# Patient Record
Sex: MALE | Race: WHITE | NOT HISPANIC OR LATINO | Employment: FULL TIME | URBAN - METROPOLITAN AREA
[De-identification: names, ages, dates, MRNs, and addresses within clinical notes are randomized per-mention and may not be internally consistent; named-entity substitution may affect disease eponyms.]

---

## 2017-01-04 ENCOUNTER — TRANSFERRED RECORDS (OUTPATIENT)
Dept: HEALTH INFORMATION MANAGEMENT | Facility: CLINIC | Age: 62
End: 2017-01-04

## 2017-03-01 ENCOUNTER — TRANSFERRED RECORDS (OUTPATIENT)
Dept: HEALTH INFORMATION MANAGEMENT | Facility: CLINIC | Age: 62
End: 2017-03-01

## 2017-03-09 ENCOUNTER — TELEPHONE (OUTPATIENT)
Dept: FAMILY MEDICINE | Facility: CLINIC | Age: 62
End: 2017-03-09

## 2017-03-09 NOTE — TELEPHONE ENCOUNTER
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 States Sunday he developed fever, cough, sore throat. Advised given timeframe tamiflu would not be an option. Discussed pushing fluids, rest and otc pain relievers prn. Advised to call back if not improving as expected. Patient/ parent verbalized understanding and agrees with plan.    Bella Pratt RN   [facility] - Triage

## 2017-03-15 ENCOUNTER — OFFICE VISIT (OUTPATIENT)
Dept: FAMILY MEDICINE | Facility: CLINIC | Age: 62
End: 2017-03-15
Payer: COMMERCIAL

## 2017-03-15 VITALS
DIASTOLIC BLOOD PRESSURE: 92 MMHG | WEIGHT: 221 LBS | TEMPERATURE: 97.9 F | BODY MASS INDEX: 29.29 KG/M2 | HEIGHT: 73 IN | SYSTOLIC BLOOD PRESSURE: 146 MMHG | OXYGEN SATURATION: 98 % | HEART RATE: 97 BPM

## 2017-03-15 DIAGNOSIS — R05.9 COUGH: Primary | ICD-10-CM

## 2017-03-15 DIAGNOSIS — J20.9 ACUTE BRONCHITIS WITH SYMPTOMS > 10 DAYS: ICD-10-CM

## 2017-03-15 LAB
FLUAV+FLUBV AG SPEC QL: NEGATIVE
FLUAV+FLUBV AG SPEC QL: NORMAL
SPECIMEN SOURCE: NORMAL

## 2017-03-15 PROCEDURE — 99213 OFFICE O/P EST LOW 20 MIN: CPT | Performed by: FAMILY MEDICINE

## 2017-03-15 PROCEDURE — 87804 INFLUENZA ASSAY W/OPTIC: CPT | Performed by: FAMILY MEDICINE

## 2017-03-15 RX ORDER — AZITHROMYCIN 250 MG/1
TABLET, FILM COATED ORAL
Qty: 6 TABLET | Refills: 0 | Status: SHIPPED | OUTPATIENT
Start: 2017-03-15 | End: 2017-08-07

## 2017-03-15 RX ORDER — FOLIC ACID 0.8 MG
500 TABLET ORAL EVERY MORNING
COMMUNITY
End: 2021-06-22

## 2017-03-15 RX ORDER — ALBUTEROL SULFATE 90 UG/1
2 AEROSOL, METERED RESPIRATORY (INHALATION) EVERY 6 HOURS PRN
Qty: 1 INHALER | Refills: 0 | Status: SHIPPED | OUTPATIENT
Start: 2017-03-15 | End: 2018-01-22

## 2017-03-15 NOTE — MR AVS SNAPSHOT
"              After Visit Summary   3/15/2017    Jeffrey Conteh    MRN: [id number]           Patient Information     Date Of Birth          1955        Visit Information        Provider Department      3/15/2017 12:00 PM Doyle Silva MD [facility] [place]        Today's Diagnoses     Cough    -  1    Acute bronchitis with symptoms > 10 days           Follow-ups after your visit        Who to contact     If you have questions or need follow up information about today's clinic visit or your schedule please contact [facility] [place] directly at [phone].  Normal or non-critical lab and imaging results will be communicated to you by MicroCHIPShart, letter or phone within 4 business days after the clinic has received the results. If you do not hear from us within 7 days, please contact the clinic through MicroCHIPShart or phone. If you have a critical or abnormal lab result, we will notify you by phone as soon as possible.  Submit refill requests through dev9k or call your pharmacy and they will forward the refill request to us. Please allow 3 business days for your refill to be completed.          Additional Information About Your Visit        MyChart Information     [email or web address] lets you send messages to your doctor, view your test results, renew your prescriptions, schedule appointments and more. To sign up, go to [email or web address] . Click on \"Log in\" on the left side of the screen, which will take you to the Welcome page. Then click on \"Sign up Now\" on the right side of the page.     You will be asked to enter the access code listed below, as well as some personal information. Please follow the directions to create your username and password.     Your access code is: [email or web address]  Expires: 2017 12:29 PM     Your access code will  in 90 days. If you need help or a new code, please call your [facility] or [phone].        Care EveryWhere ID     This is your Care EveryWhere ID. " "This could be used by other organizations to access your [place] medical records  [id number]        Your Vitals Were     Pulse Temperature Height Pulse Oximetry BMI (Body Mass Index)       97 97.9  F (36.6  C) (Tympanic) 6' 1\" (1.854 m) 98% 29.16 kg/m2        Blood Pressure from Last 3 Encounters:   03/15/17 (!) 146/92   06/14/16 138/74   09/25/15 120/72    Weight from Last 3 Encounters:   03/15/17 221 lb (100.2 kg)   06/14/16 221 lb (100.2 kg)   09/25/15 223 lb (101.2 kg)              We Performed the Following     Influenza A/B antigen          Today's Medication Changes          These changes are accurate as of: 3/15/17 12:29 PM.  If you have any questions, ask your nurse or doctor.               Start taking these medicines.        Dose/Directions    albuterol 108 (90 BASE) MCG/ACT Inhaler   Commonly known as:  PROAIR HFA/PROVENTIL HFA/VENTOLIN HFA   Used for:  Cough, Acute bronchitis with symptoms > 10 days   Started by:  Doyle Silva MD        Dose:  2 puff   Inhale 2 puffs into the lungs every 6 hours as needed for shortness of breath / dyspnea or wheezing   Quantity:  1 Inhaler   Refills:  0       azithromycin 250 MG tablet   Commonly known as:  ZITHROMAX   Used for:  Cough, Acute bronchitis with symptoms > 10 days   Started by:  Doyle Silva MD        Two tablets first day, then one tablet daily for four days.   Quantity:  6 tablet   Refills:  0         Stop taking these medicines if you haven't already. Please contact your care team if you have questions.     metroNIDAZOLE 1 % gel   Commonly known as:  METROGEL   Stopped by:  Doyle Silva MD                Where to get your medicines      These medications were sent to [id number] - [place], [place] - [address] AT [facility] [place] [facility] [address]  [address], [place] [address]    Hours:  24-hours Phone:  [phone]     albuterol 108 (90 BASE) MCG/ACT Inhaler    azithromycin 250 MG tablet                Primary Care " Provider Office Phone # Fax #    Doyle Silva [phone]       [facility] [place] [address] [place] [address] DR  [place] [address]        Thank you!     [facility] you [facility]  for your care. Our goal is always to provide you with excellent care. Hearing back from our patients is one way we can continue to improve our services. Please take a few minutes to complete the written survey that you may receive in the mail after your visit with us. Thank you!             Your Updated Medication List - Protect others around you: Learn how to safely use, store and throw away your medicines at www.disposemymeds.org.          This list is accurate as of: 3/15/17 12:29 PM.  Always use your most recent med list.                   Brand Name Dispense Instructions for use    albuterol 108 (90 BASE) MCG/ACT Inhaler    PROAIR HFA/PROVENTIL HFA/VENTOLIN HFA    1 Inhaler    Inhale 2 puffs into the lungs every 6 hours as needed for shortness of breath / dyspnea or wheezing       aspirin 81 MG tablet      Take 81 mg by mouth daily       azithromycin 250 MG tablet    ZITHROMAX    6 tablet    Two tablets first day, then one tablet daily for four days.       Magnesium 500 MG Caps          TUMS ULTRA 1000 1000 MG Chew   Generic drug:  calcium carbonate antacid      Take 1 chew tab by mouth 2 times daily       vitamin D 1000 UNITS capsule      Take 1 capsule by mouth daily

## 2017-03-15 NOTE — PROGRESS NOTES
SUBJECTIVE:                                                    Jeffrey Conteh is a 61 year old male who presents to clinic today for the following health issues:      Acute Illness   Acute illness concerns: URI  Onset: 12 days    Fever: no - low grade in the beginning    Chills/Sweats: no    Headache (location?): no    Sinus Pressure:no    Conjunctivitis:  Eyes feeling dry and red    Ear Pain: no    Rhinorrhea: YES    Congestion: YES - mostly in his chest    Sore Throat: YES- from coughing, tickling and drainage     Cough: YES-non-productive    Wheeze: YES    Decreased Appetite: no    Nausea: no    Vomiting: no    Diarrhea:  no    Dysuria/Freq.: no    Fatigue/Achiness: no    Sick/Strep Exposure: YES- Patient works at group home, 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      Therapies Tried and outcome: Allegra D, Mucinex, Aleve          Problem list and histories reviewed & adjusted, as indicated.  Additional history: as documented    Patient Active Problem List   Diagnosis     Sleep apnea     Diverticulitis of colon     Hyperlipidemia LDL goal <130     Neck arthritis (H)     Rosacea     Past Surgical History   Procedure Laterality Date     Appendectomy       2006 along with diverticulities      C total hip arthroplasty       2005     Septoplasty         Social History   Substance Use Topics     Smoking status: Never Smoker     Smokeless tobacco: Never Used     Alcohol use 0.0 oz/week     0 Standard drinks or equivalent per week     Family History   Problem Relation Age of Onset     Hypertension Father      Parkinsonism Father      Dementia Father      CANCER Father      Skin     Ovarian Cancer Mother      CANCER Maternal Grandmother      brain tumor      CANCER Maternal Uncle      brain tumor            Reviewed and updated as needed this visit by clinical staff       Reviewed and updated as needed this visit by Provider         ROS:  C: NEGATIVE for fever, chills, change in weight  E/M: NEGATIVE for ear,  "mouth and throat problems  RESP:POSITIVE for cough-non productive  CV: NEGATIVE for chest pain, palpitations or peripheral edema    OBJECTIVE:                                                    BP (!) 146/92 (BP Location: Right arm, Patient Position: Chair, Cuff Size: Adult Large)  Pulse 97  Temp 97.9  F (36.6  C) (Tympanic)  Ht 6' 1\" (1.854 m)  Wt 221 lb (100.2 kg)  SpO2 98%  BMI 29.16 kg/m2  Body mass index is 29.16 kg/(m^2).   GENERAL: healthy, alert, well nourished, well hydrated, no distress  HENT: ear canals- normal; TMs- normal; Nose- normal; Mouth- no ulcers, no lesions  NECK: no tenderness, no adenopathy, no asymmetry, no masses, no stiffness; thyroid- normal to palpation  RESP: lungs clear to auscultation - no rales, no rhonchi, mild wheezing noted.  CV: regular rates and rhythm, normal S1 S2, no S3 or S4 and no murmur, no click or rub -       ASSESSMENT/PLAN:                                                        ICD-10-CM    1. Cough R05 Influenza A/B antigen     azithromycin (ZITHROMAX) 250 MG tablet     albuterol (PROAIR HFA/PROVENTIL HFA/VENTOLIN HFA) 108 (90 BASE) MCG/ACT Inhaler   2. Acute bronchitis with symptoms > 10 days J20.9 azithromycin (ZITHROMAX) 250 MG tablet     albuterol (PROAIR HFA/PROVENTIL HFA/VENTOLIN HFA) 108 (90 BASE) MCG/ACT Inhaler       Patient requested to be checked for flu, will order and follow up on that.  He has symptoms for almost 2 weeks now mild wheezing with cough, will treat it as bronchitis.  Azithromycin along with the albuterol inhaler.    Doyle Silva MD  [facility]    "

## 2017-03-15 NOTE — NURSING NOTE
"Chief Complaint   Patient presents with     URI       Initial BP (!) 146/92 (BP Location: Right arm, Patient Position: Chair, Cuff Size: Adult Large)  Pulse 97  Temp 97.9  F (36.6  C) (Tympanic)  Ht 6' 1\" (1.854 m)  Wt 221 lb (100.2 kg)  SpO2 98%  BMI 29.16 kg/m2 Estimated body mass index is 29.16 kg/(m^2) as calculated from the following:    Height as of this encounter: 6' 1\" (1.854 m).    Weight as of this encounter: 221 lb (100.2 kg).  Medication Reconciliation: complete Guera Baker MA      "

## 2017-05-05 ENCOUNTER — THERAPY VISIT (OUTPATIENT)
Dept: PHYSICAL THERAPY | Facility: CLINIC | Age: 62
End: 2017-05-05
Payer: COMMERCIAL

## 2017-05-05 DIAGNOSIS — M76.61 RIGHT ACHILLES TENDINITIS: Primary | ICD-10-CM

## 2017-05-05 PROCEDURE — 97110 THERAPEUTIC EXERCISES: CPT | Mod: GP | Performed by: PHYSICAL THERAPIST

## 2017-05-05 PROCEDURE — 97161 PT EVAL LOW COMPLEX 20 MIN: CPT | Mod: GP | Performed by: PHYSICAL THERAPIST

## 2017-05-05 NOTE — LETTER
[facility] ATHLETIC [facility] PHYSICALTHERAPY  [address]  [address]  [facility] [address]  [phone]    May 5, 2017    Re: Jeffrey Conteh   :   1955  MRN:  [id number]   REFERRING PHYSICIAN:   Diogo Watson    [facility]    Date of Initial Evaluation:  17  Visits:  Rxs Used: 1  Reason for Referral:  Right Achilles tendinitis    EVALUATION SUMMARY    Subjective:  Patient is a 61 year old male presenting with rehab right ankle/foot hpi. The history is provided by the patient. No  was used. Jeffrey Conteh is a 61 year old male with a right ankle condition. Condition occurred with:  Insidious onset.  Condition occurred: for unknown reasons. This is a new condition  2016.    Patient reports pain:  Posterior and lower leg (Right Achillies).  Radiates to:  Lower leg.  Pain is described as aching and is intermittent (depends on activitiy level) and reported as 2/10 (7-9/10 with activity). Pain is the same all the time.  Symptoms are exacerbated by walking and running and relieved by rest, ice and other (..25 inch lift).  Since onset symptoms are unchanged.  Special tests:  X-ray. General health as reported by patient is good.  Pertinent medical history includes: Osteoarthritis, history of fractures and sleep disorder/apnea. Medical allergies: no. Other surgeries include:  Orthopedic surgery.  Current medications:  None as reported by patient.  Current occupation is . Primary job tasks include:  Prolonged standing.    Barriers include:  None as reported by patient.  Red flags:  None as reported by patient.    Objective:  System  Ankle/Foot Evaluation  ROM:    AROM:    Dorsiflexion: Left:    Right:   Wnl, stretch   Plantarflexion:  Left:  Wnl    Right:  Wnl  PROM:    Dorsiflexion: Left:        Right:   Wnl, stretch   Plantarflexion: Left:        Right:   Wnl  Strength:    Dorsiflexion:  Left: 4+/5   Strong/pain free    Pain:   Right: 4+/5   Strong/pain free  Pain:  Plantarflexion: Left: 5/5   Pain:   Right: 4+/5  Strong/painful  Pain:  Re: Jeffrey Conteh   :   1955    LIGAMENT TESTING: not assessed  SPECIAL TESTS: not assessed  PALPATION:   Right ankle tenderness present at:   achilles tendon  EDEMA: normal  General   ROS    Assessment/Plan:    Patient is a 61 year old male with right side ankle complaints.    Patient has the following significant findings with corresponding treatment plan.                Diagnosis 1:  Right Achilles tendinosis non-insertional  Pain -  hot/cold therapy, US and manual therapy  Decreased ROM/flexibility - manual therapy and therapeutic exercise  Decreased strength - therapeutic exercise and therapeutic activities    Therapy Evaluation Codes:   1) History comprised of:   Personal factors that impact the plan of care:      Age and None.    Comorbidity factors that impact the plan of care are:      None.     Medications impacting care: None.  2) Examination of Body Systems comprised of:   Body structures and functions that impact the plan of care:      Ankle.   Activity limitations that impact the plan of care are:      Walking.  3) Clinical presentation characteristics are:   Stable/Uncomplicated.  4) Decision-Making    Low complexity using standardized patient assessment instrument and/or measureable assessment of functional outcome.  Cumulative Therapy Evaluation is: Low complexity.    Communication ability:  Patient appears to be able to clearly communicate and understand verbal and written communication and follow directions correctly.  Treatment Explanation - The following has been discussed with the patient:   RX ordered/plan of care  Anticipated outcomes  Possible risks and side effects              Re: Jeffrey CHAKRABORTY Conteh   :   1955    This patient would benefit from PT intervention to resume normal activities.    Rehab potential is excellent.  Frequency:  1 X week, once daily  Duration:  for 12 weeks  Discharge Plan:  Achieve all LTG.  Independent in home treatment program.  Reach maximal therapeutic benefit.    Thank you for your referral.    INQUIRIES  Therapist: Joyce Wu PT   [facility] [place] [facility]  [address] [place] [address]  [address]  [place] [address]  Phone: [phone]  Fax: [phone]

## 2017-05-05 NOTE — PROGRESS NOTES
Subjective:    Patient is a 61 year old male presenting with rehab right ankle/foot hpi. The history is provided by the patient. No  was used.   Jeffrey Conteh is a 61 year old male with a right ankle condition.  Condition occurred with:  Insidious onset.  Condition occurred: for unknown reasons.  This is a new condition  November 2016.    Patient reports pain:  Posterior and lower leg (Right Achillies).  Radiates to:  Lower leg.  Pain is described as aching and is intermittent (depends on activitiy level) and reported as 2/10 (7-9/10 with activity).   Pain is the same all the time.  Symptoms are exacerbated by walking and running and relieved by rest, ice and other (..25 inch lift).  Since onset symptoms are unchanged.  Special tests:  X-ray.      General health as reported by patient is good.  Pertinent medical history includes:  Osteoarthritis, history of fractures and sleep disorder/apnea.  Medical allergies: no.  Other surgeries include:  Orthopedic surgery.  Current medications:  None as reported by patient.  Current occupation is .    Primary job tasks include:  Prolonged standing.    Barriers include:  None as reported by patient.    Red flags:  None as reported by patient.                        Objective:    System    Ankle/Foot Evaluation  ROM:    AROM:    Dorsiflexion: Left:    Right:   Wnl, stretch   Plantarflexion:  Left:  Wnl    Right:  Wnl          PROM:    Dorsiflexion: Left:        Right:   Wnl, stretch   Plantarflexion: Left:        Right:  Wnl              Strength:    Dorsiflexion:  Left: 4+/5   Strong/pain free    Pain:   Right: 4+/5   Strong/pain free  Pain:  Plantarflexion: Left: 5/5   Pain:   Right: 4+/5  Strong/painful  Pain:                      LIGAMENT TESTING: not assessed              SPECIAL TESTS: not assessed    PALPATION:     Right ankle tenderness present at:   achilles tendon  EDEMA: normal                                                               General     ROS    Assessment/Plan:      Patient is a 61 year old male with right side ankle complaints.    Patient has the following significant findings with corresponding treatment plan.                Diagnosis 1:  Right Achilles tendinosis non-insertional  Pain -  hot/cold therapy, US and manual therapy  Decreased ROM/flexibility - manual therapy and therapeutic exercise  Decreased strength - therapeutic exercise and therapeutic activities    Therapy Evaluation Codes:   1) History comprised of:   Personal factors that impact the plan of care:      Age and None.    Comorbidity factors that impact the plan of care are:      None.     Medications impacting care: None.  2) Examination of Body Systems comprised of:   Body structures and functions that impact the plan of care:      Ankle.   Activity limitations that impact the plan of care are:      Walking.  3) Clinical presentation characteristics are:   Stable/Uncomplicated.  4) Decision-Making    Low complexity using standardized patient assessment instrument and/or measureable assessment of functional outcome.  Cumulative Therapy Evaluation is: Low complexity.    Previous and current functional limitations:  (See Goal Flow Sheet for this information)    Short term and Long term goals: (See Goal Flow Sheet for this information)     Communication ability:  Patient appears to be able to clearly communicate and understand verbal and written communication and follow directions correctly.  Treatment Explanation - The following has been discussed with the patient:   RX ordered/plan of care  Anticipated outcomes  Possible risks and side effects  This patient would benefit from PT intervention to resume normal activities.   Rehab potential is excellent.    Frequency:  1 X week, once daily  Duration:  for 12 weeks  Discharge Plan:  Achieve all LTG.  Independent in home treatment program.  Reach maximal therapeutic benefit.    Please refer to the daily  flowsheet for treatment today, total treatment time and time spent performing 1:1 timed codes.

## 2017-05-05 NOTE — MR AVS SNAPSHOT
After Visit Summary   5/5/2017    Jeffrey Conteh    MRN: [id number]           Patient Information     Date Of Birth          1955        Visit Information        Provider Department      5/5/2017 10:10 AM Joyce Wu, PT [facility] [place] [facility]        Today's Diagnoses     Right Achilles tendinitis    -  1       Follow-ups after your visit        Your next 10 appointments already scheduled     May 12, 2017  2:00 PM CDT   ALEJANDRO Extremity with Ruben Elam, PT   [facility] [place] [facility] ([facility] [place])    [address] [place] [address]  [address]  [place] [address]   [phone]            May 19, 2017  2:00 PM CDT   ALEJANDRO Extremity with [facility]en Elam [facility] [place] [facility] ([facility] [place])    [address] [place] [address]  [address]  [place] [address]   [phone]            May 30, 2017  9:40 AM CDT   ALEJANDRO Extremity with Ruben Elam [facility] [place] [facility] ([facility] [place])    [address] [place] [address]  [address]  [place] [address]   [phone]              Who to contact     If you have questions or need follow up information about today's clinic visit or your schedule please contact [facility] directly at [phone].  Normal or non-critical lab and imaging results will be communicated to you by Stealth10hart, letter or phone within 4 business days after the clinic has received the results. If you do not hear from us within 7 days, please contact the clinic through Stealth10hart or phone. If you have a critical or abnormal lab result, we will notify you by phone as soon as possible.  Submit refill requests through yetu or call your pharmacy and they will forward the refill request to us. Please allow 3 business days for your refill to be completed.          Additional Information About  "Your Visit        MyChart Information     Cloak lets you send messages to your doctor, view your test results, renew your prescriptions, schedule appointments and more. To sign up, go to [email or web address] . Click on \"Log in\" on the left side of the screen, which will take you to the Welcome page. Then click on \"Sign up Now\" on the right side of the page.     You will be asked to enter the access code listed below, as well as some personal information. Please follow the directions to create your username and password.     Your access code is: [email or web address]  Expires: 2017 12:29 PM     Your access code will  in 90 days. If you need help or a new code, please call your [place] clinic or [phone].        Care EveryWhere ID     This is your Care EveryWhere ID. This could be used by other organizations to access your [place] medical records  [id number]         Blood Pressure from Last 3 Encounters:   03/15/17 (!) 146/92   16 138/74   09/25/15 120/72    Weight from Last 3 Encounters:   03/15/17 100.2 kg (221 lb)   16 100.2 kg (221 lb)   09/25/15 101.2 kg (223 lb)              We Performed the Following     PT Eval, Low Complexity (99865)     Therapeutic Exercises        Primary Care Provider Office Phone # Fax #    Doyle Silva [phone]       [facility] [place] [address] [place] [address] DR  [place] [address]        Thank you!     Thank you for choosing [facility]  [facility] your [facility]. Our goal is always to provide you with excellent care. Hearing back from our patients is one way we can continue to improve our services. Please take a few minutes to complete the written survey that you may receive in the mail after your visit with us. Thank you!             Your Updated Medication List - Protect others around you: Learn how to safely use, store and throw away your medicines at www.disposemymeds.org.          This " list is accurate as of: 5/5/17  3:38 PM.  Always use your most recent med list.                   Brand Name Dispense Instructions for use    albuterol 108 (90 BASE) MCG/ACT Inhaler    PROAIR HFA/PROVENTIL HFA/VENTOLIN HFA    1 Inhaler    Inhale 2 puffs into the lungs every 6 hours as needed for shortness of breath / dyspnea or wheezing       aspirin 81 MG tablet      Take 81 mg by mouth daily       azithromycin 250 MG tablet    ZITHROMAX    6 tablet    Two tablets first day, then one tablet daily for four days.       Magnesium 500 MG Caps          TUMS ULTRA 1000 1000 MG Chew   Generic drug:  calcium carbonate antacid      Take 1 chew tab by mouth 2 times daily       vitamin D 1000 UNITS capsule      Take 1 capsule by mouth daily

## 2017-05-12 ENCOUNTER — THERAPY VISIT (OUTPATIENT)
Dept: PHYSICAL THERAPY | Facility: CLINIC | Age: 62
End: 2017-05-12
Payer: COMMERCIAL

## 2017-05-12 DIAGNOSIS — M76.61 RIGHT ACHILLES TENDINITIS: ICD-10-CM

## 2017-05-12 PROCEDURE — 97140 MANUAL THERAPY 1/> REGIONS: CPT | Mod: GP | Performed by: PHYSICAL THERAPIST

## 2017-05-12 PROCEDURE — 97110 THERAPEUTIC EXERCISES: CPT | Mod: GP | Performed by: PHYSICAL THERAPIST

## 2017-05-12 PROCEDURE — 97035 APP MDLTY 1+ULTRASOUND EA 15: CPT | Mod: GP | Performed by: PHYSICAL THERAPIST

## 2017-05-16 ENCOUNTER — THERAPY VISIT (OUTPATIENT)
Dept: PHYSICAL THERAPY | Facility: CLINIC | Age: 62
End: 2017-05-16
Payer: COMMERCIAL

## 2017-05-16 DIAGNOSIS — M76.61 RIGHT ACHILLES TENDINITIS: ICD-10-CM

## 2017-05-16 PROCEDURE — 97110 THERAPEUTIC EXERCISES: CPT | Mod: GP | Performed by: PHYSICAL THERAPIST

## 2017-05-16 PROCEDURE — 97140 MANUAL THERAPY 1/> REGIONS: CPT | Mod: GP | Performed by: PHYSICAL THERAPIST

## 2017-05-16 PROCEDURE — 97035 APP MDLTY 1+ULTRASOUND EA 15: CPT | Mod: GP | Performed by: PHYSICAL THERAPIST

## 2017-05-19 ENCOUNTER — THERAPY VISIT (OUTPATIENT)
Dept: PHYSICAL THERAPY | Facility: CLINIC | Age: 62
End: 2017-05-19
Payer: COMMERCIAL

## 2017-05-19 DIAGNOSIS — M76.61 RIGHT ACHILLES TENDINITIS: ICD-10-CM

## 2017-05-19 PROCEDURE — 97110 THERAPEUTIC EXERCISES: CPT | Mod: GP | Performed by: PHYSICAL THERAPIST

## 2017-05-19 PROCEDURE — 97140 MANUAL THERAPY 1/> REGIONS: CPT | Mod: GP | Performed by: PHYSICAL THERAPIST

## 2017-05-19 PROCEDURE — 97010 HOT OR COLD PACKS THERAPY: CPT | Mod: GP | Performed by: PHYSICAL THERAPIST

## 2017-05-19 PROCEDURE — 0101T ESW MUSCSKEL SYS NOS: CPT | Mod: GP | Performed by: PHYSICAL THERAPIST

## 2017-05-19 PROCEDURE — 97014 ELECTRIC STIMULATION THERAPY: CPT | Mod: GP | Performed by: PHYSICAL THERAPIST

## 2017-05-23 ENCOUNTER — THERAPY VISIT (OUTPATIENT)
Dept: PHYSICAL THERAPY | Facility: CLINIC | Age: 62
End: 2017-05-23
Payer: COMMERCIAL

## 2017-05-23 DIAGNOSIS — M76.61 RIGHT ACHILLES TENDINITIS: ICD-10-CM

## 2017-05-23 PROCEDURE — 97140 MANUAL THERAPY 1/> REGIONS: CPT | Mod: GP | Performed by: PHYSICAL THERAPIST

## 2017-05-23 PROCEDURE — 97010 HOT OR COLD PACKS THERAPY: CPT | Mod: GP | Performed by: PHYSICAL THERAPIST

## 2017-05-23 PROCEDURE — 0101T ESW MUSCSKEL SYS NOS: CPT | Mod: GP | Performed by: PHYSICAL THERAPIST

## 2017-05-23 PROCEDURE — 97014 ELECTRIC STIMULATION THERAPY: CPT | Mod: GP | Performed by: PHYSICAL THERAPIST

## 2017-05-23 PROCEDURE — 97110 THERAPEUTIC EXERCISES: CPT | Mod: GP | Performed by: PHYSICAL THERAPIST

## 2017-05-30 ENCOUNTER — THERAPY VISIT (OUTPATIENT)
Dept: PHYSICAL THERAPY | Facility: CLINIC | Age: 62
End: 2017-05-30
Payer: COMMERCIAL

## 2017-05-30 DIAGNOSIS — M76.61 RIGHT ACHILLES TENDINITIS: ICD-10-CM

## 2017-05-30 PROCEDURE — 97010 HOT OR COLD PACKS THERAPY: CPT | Mod: GP | Performed by: PHYSICAL THERAPIST

## 2017-05-30 PROCEDURE — 97110 THERAPEUTIC EXERCISES: CPT | Mod: GP | Performed by: PHYSICAL THERAPIST

## 2017-05-30 PROCEDURE — 0101T ESW MUSCSKEL SYS NOS: CPT | Mod: GP | Performed by: PHYSICAL THERAPIST

## 2017-05-30 PROCEDURE — 97112 NEUROMUSCULAR REEDUCATION: CPT | Mod: GP | Performed by: PHYSICAL THERAPIST

## 2017-05-30 PROCEDURE — 97014 ELECTRIC STIMULATION THERAPY: CPT | Mod: GP | Performed by: PHYSICAL THERAPIST

## 2017-06-02 ENCOUNTER — THERAPY VISIT (OUTPATIENT)
Dept: PHYSICAL THERAPY | Facility: CLINIC | Age: 62
End: 2017-06-02
Payer: COMMERCIAL

## 2017-06-02 DIAGNOSIS — M76.61 RIGHT ACHILLES TENDINITIS: ICD-10-CM

## 2017-06-02 PROCEDURE — 97110 THERAPEUTIC EXERCISES: CPT | Mod: GP | Performed by: PHYSICAL THERAPIST

## 2017-06-02 PROCEDURE — 97140 MANUAL THERAPY 1/> REGIONS: CPT | Mod: GP | Performed by: PHYSICAL THERAPIST

## 2017-06-02 PROCEDURE — 97112 NEUROMUSCULAR REEDUCATION: CPT | Mod: GP | Performed by: PHYSICAL THERAPIST

## 2017-06-02 PROCEDURE — 0101T ESW MUSCSKEL SYS NOS: CPT | Mod: GP | Performed by: PHYSICAL THERAPIST

## 2017-06-07 ENCOUNTER — THERAPY VISIT (OUTPATIENT)
Dept: PHYSICAL THERAPY | Facility: CLINIC | Age: 62
End: 2017-06-07
Payer: COMMERCIAL

## 2017-06-07 DIAGNOSIS — M76.61 RIGHT ACHILLES TENDINITIS: ICD-10-CM

## 2017-06-07 PROCEDURE — 97110 THERAPEUTIC EXERCISES: CPT | Mod: GP | Performed by: PHYSICAL THERAPIST

## 2017-06-07 PROCEDURE — 97035 APP MDLTY 1+ULTRASOUND EA 15: CPT | Mod: GP | Performed by: PHYSICAL THERAPIST

## 2017-06-07 PROCEDURE — 97112 NEUROMUSCULAR REEDUCATION: CPT | Mod: GP | Performed by: PHYSICAL THERAPIST

## 2017-06-21 ENCOUNTER — THERAPY VISIT (OUTPATIENT)
Dept: PHYSICAL THERAPY | Facility: CLINIC | Age: 62
End: 2017-06-21
Payer: COMMERCIAL

## 2017-06-21 DIAGNOSIS — M76.61 RIGHT ACHILLES TENDINITIS: ICD-10-CM

## 2017-06-21 PROCEDURE — 97112 NEUROMUSCULAR REEDUCATION: CPT | Mod: GP | Performed by: PHYSICAL THERAPIST

## 2017-06-21 PROCEDURE — 97140 MANUAL THERAPY 1/> REGIONS: CPT | Mod: GP | Performed by: PHYSICAL THERAPIST

## 2017-06-21 PROCEDURE — 97110 THERAPEUTIC EXERCISES: CPT | Mod: GP | Performed by: PHYSICAL THERAPIST

## 2017-06-21 PROCEDURE — 97035 APP MDLTY 1+ULTRASOUND EA 15: CPT | Mod: GP | Performed by: PHYSICAL THERAPIST

## 2017-08-07 ENCOUNTER — OFFICE VISIT (OUTPATIENT)
Dept: FAMILY MEDICINE | Facility: CLINIC | Age: 62
End: 2017-08-07
Payer: COMMERCIAL

## 2017-08-07 VITALS
OXYGEN SATURATION: 98 % | SYSTOLIC BLOOD PRESSURE: 126 MMHG | WEIGHT: 221.8 LBS | TEMPERATURE: 97.2 F | HEART RATE: 73 BPM | HEIGHT: 73 IN | DIASTOLIC BLOOD PRESSURE: 84 MMHG | BODY MASS INDEX: 29.4 KG/M2

## 2017-08-07 DIAGNOSIS — M47.812 NECK ARTHRITIS: ICD-10-CM

## 2017-08-07 DIAGNOSIS — R21 RASH: Primary | ICD-10-CM

## 2017-08-07 PROCEDURE — 99213 OFFICE O/P EST LOW 20 MIN: CPT | Performed by: INTERNAL MEDICINE

## 2017-08-07 RX ORDER — TRIAMCINOLONE ACETONIDE 1 MG/G
OINTMENT TOPICAL
Qty: 15 G | Refills: 1 | Status: SHIPPED | OUTPATIENT
Start: 2017-08-07 | End: 2019-09-07

## 2017-08-07 NOTE — PROGRESS NOTES
"  SUBJECTIVE:                                                    Jeffrey Conteh is a 62 year old male who presents to clinic today for the following health issues:      Rash  Onset: Friday    Description:   Location: right side of top of hair line  Character: raised, red  Itching (Pruritis): no     Progression of Symptoms:  same    Accompanying Signs & Symptoms:  Fever: no   Body aches or joint pain: no   Sore throat symptoms: no   Recent cold symptoms: no     History:   Previous similar rash: no     Precipitating factors:   Exposure to similar rash: no   New exposures: None   Recent travel: no     Alleviating factors:  N/a     Therapies Tried and outcome: none    Jeffrey is here with a rash on his forehead. Started 3 days ago. He did have a haircut that morning. There is a patch of red skin on his right forehead - it is not bothersome really.  He has not used any creams on it.         Reviewed and updated as needed this visit by clinical staffTobacco  Allergies  Meds         ROS:  Const, msk, skin reviewed, otherwise negative unless noted above.     OBJECTIVE:     /84  Pulse 73  Temp 97.2  F (36.2  C) (Tympanic)  Ht 6' 1\" (1.854 m)  Wt 221 lb 12.8 oz (100.6 kg)  SpO2 98%  BMI 29.26 kg/m2  Body mass index is 29.26 kg/(m^2).    Gen: pleasant, well appearing man, no distress  Skin: ~2x3 cm oval patch at right hair line - scaling and erythematous. Non tender, no vesicles.       ASSESSMENT/PLAN:       1. Rash  Possibly related to exposure at the [place]. unlikely shingles. Recommended steroid cream. If not improving may need to see a dermatologist.   - triamcinolone (KENALOG) 0.1 % ointment; Apply sparingly to affected area three times daily for 14 days.  Dispense: 15 g; Refill: 1    2. Neck arthritis (H)  Mainly bothered by decreased ROM than by pain.        F/U as needed for persistent or worsening symptoms.       Mera Blair MD  [facility] [place]  "

## 2017-08-07 NOTE — NURSING NOTE
"Chief Complaint   Patient presents with     Derm Problem     rash at hair line & pea sized bump by right ear       Initial /84  Pulse 73  Temp 97.2  F (36.2  C) (Tympanic)  Ht 6' 1\" (1.854 m)  Wt 221 lb 12.8 oz (100.6 kg)  SpO2 98%  BMI 29.26 kg/m2 Estimated body mass index is 29.26 kg/(m^2) as calculated from the following:    Height as of this encounter: 6' 1\" (1.854 m).    Weight as of this encounter: 221 lb 12.8 oz (100.6 kg).  Medication Reconciliation: complete  "

## 2017-08-07 NOTE — MR AVS SNAPSHOT
"              After Visit Summary   2017    Jeffrey Conteh    MRN: [id number]           Patient Information     Date Of Birth          1955        Visit Information        Provider Department      2017 10:00 AM Mera Blair MD [facility] [place]        Today's Diagnoses     Rash    -  1       Follow-ups after your visit        Who to contact     If you have questions or need follow up information about today's clinic visit or your schedule please contact [facility] [place] directly at [phone].  Normal or non-critical lab and imaging results will be communicated to you by Light Blue Opticshart, letter or phone within 4 business days after the clinic has received the results. If you do not hear from us within 7 days, please contact the clinic through Light Blue Opticshart or phone. If you have a critical or abnormal lab result, we will notify you by phone as soon as possible.  Submit refill requests through Trada or call your pharmacy and they will forward the refill request to us. Please allow 3 business days for your refill to be completed.          Additional Information About Your Visit        MyChart Information     Trada lets you send messages to your doctor, view your test results, renew your prescriptions, schedule appointments and more. To sign up, go to [email or web address] . Click on \"Log in\" on the left side of the screen, which will take you to the Welcome page. Then click on \"Sign up Now\" on the right side of the page.     You will be asked to enter the access code listed below, as well as some personal information. Please follow the directions to create your username and password.     Your access code is: [email or web address]  Expires: 2017  1:11 PM     Your access code will  in 90 days. If you need help or a new code, please call your [facility] or [phone].        Care EveryWhere ID     This is your Care EveryWhere ID. This could be used by other organizations " "to access your [place] medical records  [id number]        Your Vitals Were     Pulse Temperature Height Pulse Oximetry BMI (Body Mass Index)       73 97.2  F (36.2  C) (Tympanic) 6' 1\" (1.854 m) 98% 29.26 kg/m2        Blood Pressure from Last 3 Encounters:   08/07/17 126/84   03/15/17 (!) 146/92   06/14/16 138/74    Weight from Last 3 Encounters:   08/07/17 221 lb 12.8 oz (100.6 kg)   03/15/17 221 lb (100.2 kg)   06/14/16 221 lb (100.2 kg)              Today, you had the following     No orders found for display         Today's Medication Changes          These changes are accurate as of: 8/7/17 10:25 AM.  If you have any questions, ask your nurse or doctor.               Start taking these medicines.        Dose/Directions    triamcinolone 0.1 % ointment   Commonly known as:  KENALOG   Used for:  Rash   Started by:  Mera Blair MD        Apply sparingly to affected area three times daily for 14 days.   Quantity:  15 g   Refills:  1            Where to get your medicines      These medications were sent to [id number] - [place], [place] - [address] AT [facility] [place] [facility] [address]  [address], [place] [address]    Hours:  24-hours Phone:  [phone]     triamcinolone 0.1 % ointment                Primary Care Provider Office Phone # Fax #    Doyle Silva [phone]       [facility] [place] [address] [place] [address] [address]  [place] [address]        Equal Access to Services     REINIER WALLER AH: Hadii yong mckay Sohayley, waaxda luqadaha, qaybta kaalmada adeegyada, walt delacruz. So [facility] [phone].    ATENCIÓN: Si habla español, tiene a mendez disposición servicios gratuitos de asistencia lingüística. Llame al [phone].    We comply with applicable federal civil rights laws and [place] laws. We do not discriminate on the basis of race, color, national origin, age, disability sex, sexual orientation or gender " identity.            Thank you!     Thank you for choosing [facility] [place]  for your care. Our goal is always to provide you with excellent care. Hearing back from our patients is one way we can continue to improve our services. Please take a few minutes to complete the written survey that you may receive in the mail after your visit with us. Thank you!             Your Updated Medication List - Protect others around you: Learn how to safely use, store and throw away your medicines at www.disposemymeds.org.          This list is accurate as of: 8/7/17 10:25 AM.  Always use your most recent med list.                   Brand Name Dispense Instructions for use Diagnosis    albuterol 108 (90 BASE) MCG/ACT Inhaler    PROAIR HFA/PROVENTIL HFA/VENTOLIN HFA    1 Inhaler    Inhale 2 puffs into the lungs every 6 hours as needed for shortness of breath / dyspnea or wheezing    Cough, Acute bronchitis with symptoms > 10 days       ALEVE PO           aspirin 81 MG tablet      Take 81 mg by mouth daily        Magnesium 500 MG Caps           triamcinolone 0.1 % ointment    KENALOG    15 g    Apply sparingly to affected area three times daily for 14 days.    Rash       TUMS ULTRA 1000 1000 MG Chew   Generic drug:  calcium carbonate antacid      Take 1 chew tab by mouth 2 times daily        vitamin D 1000 UNITS capsule      Take 1 capsule by mouth daily

## 2017-09-12 ENCOUNTER — TRANSFERRED RECORDS (OUTPATIENT)
Dept: HEALTH INFORMATION MANAGEMENT | Facility: CLINIC | Age: 62
End: 2017-09-12

## 2017-09-20 ENCOUNTER — TRANSFERRED RECORDS (OUTPATIENT)
Dept: HEALTH INFORMATION MANAGEMENT | Facility: CLINIC | Age: 62
End: 2017-09-20

## 2017-09-26 ENCOUNTER — THERAPY VISIT (OUTPATIENT)
Dept: PHYSICAL THERAPY | Facility: CLINIC | Age: 62
End: 2017-09-26
Payer: COMMERCIAL

## 2017-09-26 DIAGNOSIS — M67.88 RIGHT PERONEAL TENDINOSIS: Primary | ICD-10-CM

## 2017-09-26 PROCEDURE — 97110 THERAPEUTIC EXERCISES: CPT | Mod: GP | Performed by: PHYSICAL THERAPIST

## 2017-09-26 PROCEDURE — 97035 APP MDLTY 1+ULTRASOUND EA 15: CPT | Mod: GP | Performed by: PHYSICAL THERAPIST

## 2017-09-26 PROCEDURE — 97161 PT EVAL LOW COMPLEX 20 MIN: CPT | Mod: GP | Performed by: PHYSICAL THERAPIST

## 2017-09-26 NOTE — MR AVS SNAPSHOT
"              After Visit Summary   9/26/2017    Jeffrey Conteh    MRN: [id number]           Patient Information     Date Of Birth          1955        Visit Information        Provider Department      9/26/2017 7:40 AM Ruben Elam PT [facility] [place] [place] [facility]        Today's Diagnoses     Right peroneal tendinosis    -  1       Follow-ups after your visit        Your next 10 appointments already scheduled     Oct 04, 2017 10:40 AM CDT   ALEJANDRO Extremity with Ruben Elam PT   [facility] ([place]DR[place])    [address] [place] [address]  [address]  [place] [id number] [phone]              Who to contact     If you have questions or need follow up information about today's clinic visit or your schedule please contact [facility] directly at [phone].  Normal or non-critical lab and imaging results will be communicated to you by InExchangehart, letter or phone within 4 business days after the clinic has received the results. If you do not hear from us within 7 days, please contact the clinic through InExchangehart or phone. If you have a critical or abnormal lab result, we will notify you by phone as soon as possible.  Submit refill requests through RainKing or call your pharmacy and they will forward the refill request to us. Please allow 3 business days for your refill to be completed.          Additional Information About Your Visit        InExchangehart Information     RainKing lets you send messages to your doctor, view your test results, renew your prescriptions, schedule appointments and more. To sign up, go to [email or web address] . Click on \"Log in\" on the left side of the screen, which will take you to the Welcome page. Then click on \"Sign up Now\" on the right side of the page.     You will be asked to enter the access code listed below, as well as some personal information. " Please follow the directions to create your username and password.     Your access code is: [email or web address]  Expires: 2017  8:39 AM     Your access code will  in 90 days. If you need help or a new code, please call your [place] clinic or [phone].        Care EveryWhere ID     This is your Care EveryWhere ID. This could be used by other organizations to access your [place] medical records  [id number]         Blood Pressure from Last 3 Encounters:   17 126/84   03/15/17 (!) 146/92   16 138/74    Weight from Last 3 Encounters:   17 100.6 kg (221 lb 12.8 oz)   03/15/17 100.2 kg (221 lb)   16 100.2 kg (221 lb)              We Performed the Following     HC PT EVAL, LOW COMPLEXITY     ALEJANDRO INITIAL EVAL REPORT     THERAPEUTIC EXERCISES     ULTRASOUND THERAPY        Primary Care Provider Office Phone # Fax #    Doyle Silva [phone]        [facility] [address]  [place] [address]        Equal Access to Services     [facility]: Hadii aad ku hadasho Soomaali, waaxda luqadaha, qaybta kaalmada adegracyyada, walt stein . So [facility] [phone].    ATENCIÓN: Si habla español, tiene a mendez disposición servicios gratuitos de asistencia lingüística. Ll[facility] [phone].    We comply with applicable federal civil rights laws and [place] laws. We do not discriminate on the basis of race, color, national origin, age, disability sex, sexual orientation or gender identity.            Thank you!     Thank you for choosing [facility]  [place] [facility]  for your care. Our goal is always to provide you with excellent care. Hearing back from our patients is one way we can continue to improve our services. Please take a few minutes to complete the written survey that you may receive in the mail after your visit with us. Thank you!             Your Updated Medication List - Protect others around you: Learn how to safely  use, store and throw away your medicines at www.disposemymeds.org.          This list is accurate as of: 9/26/17  8:39 AM.  Always use your most recent med list.                   Brand Name Dispense Instructions for use Diagnosis    albuterol 108 (90 BASE) MCG/ACT Inhaler    PROAIR HFA/PROVENTIL HFA/VENTOLIN HFA    1 Inhaler    Inhale 2 puffs into the lungs every 6 hours as needed for shortness of breath / dyspnea or wheezing    Cough, Acute bronchitis with symptoms > 10 days       ALEVE PO           aspirin 81 MG tablet      Take 81 mg by mouth daily        Magnesium 500 MG Caps           triamcinolone 0.1 % ointment    KENALOG    15 g    Apply sparingly to affected area three times daily for 14 days.    Rash       TUMS ULTRA 1000 1000 MG Chew   Generic drug:  calcium carbonate antacid      Take 1 chew tab by mouth 2 times daily        vitamin D 1000 UNITS capsule      Take 1 capsule by mouth daily

## 2017-09-26 NOTE — PROGRESS NOTES
Subjective:    Patient is a 62 year old male presenting with rehab right ankle/foot hpi.   Jeffrey Conteh is a 62 year old male with a right ankle and right foot condition.  Condition occurred with:  Repetition/overuse.  Condition occurred: in the community.  This is a new condition  Patient is referred with a 3 week hx of R lateral foot and ankle pain. He thinks it may be due to hiking on uneven terrain. 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    Patient reports pain:  Lateral.  Radiates to:  Ankle.  Pain is described as sharp and aching and is intermittent and reported as 3/10.  Associated symptoms:  Loss of strength. Pain is worse during the day.  Symptoms are exacerbated by running and walking and relieved by rest.  Since onset symptoms are gradually improving.        General health as reported by patient is excellent.  Pertinent medical history includes:  Osteoarthritis.    Other surgeries include:  Orthopedic surgery (L STELLA, L Achilles repair).  Current medications:  Anti-inflammatory.    Patient is working in normal job without restrictions.  Primary job tasks include:  Prolonged standing.    Barriers include:  None as reported by patient.    Red flags:  None as reported by patient.                        Objective:    System    Ankle/Foot Evaluation  ROM:  AROM is normal.PROM is normal.      Strength:    Dorsiflexion:  Left: 5/5     Pain:   Right: 5/5   Pain:  Plantarflexion: Left: 5/5   Pain:   Right: 4+/5   Pain:++  Inversion:Left: 5/5  Pain:     Right: 5/5  Pain:  Eversion:Left: 5/5  Pain:  Right: 5/5   Pain:++                  LIGAMENT TESTING: not assessed              SPECIAL TESTS: not assessed    PALPATION: Palpation of ankle: right 5th metatarsal.    EDEMA:     Right ankle edema present at:  lateral        MOBILITY TESTING: normal              FUNCTIONAL TESTS:         Quad:      Bilateral Leg Squat:  Control is moderate loss of control and  excessive anterior knee excursion                                                        General     ROS    Assessment/Plan:      Patient is a 62 year old male with right side ankle complaints.    Patient has the following significant findings with corresponding treatment plan.                Diagnosis 1:  R peroneal tendinopathy  Pain -  US, manual therapy, splint/taping/bracing/orthotics, self management, education and home program  Decreased strength - therapeutic exercise, therapeutic activities and home program  Impaired gait - home program  Impaired muscle performance - neuro re-education and home program  Decreased function - therapeutic activities and home program    Therapy Evaluation Codes:   1) History comprised of:   Personal factors that impact the plan of care:      None.    Comorbidity factors that impact the plan of care are:      None.     Medications impacting care: None.  2) Examination of Body Systems comprised of:   Body structures and functions that impact the plan of care:      Ankle.   Activity limitations that impact the plan of care are:      Jumping, Running and Walking.  3) Clinical presentation characteristics are:   Stable/Uncomplicated.  4) Decision-Making    Low complexity using standardized patient assessment instrument and/or measureable assessment of functional outcome.  Cumulative Therapy Evaluation is: Low complexity.    Previous and current functional limitations:  (See Goal Flow Sheet for this information)    Short term and Long term goals: (See Goal Flow Sheet for this information)     Communication ability:  Patient appears to be able to clearly communicate and understand verbal and written communication and follow directions correctly.  Treatment Explanation - The following has been discussed with the patient:   RX ordered/plan of care  Anticipated outcomes  Possible risks and side effects  This patient would benefit from PT intervention to resume normal activities.   Rehab  potential is excellent.    Frequency:  1 X week, once daily  Duration:  for 4 weeks  Discharge Plan:  Achieve all LTG.  Independent in home treatment program.  Reach maximal therapeutic benefit.    Please refer to the daily flowsheet for treatment today, total treatment time and time spent performing 1:1 timed codes.

## 2017-09-26 NOTE — LETTER
[facility] [place] [facility]  [address]  [id number]  [facility] [id number] [phone]    2017    Re: Jeffrey Conteh   :   1955  MRN:  [id number]   REFERRING PHYSICIAN:   Diogo Watson    [facility] [place] [facility] [place] [facility]    Date of Initial Evaluation: 17  Visits:  Rxs Used: 1  Reason for Referral:  Right peroneal tendinosis    EVALUATION SUMMARY    Subjective:  Jeffrey Conteh is a 62 year old male with a right ankle and right foot condition.  Condition occurred with:  Repetition/overuse.  Condition occurred: in the community.  This is a new condition  Patient is referred with a 3 week hx of R lateral foot and ankle pain. He thinks it may be due to hiking on uneven terrain. 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  Patient reports pain:  Lateral.  Radiates to:  Ankle.  Pain is described as sharp and aching and is intermittent and reported as 3/10.  Associated symptoms:  Loss of strength. Pain is worse during the day.  Symptoms are exacerbated by running and walking and relieved by rest.  Since onset symptoms are gradually improving. General health as reported by patient is excellent.  Pertinent medical history includes:  Osteoarthritis. Other surgeries include:  Orthopedic surgery (L STELLA, L Achilles repair).  Current medications:  Anti-inflammatory.  Patient is working in normal job without restrictions.  Primary job tasks include:  Prolonged standing.  Pertinent medical history includes:  History of fractures, implanted device and sleep disorder/apnea. Other surgeries include:  Orthopedic surgery (Hip replacement, ruptured left achiilles tendon).  Current medications:  Anti-inflammatory.  Current occupation is .        Barriers include:  None as reported by patient.  Red flags:  None as reported by patient.             Objective:  System  Ankle/Foot Evaluation  ROM:  AROM is normal.PROM is normal.  Strength:    Dorsiflexion:  Left: 5/5     Pain:   Right: 5/5   Pain:  Plantarflexion: Left: 5/5   Pain:   Right: 4+/5   Pain:++  Inversion:Left: 5/5  Pain:     Right: 5/5  Pain:  Eversion:Left: 5/5  Pain:  Right: 5/5   Pain:++          Re: Jeffrey Conteh   :   1955    LIGAMENT TESTING: not assessed  SPECIAL TESTS: not assessed  PALPATION: Palpation of ankle: right 5th metatarsal.  EDEMA:   Right ankle edema present at:  lateral    MOBILITY TESTING: normal  FUNCTIONAL TESTS:   Quad:  Bilateral Leg Squat:  Control is moderate loss of control and excessive anterior knee excursion  General   ROS    Assessment/Plan:    Patient is a 62 year old male with right side ankle complaints.    Patient has the following significant findings with corresponding treatment plan.                Diagnosis 1:  R peroneal tendinopathy  Pain -  US, manual therapy, splint/taping/bracing/orthotics, self management, education and home program  Decreased strength - therapeutic exercise, therapeutic activities and home program  Impaired gait - home program  Impaired muscle performance - neuro re-education and home program  Decreased function - therapeutic activities and home program    Therapy Evaluation Codes:   1) History comprised of:   Personal factors that impact the plan of care:      None.    Comorbidity factors that impact the plan of care are:      None.     Medications impacting care: None.  2) Examination of Body Systems comprised of:   Body structures and functions that impact the plan of care:      Ankle.   Activity limitations that impact the plan of care are:      Jumping, Running and Walking.  3) Clinical presentation characteristics are:   Stable/Uncomplicated.  4) Decision-Making    Low complexity using standardized patient assessment instrument and/or measureable assessment of functional outcome.  Cumulative Therapy Evaluation is: Low  complexity.    Communication ability:  Patient appears to be able to clearly communicate and understand verbal and written communication and follow directions correctly.  Treatment Explanation - The following has been discussed with the patient:   RX ordered/plan of care  Anticipated outcomes  Possible risks and side effects      Re: Jeffrey Conteh   :   1955    This patient would benefit from PT intervention to resume normal activities.   Rehab potential is excellent.  Frequency:  1 X week, once daily  Duration:  for 4 weeks  Discharge Plan:  Achieve all LTG.  Independent in home treatment program.  Reach maximal therapeutic benefit.        Thank you for your referral.    INQUIRIES  Therapist: Ruben Elam, PT   [facility] [place] [facility]  [address] [place] [address]  [address]  [place] [address]  Phone: [phone]  Fax: [phone]

## 2017-09-26 NOTE — PROGRESS NOTES
Subjective:    Patient is a 62 year old male presenting with rehab left ankle/foot hpi.                                      Pertinent medical history includes:  History of fractures, implanted device and sleep disorder/apnea.    Other surgeries include:  Orthopedic surgery (Hip replacement, ruptured left achiilles tendon).  Current medications:  Anti-inflammatory.  Current occupation is .    Primary job tasks include:  Prolonged standing.                                Objective:    System    Physical Exam    General     ROS    Assessment/Plan:

## 2017-10-04 ENCOUNTER — THERAPY VISIT (OUTPATIENT)
Dept: PHYSICAL THERAPY | Facility: CLINIC | Age: 62
End: 2017-10-04
Payer: COMMERCIAL

## 2017-10-04 DIAGNOSIS — M67.88 RIGHT PERONEAL TENDINOSIS: ICD-10-CM

## 2017-10-04 PROCEDURE — 97035 APP MDLTY 1+ULTRASOUND EA 15: CPT | Mod: GP | Performed by: PHYSICAL THERAPIST

## 2017-10-04 PROCEDURE — 97140 MANUAL THERAPY 1/> REGIONS: CPT | Mod: GP | Performed by: PHYSICAL THERAPIST

## 2017-10-04 PROCEDURE — 97033 APP MDLTY 1+IONTPHRSIS EA 15: CPT | Mod: GP | Performed by: PHYSICAL THERAPIST

## 2017-10-12 ENCOUNTER — THERAPY VISIT (OUTPATIENT)
Dept: PHYSICAL THERAPY | Facility: CLINIC | Age: 62
End: 2017-10-12
Payer: COMMERCIAL

## 2017-10-12 DIAGNOSIS — M67.88 RIGHT PERONEAL TENDINOSIS: ICD-10-CM

## 2017-10-12 PROCEDURE — 97033 APP MDLTY 1+IONTPHRSIS EA 15: CPT | Mod: GP | Performed by: PHYSICAL THERAPIST

## 2017-10-12 PROCEDURE — 97035 APP MDLTY 1+ULTRASOUND EA 15: CPT | Mod: GP | Performed by: PHYSICAL THERAPIST

## 2017-10-12 PROCEDURE — 97140 MANUAL THERAPY 1/> REGIONS: CPT | Mod: GP | Performed by: PHYSICAL THERAPIST

## 2017-10-18 ENCOUNTER — THERAPY VISIT (OUTPATIENT)
Dept: PHYSICAL THERAPY | Facility: CLINIC | Age: 62
End: 2017-10-18
Payer: COMMERCIAL

## 2017-10-18 DIAGNOSIS — M67.88 RIGHT PERONEAL TENDINOSIS: ICD-10-CM

## 2017-10-18 PROCEDURE — 97033 APP MDLTY 1+IONTPHRSIS EA 15: CPT | Mod: GP | Performed by: PHYSICAL THERAPIST

## 2017-10-18 PROCEDURE — 97140 MANUAL THERAPY 1/> REGIONS: CPT | Mod: GP | Performed by: PHYSICAL THERAPIST

## 2017-10-18 PROCEDURE — 97035 APP MDLTY 1+ULTRASOUND EA 15: CPT | Mod: GP | Performed by: PHYSICAL THERAPIST

## 2017-10-18 PROCEDURE — 97530 THERAPEUTIC ACTIVITIES: CPT | Mod: GP | Performed by: PHYSICAL THERAPIST

## 2018-01-22 ENCOUNTER — OFFICE VISIT (OUTPATIENT)
Dept: FAMILY MEDICINE | Facility: CLINIC | Age: 63
End: 2018-01-22
Payer: OTHER MISCELLANEOUS

## 2018-01-22 VITALS
SYSTOLIC BLOOD PRESSURE: 142 MMHG | BODY MASS INDEX: 30.09 KG/M2 | WEIGHT: 227 LBS | HEIGHT: 73 IN | TEMPERATURE: 97 F | OXYGEN SATURATION: 97 % | DIASTOLIC BLOOD PRESSURE: 86 MMHG | HEART RATE: 68 BPM

## 2018-01-22 DIAGNOSIS — S39.012A BACK STRAIN, INITIAL ENCOUNTER: Primary | ICD-10-CM

## 2018-01-22 PROCEDURE — 99214 OFFICE O/P EST MOD 30 MIN: CPT | Performed by: FAMILY MEDICINE

## 2018-01-22 RX ORDER — CYCLOBENZAPRINE HCL 10 MG
10 TABLET ORAL
Qty: 14 TABLET | Refills: 0 | Status: SHIPPED | OUTPATIENT
Start: 2018-01-22 | End: 2018-05-15

## 2018-01-22 NOTE — LETTER
To whom it may concern.      Jeffrey Conteh      1955            Patient is seen in the clinic 1/22/2018. He is advised no heavy lifting >10 pounds for next 1 week till 1/29/2018.                    Sincerely,         Doyle Silva MD   1/22/2018

## 2018-01-22 NOTE — MR AVS SNAPSHOT
After Visit Summary   1/22/2018    Jeffrey Conteh    MRN: [id number]           Patient Information     Date Of Birth          1955        Visit Information        Provider Department      1/22/2018 9:00 AM Doyle Silva MD [facility] [place]        Today's Diagnoses     Back strain, initial encounter    -  1       Follow-ups after your visit        Follow-up notes from your care team     Return in about 4 weeks (around 2/19/2018) for Physical Exam.      Your next 10 appointments already scheduled     Jan 29, 2018 10:00 AM CST   Office Visit with Doyle Silva MD   [facility] [place] ([facility])    [address] [place] [id number] [phone]           Bring a current list of meds and any records pertaining to this visit. For Physicals, please bring immunization records and any forms needing to be filled out. Please arrive 10 minutes early to complete paperwork.            Feb 07, 2018  8:20 AM CST   PHYSICAL with Doyle Silva MD   [facility] [place] ([facility])    [address] [place] [id number]   [phone]              Who to contact     If you have questions or need follow up information about today's clinic visit or your schedule please contact [facility] [place] directly at [phone].  Normal or non-critical lab and imaging results will be communicated to you by MyChart, letter or phone within 4 business days after the clinic has received the results. If you do not hear from us within 7 days, please contact the clinic through MyChart or phone. If you have a critical or abnormal lab result, we will notify you by phone as soon as possible.  Submit refill requests through [employer] or call your pharmacy and they will forward the refill request to us. Please allow 3 business days for your refill to be completed.          Additional Information About Your Visit       "  Appniquehart Information     [employer] lets you send messages to your doctor, view your test results, renew your prescriptions, schedule appointments and more. To sign up, go to [email or web address] . Click on \"Log in\" on the left side of the screen, which will take you to the Welcome page. Then click on \"Sign up Now\" on the right side of the page.     You will be asked to enter the access code listed below, as well as some personal information. Please follow the directions to create your username and password.     Your access code is: [email or web address]  Expires: 2018  9:37 AM     Your access code will  in 90 days. If you need help or a new code, please call your [place] clinic or [phone].        Care EveryWhere ID     This is your Care EveryWhere ID. This could be used by other organizations to access your [place] medical records  [id number]        Your Vitals Were     Pulse Temperature Height Pulse Oximetry BMI (Body Mass Index)       68 97  F (36.1  C) (Tympanic) 6' 1\" (1.854 m) 97% 29.95 kg/m2        Blood Pressure from Last 3 Encounters:   18 142/86   17 126/84   03/15/17 (!) 146/92    Weight from Last 3 Encounters:   18 227 lb (103 kg)   17 221 lb 12.8 oz (100.6 kg)   03/15/17 221 lb (100.2 kg)              Today, you had the following     No orders found for display         Today's Medication Changes          These changes are accurate as of: 18  9:37 AM.  If you have any questions, ask your nurse or doctor.               Start taking these medicines.        Dose/Directions    cyclobenzaprine 10 MG tablet   Commonly known as:  FLEXERIL   Used for:  Back strain, initial encounter   Started by:  Doyle Silva MD        Dose:  10 mg   Take 1 tablet (10 mg) by mouth nightly as needed for muscle spasms   Quantity:  14 tablet   Refills:  0            Where to get your medicines      These medications were sent to [id number] - [place], [place] - [address] AT [facility] [place] [address]  [address], [place] [address]     Phone:  [phone]     cyclobenzaprine 10 MG tablet                Primary Care Provider Office Phone # Fax #    Doyle Silva [phone]       [address]  [place] [address]        Equal Access to Services     [facility]: Hadii aad ku hadasho Soomaali, waaxda luqadaha, qaybta kaalmada adeegyada, waxay idiin hayaan adeeg kharash la'aan ah. So [facility] [phone].    ATENCIÓN: Si kimberly long, tiene a mendez disposición servicios gratuitos de asistencia lingüística. Llame al [phone].    We comply with applicable federal civil rights laws and [place] laws. We do not discriminate on the basis of race, color, national origin, age, disability, sex, sexual orientation, or gender identity.            Thank you!     [facility]. Our goal is always to provide you with excellent care. Hearing back from our patients is one way we can continue to improve our services. Please take a few minutes to complete the written survey that you may receive in the mail after your visit with us. Thank you!             Your Updated Medication List - Protect others around you: Learn how to safely use, store and throw away your medicines at www.disposemymeds.org.          This list is accurate as of: 1/22/18  9:38 AM.  Always use your most recent med list.                   Brand Name Dispense Instructions for use Diagnosis    ALEVE PO           aspirin 81 MG tablet      Take 81 mg by mouth daily        cyclobenzaprine 10 MG tablet    FLEXERIL    14 tablet    Take 1 tablet (10 mg) by mouth nightly as needed for muscle spasms    Back strain, initial encounter       Magnesium 500 MG Caps           triamcinolone 0.1 % ointment    KENALOG    15 g    Apply sparingly to affected area three times daily for 14 days.    Rash       TUMS ULTRA 1000 1000 MG Chew   Generic drug:   calcium carbonate antacid      Take 1 chew tab by mouth 2 times daily        vitamin D 1000 UNITS capsule      Take 1 capsule by mouth daily

## 2018-01-22 NOTE — LETTER
To whom it may concern.      Jeffrey Conteh      1955              Patient is seen in the clinic 1/22/2018. He is advised no heavy lifting >10 pounds for next 1 week till 1/29/2018. He has back strain. Follow up on 1/29/2018 for recheck.                  Sincerely,         Doyle Silva MD   1/22/2018

## 2018-01-22 NOTE — NURSING NOTE
"Chief Complaint   Patient presents with     Back Pain       Initial /90  Pulse 70  Temp 97  F (36.1  C) (Tympanic)  Ht 6' 1\" (1.854 m)  Wt 227 lb (103 kg)  SpO2 97%  BMI 29.95 kg/m2 Estimated body mass index is 29.95 kg/(m^2) as calculated from the following:    Height as of this encounter: 6' 1\" (1.854 m).    Weight as of this encounter: 227 lb (103 kg).  Medication Reconciliation: complete  "

## 2018-01-22 NOTE — PROGRESS NOTES
SUBJECTIVE:   Jeffrey Conteh is a 62 year old male who presents to clinic today for the following health issues:      Back Pain   Patient works in a nursing home.  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  No numbness tingling.  His pain has slightly stable but certain movements make it worse and especially bending and lifting makes it worse.    Duration: x 1/17         Specific cause: work-related    Description:   Location of pain: low back middle to  right and middle of back right  Character of pain: dull ache  Pain radiation:none  New numbness or weakness in legs, not attributed to pain:  no     Intensity: Currently 0/10, At its worst 7/10 with movement only     History:   Pain interferes with job: YES  History of back problems: recurrent self limited episodes of low back pain in the past  Any previous MRI or X-rays: No   Sees a specialist for back pain:  No  Therapies tried without relief: no     Alleviating factors:   Improved by: NSAIDs  Aleve     Precipitating factors:  Worsened by: Lifting, Bending and Lying Flat    Functional and Psychosocial Screen (Jake STarT Back):      Not performed today        Patient Active Problem List   Diagnosis     Sleep apnea     Diverticulitis of colon     Hyperlipidemia LDL goal <130     Neck arthritis (H)     Rosacea     Right Achilles tendinitis     Right peroneal tendinosis     Past Surgical History:   Procedure Laterality Date     APPENDECTOMY      2006 along with diverticulities      C TOTAL HIP ARTHROPLASTY      2005     SEPTOPLASTY         Social History   Substance Use Topics     Smoking status: Never Smoker     Smokeless tobacco: Never Used     Alcohol use 0.0 oz/week     0 Standard drinks or equivalent per week     Family History   Problem Relation Age of Onset     Hypertension Father      Parkinsonism Father      Dementia Father      CANCER Father      Skin     Ovarian Cancer Mother      CANCER Maternal Grandmother  "     brain tumor      CANCER Maternal Uncle      brain tumor          Current Outpatient Prescriptions   Medication Sig Dispense Refill     cyclobenzaprine (FLEXERIL) 10 MG tablet Take 1 tablet (10 mg) by mouth nightly as needed for muscle spasms 14 tablet 0     Naproxen Sodium (ALEVE PO)        Magnesium 500 MG CAPS        Cholecalciferol (VITAMIN D) 1000 UNITS capsule Take 1 capsule by mouth daily       calcium carbonate antacid (TUMS ULTRA 1000) 1000 MG CHEW Take 1 chew tab by mouth 2 times daily       aspirin 81 MG tablet Take 81 mg by mouth daily       triamcinolone (KENALOG) 0.1 % ointment Apply sparingly to affected area three times daily for 14 days. (Patient not taking: Reported on 1/22/2018) 15 g 1       Reviewed and updated as needed this visit by clinical staff       Reviewed and updated as needed this visit by Provider         ROS:  C: NEGATIVE for fever, chills, change in weight  R: NEGATIVE for significant cough or SOB  CV: NEGATIVE for chest pain, palpitations or peripheral edema  MUSCULOSKELETAL: low back pain    OBJECTIVE:                                                    /86  Pulse 68  Temp 97  F (36.1  C) (Tympanic)  Ht 6' 1\" (1.854 m)  Wt 227 lb (103 kg)  SpO2 97%  BMI 29.95 kg/m2  Body mass index is 29.95 kg/(m^2).   GENERAL: healthy, alert, well nourished, well hydrated, no distress  NECK: no tenderness, no adenopathy, no asymmetry, no masses, no stiffness; thyroid- normal to palpation  CV: regular rates and rhythm, normal S1 S2, no S3 or S4 and no murmur, no click or rub -  ABDOMEN: soft, no tenderness, no  hepatosplenomegaly, no masses, normal bowel sounds  No midline tenderness,mild para spinous tenderness. Normal flexion and extension.     ASSESSMENT/PLAN:                                                        ICD-10-CM    1. Back strain, initial encounter S39.012A cyclobenzaprine (FLEXERIL) 10 MG tablet       Patient most likely have low back strain with possible muscle " spasm.  He is advised rest and no lifting heavier than 10 pounds for the next 1 week.  Icing heating and Flexeril as needed.  He can use ibuprofen or similar medication as needed.  Follow-up in 1 week for recheck.    Doyle Silva MD  [facility]

## 2018-01-29 ENCOUNTER — OFFICE VISIT (OUTPATIENT)
Dept: FAMILY MEDICINE | Facility: CLINIC | Age: 63
End: 2018-01-29
Payer: OTHER MISCELLANEOUS

## 2018-01-29 VITALS
TEMPERATURE: 96 F | SYSTOLIC BLOOD PRESSURE: 148 MMHG | WEIGHT: 228 LBS | HEART RATE: 72 BPM | BODY MASS INDEX: 30.08 KG/M2 | DIASTOLIC BLOOD PRESSURE: 90 MMHG

## 2018-01-29 DIAGNOSIS — M62.830 BACK MUSCLE SPASM: Primary | ICD-10-CM

## 2018-01-29 PROCEDURE — 99213 OFFICE O/P EST LOW 20 MIN: CPT | Performed by: FAMILY MEDICINE

## 2018-01-29 NOTE — NURSING NOTE
"Chief Complaint   Patient presents with     Back Pain       Initial /90  Pulse 72  Temp 96  F (35.6  C) (Tympanic)  Wt 228 lb (103.4 kg)  BMI 30.08 kg/m2 Estimated body mass index is 30.08 kg/(m^2) as calculated from the following:    Height as of 1/22/18: 6' 1\" (1.854 m).    Weight as of this encounter: 228 lb (103.4 kg).  Medication Reconciliation: complete    Current Outpatient Prescriptions   Medication Sig Dispense Refill     cyclobenzaprine (FLEXERIL) 10 MG tablet Take 1 tablet (10 mg) by mouth nightly as needed for muscle spasms 14 tablet 0     Naproxen Sodium (ALEVE PO)        triamcinolone (KENALOG) 0.1 % ointment Apply sparingly to affected area three times daily for 14 days. (Patient not taking: Reported on 1/22/2018) 15 g 1     Magnesium 500 MG CAPS        Cholecalciferol (VITAMIN D) 1000 UNITS capsule Take 1 capsule by mouth daily       calcium carbonate antacid (TUMS ULTRA 1000) 1000 MG CHEW Take 1 chew tab by mouth 2 times daily       aspirin 81 MG tablet Take 81 mg by mouth daily         Lam RODRIGUEZ CMA  "

## 2018-01-29 NOTE — MR AVS SNAPSHOT
After Visit Summary   1/29/2018    Jeffrey Conteh    MRN: [id number]           Patient Information     Date Of Birth          1955        Visit Information        Provider Department      1/29/2018 10:00 AM Doyle Silva MD [facility] [place]        Today's Diagnoses     Back muscle spasm    -  1       Follow-ups after your visit        Follow-up notes from your care team     Return in about 8 days (around 2/6/2018).      Your next 10 appointments already scheduled     Feb 06, 2018  9:20 AM CST   Office Visit with Doyle Silva MD   [facility] [place] ([facility]    [address] [place] [id number]   [phone]           Bring a current list of meds and any records pertaining to this visit. For Physicals, please bring immunization records and any forms needing to be filled out. Please arrive 10 minutes early to complete paperwork.            Feb 07, 2018  8:20 AM CST   PHYSICAL with Doyle Silva MD   [facility] [place] ([facility]    [address] [place] [address]   [phone]              Who to contact     If you have questions or need follow up information about today's clinic visit or your schedule please contact [facility] [place] directly at [phone].  Normal or non-critical lab and imaging results will be communicated to you by MyChart, letter or phone within 4 business days after the clinic has received the results. If you do not hear from us within 7 days, please contact the clinic through My [employer] or phone. If you have a critical or abnormal lab result, we will notify you by phone as soon as possible.  Submit refill requests through [employer] or call your pharmacy and they will forward the refill request to us. Please allow 3 business days for your refill to be completed.          Additional Information About Your Visit        MyChart Information     [facility] lets  "you send messages to your doctor, view your test results, renew your prescriptions, schedule appointments and more. To sign up, go to [email or web address] . Click on \"Log in\" on the left side of the screen, which will take you to the Welcome page. Then click on \"Sign up Now\" on the right side of the page.     You will be asked to enter the access code listed below, as well as some personal information. Please follow the directions to create your username and password.     Your access code is: [email or web address]  Expires: 2018  9:37 AM     Your access code will  in 90 days. If you need help or a new code, please call your [place] clinic or [phone].        Care EveryWhere ID     This is your Care EveryWhere ID. This could be used by other organizations to access your [place] medical records  [id number]        Your Vitals Were     Pulse Temperature BMI (Body Mass Index)             72 96  F (35.6  C) (Tympanic) 30.08 kg/m2          Blood Pressure from Last 3 Encounters:   18 148/90   18 142/86   17 126/84    Weight from Last 3 Encounters:   18 228 lb (103.4 kg)   18 227 lb (103 kg)   17 221 lb 12.8 oz (100.6 kg)              Today, you had the following     No orders found for display       Primary Care Provider Office Phone # Fax #    Doyle Silva [phone]       [address]  [place] [address]        Equal Access to Services     [facility]: Hadii yong fitzgerald hadasho Sohayley, waaxda luqadaha, qaybta kaalmada darnell, walt delacruz. So [facility] [phone].    ATENCIÓN: Si habla español, tiene a mendez disposición servicios gratuitos de asistencia lingüística. Llame al [phone].    We comply with applicable federal civil rights laws and [place] laws. We do not discriminate on the basis of race, color, national origin, age, disability, sex, sexual orientation, or gender identity.            Thank you!     " Thank you for choosing [facility] [place]  for your care. Our goal is always to provide you with excellent care. Hearing back from our patients is one way we can continue to improve our services. Please take a few minutes to complete the written survey that you may receive in the mail after your visit with us. Thank you!             Your Updated Medication List - Protect others around you: Learn how to safely use, store and throw away your medicines at www.disposemymeds.org.          This list is accurate as of 1/29/18 10:27 AM.  Always use your most recent med list.                   Brand Name Dispense Instructions for use Diagnosis    ALEVE PO           aspirin 81 MG tablet      Take 81 mg by mouth daily        cyclobenzaprine 10 MG tablet    FLEXERIL    14 tablet    Take 1 tablet (10 mg) by mouth nightly as needed for muscle spasms    Back strain, initial encounter       Magnesium 500 MG Caps           triamcinolone 0.1 % ointment    KENALOG    15 g    Apply sparingly to affected area three times daily for 14 days.    Rash       TUMS ULTRA 1000 1000 MG Chew   Generic drug:  calcium carbonate antacid      Take 1 chew tab by mouth 2 times daily        vitamin D 1000 UNITS capsule      Take 1 capsule by mouth daily

## 2018-01-29 NOTE — PROGRESS NOTES
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  For the last 1 week he was on 10 pound weight restrictions.  He noticed it has helped him.  His symptoms are improved but still have some discomfort with bending.  No other neurological symptoms.    Duration: work comp injury 1/17        Specific cause: lifting    Description:   Location of pain: middle of back right  Character of pain: dull ache    Intensity:  moderate    History:   Pain interferes with job: YES  History of back problems: no prior back problems  Any previous MRI or X-rays: None  Sees a specialist for back pain:  No  Therapies tried without relief: none    Alleviating factors:   Improved by: NSAIDs  - Aleve    Precipitating factors:  Worsened by: Lifting, Bending and Lying Flat    Functional and Psychosocial Screen (Jake STarT Back):      Not performed today                Problem list and histories reviewed & adjusted, as indicated.  Additional history: as documented    Patient Active Problem List   Diagnosis     Sleep apnea     Diverticulitis of colon     Hyperlipidemia LDL goal <130     Neck arthritis (H)     Rosacea     Right Achilles tendinitis     Right peroneal tendinosis     Past Surgical History:   Procedure Laterality Date     APPENDECTOMY      2006 along with diverticulities      C TOTAL HIP ARTHROPLASTY      2005     SEPTOPLASTY         Social History   Substance Use Topics     Smoking status: Never Smoker     Smokeless tobacco: Never Used     Alcohol use 0.0 oz/week     0 Standard drinks or equivalent per week     Family History   Problem Relation Age of Onset     Hypertension Father      Parkinsonism Father      Dementia Father      CANCER Father      Skin     Ovarian Cancer Mother      CANCER Maternal Grandmother      brain tumor      CANCER Maternal Uncle      brain tumor          Current  Outpatient Prescriptions   Medication Sig Dispense Refill     cyclobenzaprine (FLEXERIL) 10 MG tablet Take 1 tablet (10 mg) by mouth nightly as needed for muscle spasms 14 tablet 0     Naproxen Sodium (ALEVE PO)        Magnesium 500 MG CAPS        Cholecalciferol (VITAMIN D) 1000 UNITS capsule Take 1 capsule by mouth daily       calcium carbonate antacid (TUMS ULTRA 1000) 1000 MG CHEW Take 1 chew tab by mouth 2 times daily       aspirin 81 MG tablet Take 81 mg by mouth daily       triamcinolone (KENALOG) 0.1 % ointment Apply sparingly to affected area three times daily for 14 days. (Patient not taking: Reported on 1/22/2018) 15 g 1       Reviewed and updated as needed this visit by clinical staff  Tobacco  Allergies  Med Hx  Surg Hx  Fam Hx  Soc Hx      Reviewed and updated as needed this visit by Provider         ROS:  C: NEGATIVE for fever, chills, change in weight  E/M: NEGATIVE for ear, mouth and throat problems  R: NEGATIVE for significant cough or SOB  CV: NEGATIVE for chest pain, palpitations or peripheral edema  MUSCULOSKELETAL: back pain    OBJECTIVE:                                                    /90  Pulse 72  Temp 96  F (35.6  C) (Tympanic)  Wt 228 lb (103.4 kg)  BMI 30.08 kg/m2  Body mass index is 30.08 kg/(m^2).   GENERAL: healthy, alert, well nourished, well hydrated, no distress  RESP: lungs clear to auscultation - no rales, no rhonchi, no wheezes  CV: regular rates and rhythm, normal S1 S2, no S3 or S4 and no murmur, no click or rub -  Lumbar discomfort.  Flexion and extension is normal.  Slight muscle spasm on the left side.  Otherwise improved and normal getting better.         ASSESSMENT/PLAN:                                                        ICD-10-CM    1. Back muscle spasm M62.830        Patient has back muscle spasm which is improving.  He is advised to continue using Flexeril.  Limit the weigh lifingt now to 30 pounds as compared to 10 pounds in the past.  Follow-up  in 1 week.  Icing heating.    Doyle Silva MD  [facility]

## 2018-01-29 NOTE — LETTER
To whom it may concern.      Jeffrey Conteh      1955            Patient is seen in the clinic 1/29/2018. His back strain is improved. Will increase the weight   limit to 30 pounds lifting, no excessive bending for next 1 week till 2/6 /2018.                    Sincerely,         Doyle Silva MD   1/29/2018

## 2018-02-06 ENCOUNTER — OFFICE VISIT (OUTPATIENT)
Dept: FAMILY MEDICINE | Facility: CLINIC | Age: 63
End: 2018-02-06
Payer: OTHER MISCELLANEOUS

## 2018-02-06 VITALS
SYSTOLIC BLOOD PRESSURE: 142 MMHG | WEIGHT: 223 LBS | BODY MASS INDEX: 29.42 KG/M2 | HEART RATE: 72 BPM | DIASTOLIC BLOOD PRESSURE: 90 MMHG | TEMPERATURE: 96.8 F

## 2018-02-06 DIAGNOSIS — S39.012D BACK STRAIN, SUBSEQUENT ENCOUNTER: Primary | ICD-10-CM

## 2018-02-06 PROCEDURE — 99213 OFFICE O/P EST LOW 20 MIN: CPT | Performed by: FAMILY MEDICINE

## 2018-02-06 NOTE — PROGRESS NOTES
SUBJECTIVE:   Jeffrey Conteh is a 62 year old male who presents to clinic today for the following health issues:      Back Pain       Duration: 1/17 injured at work         Specific cause: lifting    Description:   Location of pain: back   Character of pain: dull ache-improving   Pain radiation:none  New numbness or weakness in legs, not attributed to pain:  no     Intensity:  mild    History:   Pain interferes with job: no.  Limiting activity and decreasing weight lifting his back pain is now back to normal.  No further symptoms.  Denies any numbness or tingling.        Problem list and histories reviewed & adjusted, as indicated.      Patient Active Problem List   Diagnosis     Sleep apnea     Diverticulitis of colon     Hyperlipidemia LDL goal <130     Neck arthritis (H)     Rosacea     Right Achilles tendinitis     Right peroneal tendinosis     Past Surgical History:   Procedure Laterality Date     APPENDECTOMY      2006 along with diverticulities      C TOTAL HIP ARTHROPLASTY      2005     SEPTOPLASTY         Social History   Substance Use Topics     Smoking status: Never Smoker     Smokeless tobacco: Never Used     Alcohol use 0.0 oz/week     0 Standard drinks or equivalent per week     Family History   Problem Relation Age of Onset     Hypertension Father      Parkinsonism Father      Dementia Father      CANCER Father      Skin     Ovarian Cancer Mother      CANCER Maternal Grandmother      brain tumor      CANCER Maternal Uncle      brain tumor          Current Outpatient Prescriptions   Medication Sig Dispense Refill     cyclobenzaprine (FLEXERIL) 10 MG tablet Take 1 tablet (10 mg) by mouth nightly as needed for muscle spasms 14 tablet 0     Naproxen Sodium (ALEVE PO)        triamcinolone (KENALOG) 0.1 % ointment Apply sparingly to affected area three times daily for 14 days. (Patient not taking: Reported on 1/22/2018) 15 g 1     Magnesium 500 MG CAPS        Cholecalciferol (VITAMIN D) 1000 UNITS  capsule Take 1 capsule by mouth daily       calcium carbonate antacid (TUMS ULTRA 1000) 1000 MG CHEW Take 1 chew tab by mouth 2 times daily       aspirin 81 MG tablet Take 81 mg by mouth daily         Reviewed and updated as needed this visit by clinical staff  Tobacco  Allergies  Med Hx  Surg Hx  Fam Hx  Soc Hx      Reviewed and updated as needed this visit by Provider         ROS:  C: NEGATIVE for fever, chills, change in weight  E/M: NEGATIVE for ear, mouth and throat problems  R: NEGATIVE for significant cough or SOB  CV: NEGATIVE for chest pain, palpitations or peripheral edema    OBJECTIVE:                                                    /90  Pulse 72  Temp 96.8  F (36  C) (Tympanic)  Wt 223 lb (101.2 kg)  BMI 29.42 kg/m2  Body mass index is 29.42 kg/(m^2).   GENERAL: healthy, alert, well nourished, well hydrated, no distress  HENT: ear canals- normal; TMs- normal; Nose- normal; Mouth- no ulcers, no lesions  NECK: no tenderness, no adenopathy, no asymmetry, no masses, no stiffness; thyroid- normal to palpation  RESP: lungs clear to auscultation - no rales, no rhonchi, no wheezes  Normal strength in back. normal flexion and extension.         ASSESSMENT/PLAN:                                                        ICD-10-CM    1. Back strain, subsequent encounter S39.012D        Patient is significantly improved.  His back strength is back.  He is advised to continue stretching exercises.  He can go back to work without any limitations. No scheduled follow-up.  Follow-up only as needed.    Doyle Silva MD  [facility]

## 2018-02-06 NOTE — LETTER
To whom it may concern.      Jeffrey Conteh      1955            Patient is seen in the clinic 2/6/2018. His back strain and spasm is better, he can go back to work without any limitations                    Sincerely,         Doyle Silva MD   2/6/2018

## 2018-02-06 NOTE — MR AVS SNAPSHOT
"              After Visit Summary   2/6/2018    Jeffrey Conteh    MRN: [id number]           Patient Information     Date Of Birth          1955        Visit Information        Provider Department      2/6/2018 9:20 AM Doyle Silva MD [facility]        Today's Diagnoses     Back strain, subsequent encounter    -  1       Follow-ups after your visit        Follow-up notes from your care team     Return if symptoms worsen or fail to improve.      Your next 10 appointments already scheduled     Mar 08, 2018  9:00 AM CST   PHYSICAL with Doyle Silva MD   [facility] ([facility])    [address] [place] [id number] [phone]              Who to contact     If you have questions or need follow up information about today's clinic visit or your schedule please contact [facility] directly at [phone].  Normal or non-critical lab and imaging results will be communicated to you by MyChart, letter or phone within 4 business days after the clinic has received the results. If you do not hear from us within 7 days, please contact the clinic through [employer] or phone. If you have a critical or abnormal lab result, we will notify you by phone as soon as possible.  Submit refill requests through [employer] or call your pharmacy and they will forward the refill request to us. Please allow 3 business days for your refill to be completed.          Additional Information About Your Visit        [employer] Information     [employer] lets you send messages to your doctor, view your test results, renew your prescriptions, schedule appointments and more. To sign up, go to [email or web address] . Click on \"Log in\" on the left side of the screen, which will take you to the Welcome page. Then click on \"Sign up Now\" on the right side of the page.     You will be asked to enter the access code listed below, as well as some personal information. " Please follow the directions to create your username and password.     Your access code is: [email or web address]  Expires: 2018  9:37 AM     Your access code will  in 90 days. If you need help or a new code, please call your [place] clinic or [phone].        Care EveryWhere ID     This is your Care EveryWhere ID. This could be used by other organizations to access your [place] medical records  [id number]        Your Vitals Were     Pulse Temperature BMI (Body Mass Index)             72 96.8  F (36  C) (Tympanic) 29.42 kg/m2          Blood Pressure from Last 3 Encounters:   18 142/90   18 148/90   18 142/86    Weight from Last 3 Encounters:   18 223 lb (101.2 kg)   18 228 lb (103.4 kg)   18 227 lb (103 kg)              Today, you had the following     No orders found for display       Primary Care Provider Office Phone # Fax #    Doyle Silva [phone] [address]  [place] [address]        Equal Access to Services     [facility] AH: Hadii yong orozcoo Sohayley, waaxda luqadaha, qaybta kaalmada adeegyada, walt delacruz. So [facility] [phone].    ATENCIÓN: Si habla español, tiene a mendez disposición servicios gratuitos de asistencia lingüística. [facility] [phone].    We comply with applicable federal civil rights laws and [place] laws. We do not discriminate on the basis of race, color, national origin, age, disability, sex, sexual orientation, or gender identity.            Thank you!     Thank you for choosing [facility] [place]  for your care. Our goal is always to provide you with excellent care. Hearing back from our patients is one way we can continue to improve our services. Please take a few minutes to complete the written survey that you may receive in the mail after your visit with us. Thank you!             Your Updated Medication List - Protect others around you: Learn how to  safely use, store and throw away your medicines at www.disposemymeds.org.          This list is accurate as of 2/6/18  9:57 AM.  Always use your most recent med list.                   Brand Name Dispense Instructions for use Diagnosis    ALEVE PO           aspirin 81 MG tablet      Take 81 mg by mouth daily        cyclobenzaprine 10 MG tablet    FLEXERIL    14 tablet    Take 1 tablet (10 mg) by mouth nightly as needed for muscle spasms    Back strain, initial encounter       Magnesium 500 MG Caps           triamcinolone 0.1 % ointment    KENALOG    15 g    Apply sparingly to affected area three times daily for 14 days.    Rash       TUMS ULTRA 1000 1000 MG Chew   Generic drug:  calcium carbonate antacid      Take 1 chew tab by mouth 2 times daily        vitamin D 1000 UNITS capsule      Take 1 capsule by mouth daily

## 2018-02-06 NOTE — NURSING NOTE
"Chief Complaint   Patient presents with     Back Pain     work comp follow up        Initial /90  Pulse 72  Temp 96.8  F (36  C) (Tympanic)  Wt 223 lb (101.2 kg)  BMI 29.42 kg/m2 Estimated body mass index is 29.42 kg/(m^2) as calculated from the following:    Height as of 1/22/18: 6' 1\" (1.854 m).    Weight as of this encounter: 223 lb (101.2 kg).  Medication Reconciliation: complete    Current Outpatient Prescriptions   Medication Sig Dispense Refill     cyclobenzaprine (FLEXERIL) 10 MG tablet Take 1 tablet (10 mg) by mouth nightly as needed for muscle spasms 14 tablet 0     Naproxen Sodium (ALEVE PO)        triamcinolone (KENALOG) 0.1 % ointment Apply sparingly to affected area three times daily for 14 days. (Patient not taking: Reported on 1/22/2018) 15 g 1     Magnesium 500 MG CAPS        Cholecalciferol (VITAMIN D) 1000 UNITS capsule Take 1 capsule by mouth daily       calcium carbonate antacid (TUMS ULTRA 1000) 1000 MG CHEW Take 1 chew tab by mouth 2 times daily       aspirin 81 MG tablet Take 81 mg by mouth daily         Lam RODRIGUEZ CMA  "

## 2018-05-15 ENCOUNTER — OFFICE VISIT (OUTPATIENT)
Dept: FAMILY MEDICINE | Facility: CLINIC | Age: 63
End: 2018-05-15
Payer: COMMERCIAL

## 2018-05-15 VITALS
SYSTOLIC BLOOD PRESSURE: 138 MMHG | BODY MASS INDEX: 26.51 KG/M2 | DIASTOLIC BLOOD PRESSURE: 80 MMHG | TEMPERATURE: 96.7 F | WEIGHT: 200 LBS | HEART RATE: 60 BPM | HEIGHT: 73 IN

## 2018-05-15 DIAGNOSIS — Z00.00 ENCOUNTER FOR ANNUAL PHYSICAL EXAM: Primary | ICD-10-CM

## 2018-05-15 DIAGNOSIS — E78.5 HYPERLIPIDEMIA LDL GOAL <130: ICD-10-CM

## 2018-05-15 DIAGNOSIS — Z11.59 NEED FOR HEPATITIS C SCREENING TEST: ICD-10-CM

## 2018-05-15 PROBLEM — M67.88 RIGHT PERONEAL TENDINOSIS: Status: RESOLVED | Noted: 2017-09-26 | Resolved: 2018-05-15

## 2018-05-15 PROCEDURE — 80061 LIPID PANEL: CPT | Performed by: FAMILY MEDICINE

## 2018-05-15 PROCEDURE — 99396 PREV VISIT EST AGE 40-64: CPT | Performed by: FAMILY MEDICINE

## 2018-05-15 PROCEDURE — G0103 PSA SCREENING: HCPCS | Performed by: FAMILY MEDICINE

## 2018-05-15 PROCEDURE — 86803 HEPATITIS C AB TEST: CPT | Performed by: FAMILY MEDICINE

## 2018-05-15 PROCEDURE — 36415 COLL VENOUS BLD VENIPUNCTURE: CPT | Performed by: FAMILY MEDICINE

## 2018-05-15 PROCEDURE — 80053 COMPREHEN METABOLIC PANEL: CPT | Performed by: FAMILY MEDICINE

## 2018-05-15 RX ORDER — MULTIPLE VITAMINS W/ MINERALS TAB 9MG-400MCG
1 TAB ORAL EVERY MORNING
COMMUNITY

## 2018-05-15 NOTE — LETTER
May 17, 2018      Jeffrey Conteh  [address]   [place] [address]        Dear ,      I have reviewed your recent labs. Here are the results:     -Liver and gallbladder tests are normal. (ALT,AST, Alk phos, bilirubin), kidney function is normal (Cr, GFR), Sodium is normal, Potassium is normal, Calcium is normal, Glucose is normal (diabetes screening test).   -Cholesterol levels (LDL,HDL, Triglycerides) are normal.  ADVISE: rechecking in 1 year. LDL is at goal.   -PSA (prostate specific antigen) test is normal.  This indicates a low likelihood of prostate cancer.  ADVISE: yearly recheck.   -Hepatitis C antibody screen test shows no signs of a previous hepatitis C infection.     Resulted Orders   Hepatitis C Screen Reflex to HCV RNA Quant and Genotype   Result Value Ref Range    Hepatitis C Antibody Nonreactive NR^Nonreactive      Comment:      Assay performance characteristics have not been established for newborns,   infants, and children     Lipid Profile (Chol, Trig, HDL, LDL calc)   Result Value Ref Range    Cholesterol 184 <200 mg/dL    Triglycerides 70 <150 mg/dL      Comment:      Fasting specimen    HDL Cholesterol 54 >39 mg/dL    LDL Cholesterol Calculated 116 (H) <100 mg/dL      Comment:      Above desirable:  100-129 mg/dl  Borderline High:  130-159 mg/dL  High:             160-189 mg/dL  Very high:       >189 mg/dl      Non HDL Cholesterol 130 (H) <130 mg/dL      Comment:      Above Desirable:  130-159 mg/dl  Borderline high:  160-189 mg/dl  High:             190-219 mg/dl  Very high:       >219 mg/dl     Comprehensive metabolic panel   Result Value Ref Range    Sodium 140 133 - 144 mmol/L    Potassium 3.9 3.4 - 5.3 mmol/L    Chloride 106 94 - 109 mmol/L    Carbon Dioxide 23 20 - 32 mmol/L    Anion Gap 11 3 - 14 mmol/L    Glucose 81 70 - 99 mg/dL      Comment:      Fasting specimen    Urea Nitrogen 15 7 - 30 mg/dL    Creatinine 0.93 0.66 - 1.25 mg/dL    GFR Estimate 83 >60  mL/min/1.7m2      Comment:      Non  GFR Calc    GFR Estimate If Black >90 >60 mL/min/1.7m2      Comment:       GFR Calc    Calcium 9.3 8.5 - 10.1 mg/dL    Bilirubin Total 0.9 0.2 - 1.3 mg/dL    Albumin 4.1 3.4 - 5.0 g/dL    Protein Total 7.3 6.8 - 8.8 g/dL    Alkaline Phosphatase 89 40 - 150 U/L    ALT 29 0 - 70 U/L    AST 19 0 - 45 U/L   PSA, screen   Result Value Ref Range    PSA 0.57 0 - 4 ug/L      Comment:      Assay Method:  Chemiluminescence using Siemens Vista analyzer       If you have any questions or concerns, please call the clinic at the number listed above.       Sincerely,    Doyle Silva MD

## 2018-05-15 NOTE — MR AVS SNAPSHOT
After Visit Summary   5/15/2018    Jeffrey Conteh    MRN: [id number]           Patient Information     Date Of Birth          1955        Visit Information        Provider Department      5/15/2018 10:00 AM Doyle Silva MD [facility] [place] [place]        Today's Diagnoses     Encounter for annual physical exam    -  1    Need for hepatitis C screening test        Hyperlipidemia LDL goal <130          Care Instructions      Preventive Health Recommendations  Male Ages 50 - 64    Yearly exam:             See your health care provider every year in order to  o   Review health changes.   o   Discuss preventive care.    o   Review your medicines if your doctor has prescribed any.     Have a cholesterol test every 5 years, or more frequently if you are at risk for high cholesterol/heart disease.     Have a diabetes test (fasting glucose) every three years. If you are at risk for diabetes, you should have this test more often.     Have a colonoscopy at age 50, or have a yearly FIT test (stool test). These exams will check for colon cancer.      Talk with your health care provider about whether or not a prostate cancer screening test (PSA) is right for you.    You should be tested each year for STDs (sexually transmitted diseases), if you re at risk.     Shots: Get a flu shot each year. Get a tetanus shot every 10 years.     Nutrition:    Eat at least 5 servings of fruits and vegetables daily.     Eat whole-grain bread, whole-wheat pasta and brown rice instead of white grains and rice.     Talk to your provider about Calcium and Vitamin D.     Lifestyle    Exercise for at least 150 minutes a week (30 minutes a day, 5 days a week). This will help you control your weight and prevent disease.     Limit alcohol to one drink per day.     No smoking.     Wear sunscreen to prevent skin cancer.     See your dentist every six months for an exam and cleaning.     See your eye doctor every 1 to 2 years.          "   Follow-ups after your visit        Follow-up notes from your care team     Return in about 1 year (around 5/15/2019) for Physical Exam.      Who to contact     If you have questions or need follow up information about today's clinic visit or your schedule please contact [facility] [place] directly at [phone].  Normal or non-critical lab and imaging results will be communicated to you by MyChart, letter or phone within 4 business days after the clinic has received the results. If you do not hear from us within 7 days, please contact the clinic through [employer] or phone. If you have a critical or abnormal lab result, we will notify you by phone as soon as possible.  Submit refill requests through [employer] or call your pharmacy and they will forward the refill request to us. Please allow 3 business days for your refill to be completed.          Additional Information About Your Visit        [employer] Information     [employer] lets you send messages to your doctor, view your test results, renew your prescriptions, schedule appointments and more. To sign up, go to [email or web address] . Click on \"Log in\" on the left side of the screen, which will take you to the Welcome page. Then click on \"Sign up Now\" on the right side of the page.     You will be asked to enter the access code listed below, as well as some personal information. Please follow the directions to create your username and password.     Your access code is: [email or web address]  Expires: 2018 10:36 AM     Your access code will  in 90 days. If you need help or a new code, please call your [place] clinic or [phone].        Care EveryWhere ID     This is your Care EveryWhere ID. This could be used by other organizations to access your [place] medical records  [id number]        Your Vitals Were     Pulse Temperature Height BMI (Body Mass Index)          60 96.7  F (35.9  C) (Tympanic) 6' 0.64\" (1.845 m) 26.65 kg/m2         Blood " Pressure from Last 3 Encounters:   05/15/18 138/80   02/06/18 142/90   01/29/18 148/90    Weight from Last 3 Encounters:   05/15/18 200 lb (90.7 kg)   02/06/18 223 lb (101.2 kg)   01/29/18 228 lb (103.4 kg)              We Performed the Following     Comprehensive metabolic panel     Hepatitis C Screen Reflex to HCV RNA Quant and Genotype     Lipid Profile (Chol, Trig, HDL, LDL calc)     PSA, screen          Today's Medication Changes          These changes are accurate as of 5/15/18 10:36 AM.  If you have any questions, ask your nurse or doctor.               Stop taking these medicines if you haven't already. Please contact your care team if you have questions.     cyclobenzaprine 10 MG tablet   Commonly known as:  FLEXERIL   Stopped by:  Doyle Silva MD                    Primary Care Provider Office Phone # Fax #    Doyle Silva [phone] [address]  [place] [address]        Equal Access to Services     [facility] AH: Hadii aad ku hadasho Soomaali, waaxda luqadaha, qaybta kaalmada adeegyada, waxay idiin haymedina stein . So [facility] [phone].    ATENCIÓN: Si habla español, tiene a mendez disposición servicios gratuitos de asistencia lingüística. Llame al [phone].    We comply with applicable federal civil rights laws and [place] laws. We do not discriminate on the basis of race, color, national origin, age, disability, sex, sexual orientation, or gender identity.            Thank you!     Thank you for choosing [facility] [place]  for your care. Our goal is always to provide you with excellent care. Hearing back from our patients is one way we can continue to improve our services. Please take a few minutes to complete the written survey that you may receive in the mail after your visit with us. Thank you!             Your Updated Medication List - Protect others around you: Learn how to safely use, store and throw away your medicines at  www.disposemymeds.org.          This list is accurate as of 5/15/18 10:36 AM.  Always use your most recent med list.                   Brand Name Dispense Instructions for use Diagnosis    ALEVE PO           aspirin 81 MG tablet      Take 81 mg by mouth daily        Magnesium 500 MG Caps           Multi-vitamin Tabs tablet      Take 1 tablet by mouth daily        triamcinolone 0.1 % ointment    KENALOG    15 g    Apply sparingly to affected area three times daily for 14 days.    Rash       TUMS ULTRA 1000 1000 MG Chew   Generic drug:  calcium carbonate antacid      Take 1 chew tab by mouth 2 times daily        vitamin D 1000 units capsule      Take 1 capsule by mouth daily

## 2018-05-15 NOTE — PROGRESS NOTES
SUBJECTIVE:   CC: Jeffrey Conteh is an 62 year old male who presents for preventative health visit.     Healthy Habits:    Do you get at least three servings of calcium containing foods daily (dairy, green leafy vegetables, etc.)? yes    Amount of exercise or daily activities, outside of work: 3 day(s) per week    Problems taking medications regularly not applicable    Medication side effects: No    Have you had an eye exam in the past two years? yes    Do you see a dentist twice per year? yes    Do you have sleep apnea, excessive snoring or daytime drowsiness?YES-wears cpap machine        No concerns , lost some weight by eating more healthy. Had elevated cholesterol in the past, but he is hoping, it will be improved.    Today's PHQ-2 Score:   PHQ-2 ( 1999 Pfizer) 5/15/2018 8/7/2017   Q1: Little interest or pleasure in doing things 0 0   Q2: Feeling down, depressed or hopeless 0 0   PHQ-2 Score 0 0       Abuse: Current or Past(Physical, Sexual or Emotional)- NO  Do you feel safe in your environment - Yes    Social History   Substance Use Topics     Smoking status: Never Smoker     Smokeless tobacco: Never Used     Alcohol use 0.0 oz/week     0 Standard drinks or equivalent per week      Comment: less than weekly       If you drink alcohol do you typically have >3 drinks per day or >7 drinks per week? No                      Last PSA:   PSA   Date Value Ref Range Status   06/04/2015 0.46 0 - 4 ug/L Final       Reviewed orders with patient. Reviewed health maintenance and updated orders accordingly - Yes      Reviewed and updated as needed this visit by clinical staff  Tobacco  Allergies  Meds  Fam Hx  Soc Hx        Reviewed and updated as needed this visit by Provider            ROS:  C: NEGATIVE for fever, chills, change in weight  I: NEGATIVE for worrisome rashes, moles or lesions  E: NEGATIVE for vision changes or irritation  ENT: NEGATIVE for ear, mouth and throat problems  R: NEGATIVE for significant  "cough or SOB  CV: NEGATIVE for chest pain, palpitations or peripheral edema  GI: NEGATIVE for nausea, abdominal pain, heartburn, or change in bowel habits   male: negative for dysuria, hematuria, decreased urinary stream, erectile dysfunction, urethral discharge  M: NEGATIVE for significant arthralgias or myalgia  N: NEGATIVE for weakness, dizziness or paresthesias  P: NEGATIVE for changes in mood or affect    OBJECTIVE:   /80  Pulse 60  Temp 96.7  F (35.9  C) (Tympanic)  Ht 6' 0.64\" (1.845 m)  Wt 200 lb (90.7 kg)  BMI 26.65 kg/m2  EXAM:  GENERAL: healthy, alert and no distress  EYES: Eyes grossly normal to inspection, PERRL and conjunctivae and sclerae normal  HENT: ear canals and TM's normal, nose and mouth without ulcers or lesions  NECK: no adenopathy, no asymmetry, masses, or scars and thyroid normal to palpation  RESP: lungs clear to auscultation - no rales, rhonchi or wheezes  BREAST: normal without masses, tenderness or nipple discharge and no palpable axillary masses or adenopathy  CV: regular rate and rhythm, normal S1 S2, no S3 or S4, no murmur, click or rub, no peripheral edema and peripheral pulses strong  ABDOMEN: soft, nontender, no hepatosplenomegaly, no masses and bowel sounds normal  MS: no gross musculoskeletal defects noted, no edema  SKIN: no suspicious lesions or rashes  NEURO: Normal strength and tone, mentation intact and speech normal  PSYCH: mentation appears normal, affect normal/bright    ASSESSMENT/PLAN:   1. Encounter for annual physical exam  Inquired about shingrex vaccination.He will contact the pharmacy and see if it is covered or not.  - Hepatitis C Screen Reflex to HCV RNA Quant and Genotype  - Lipid Profile (Chol, Trig, HDL, LDL calc)  - Comprehensive metabolic panel  - PSA, screen    2. Need for hepatitis C screening test    - Hepatitis C Screen Reflex to HCV RNA Quant and Genotype    3. Hyperlipidemia LDL goal <130    - Lipid Profile (Chol, Trig, HDL, LDL calc)  - " "Comprehensive metabolic panel    COUNSELING:  Reviewed preventive health counseling, as reflected in patient instructions       Regular exercise       Healthy diet/nutrition       reports that he has never smoked. He has never used smokeless tobacco.    Estimated body mass index is 26.65 kg/(m^2) as calculated from the following:    Height as of this encounter: 6' 0.64\" (1.845 m).    Weight as of this encounter: 200 lb (90.7 kg).       Counseling Resources:  ATP IV Guidelines  Pooled Cohorts Equation Calculator  FRAX Risk Assessment  ICSI Preventive Guidelines  Dietary Guidelines for Americans, 2010  USDA's MyPlate  ASA Prophylaxis  Lung CA Screening    Doyle Silva MD  [facility] [place]  "

## 2018-05-16 LAB
ALBUMIN SERPL-MCNC: 4.1 G/DL (ref 3.4–5)
ALP SERPL-CCNC: 89 U/L (ref 40–150)
ALT SERPL W P-5'-P-CCNC: 29 U/L (ref 0–70)
ANION GAP SERPL CALCULATED.3IONS-SCNC: 11 MMOL/L (ref 3–14)
AST SERPL W P-5'-P-CCNC: 19 U/L (ref 0–45)
BILIRUB SERPL-MCNC: 0.9 MG/DL (ref 0.2–1.3)
BUN SERPL-MCNC: 15 MG/DL (ref 7–30)
CALCIUM SERPL-MCNC: 9.3 MG/DL (ref 8.5–10.1)
CHLORIDE SERPL-SCNC: 106 MMOL/L (ref 94–109)
CHOLEST SERPL-MCNC: 184 MG/DL
CO2 SERPL-SCNC: 23 MMOL/L (ref 20–32)
CREAT SERPL-MCNC: 0.93 MG/DL (ref 0.66–1.25)
GFR SERPL CREATININE-BSD FRML MDRD: 83 ML/MIN/1.7M2
GLUCOSE SERPL-MCNC: 81 MG/DL (ref 70–99)
HCV AB SERPL QL IA: NONREACTIVE
HDLC SERPL-MCNC: 54 MG/DL
LDLC SERPL CALC-MCNC: 116 MG/DL
NONHDLC SERPL-MCNC: 130 MG/DL
POTASSIUM SERPL-SCNC: 3.9 MMOL/L (ref 3.4–5.3)
PROT SERPL-MCNC: 7.3 G/DL (ref 6.8–8.8)
SODIUM SERPL-SCNC: 140 MMOL/L (ref 133–144)
TRIGL SERPL-MCNC: 70 MG/DL

## 2018-05-17 LAB — PSA SERPL-ACNC: 0.57 UG/L (ref 0–4)

## 2019-01-31 ENCOUNTER — TRANSFERRED RECORDS (OUTPATIENT)
Dept: HEALTH INFORMATION MANAGEMENT | Facility: CLINIC | Age: 64
End: 2019-01-31

## 2019-06-27 ENCOUNTER — OFFICE VISIT (OUTPATIENT)
Dept: FAMILY MEDICINE | Facility: CLINIC | Age: 64
End: 2019-06-27
Payer: COMMERCIAL

## 2019-06-27 ENCOUNTER — NURSE TRIAGE (OUTPATIENT)
Dept: FAMILY MEDICINE | Facility: CLINIC | Age: 64
End: 2019-06-27

## 2019-06-27 VITALS
HEIGHT: 73 IN | DIASTOLIC BLOOD PRESSURE: 90 MMHG | WEIGHT: 210 LBS | TEMPERATURE: 97 F | SYSTOLIC BLOOD PRESSURE: 146 MMHG | BODY MASS INDEX: 27.83 KG/M2 | HEART RATE: 69 BPM | OXYGEN SATURATION: 97 %

## 2019-06-27 DIAGNOSIS — S00.83XD CONTUSION OF OTHER PART OF HEAD, SUBSEQUENT ENCOUNTER: Primary | ICD-10-CM

## 2019-06-27 PROCEDURE — 99213 OFFICE O/P EST LOW 20 MIN: CPT | Performed by: FAMILY MEDICINE

## 2019-06-27 ASSESSMENT — MIFFLIN-ST. JEOR: SCORE: 1800.08

## 2019-06-27 NOTE — PROGRESS NOTES
"Subjective     Jeffrey Conteh is a 64 year old male who presents to clinic today for the following health issues:    HPI   Concern - head injury  Onset: 4 days ago    Description:   Got out of bed and and hit lower temple/upper right cheek     Progression of Symptoms:  improving    Therapies Tried and outcome: ice        Reviewed and updated as needed this visit by Provider         Review of Systems   ROS COMP: CONSTITUTIONAL:NEGATIVE for fever, chills, change in weight  INTEGUMENTARY/SKIN: NEGATIVE for worrisome rashes, moles or lesions  EYES: NEGATIVE for vision changes or irritation  MUSCULOSKELETAL: NEGATIVE for significant arthralgias or myalgia  NEURO: NEGATIVE for weakness, dizziness or paresthesias      Objective    /90   Pulse 69   Temp 97  F (36.1  C) (Tympanic)   Ht 1.844 m (6' 0.6\")   Wt 95.3 kg (210 lb)   SpO2 97%   BMI 28.01 kg/m    Body mass index is 28.01 kg/m .  Physical Exam   GENERAL: healthy, alert and no distress  NECK: no adenopathy, no asymmetry, masses, or scars and thyroid normal to palpation  MS: no gross musculoskeletal defects noted, no edema  NEURO: Normal strength and tone, mentation intact and speech normal  No obvious abnormality in skull area, no brusing        Assessment & Plan       ICD-10-CM    1. Contusion of other part of head, subsequent encounter S00.83XD      Patient is reassured he does not have any obvious abnormality.  I advised him to use icing and ibuprofen as needed.  Follow-up if any new or comes.  No neurological symptoms  BMI:   Estimated body mass index is 28.01 kg/m  as calculated from the following:    Height as of this encounter: 1.844 m (6' 0.6\").    Weight as of this encounter: 95.3 kg (210 lb).     Doyle Silva MD  [facility] [place]      "

## 2019-06-27 NOTE — TELEPHONE ENCOUNTER
"    Additional Information    Negative: ACUTE NEUROLOGIC SYMPTOM and symptom present now    Negative: Knocked out (unconscious) > 1 minute    Negative: Seizure (convulsion) occurred (Exception: prior history of seizures and now alert and without Acute Neurologic Symptoms)    Negative: Neck pain after dangerous injury (e.g., MVA, diving, trampoline, contact sports, fall > 10 feet or 3 meters) (Exception: neck pain began > 1 hour after injury)    Negative: Major bleeding (actively dripping or spurting) that can't be stopped    Negative: Penetrating head injury (e.g., knife, gunshot wound, metal object)    Negative: Sounds like a life-threatening emergency to the triager    Negative: Recently examined and diagnosed with a concussion by a healthcare provider and has questions about concussion symptoms    Negative: Can't remember what happened (amnesia)    Negative: Vomiting once or more    Negative: Watery or blood-tinged fluid dripping from the nose or ears    Negative: ACUTE NEUROLOGIC SYMPTOM and now fine    Negative: Knocked out (unconscious) < 1 minute and now fine    Negative: Severe headache    Negative: Dangerous injury (e.g., MVA, diving, trampoline, contact sports, fall > 10 feet or 3 meters) or severe blow from hard object (e.g., golf club or baseball bat)    Negative: Large swelling or bruise > 2 inches (5 cm)    Negative: Skin is split open or gaping (length > 1/2 inch or 12 mm)    Negative: Bleeding won't stop after 10 minutes of direct pressure (using correct technique)    Negative: One or two 'black eyes' (bruising, purple color of eyelids)    Negative: Taking Coumadin (warfarin) or other strong blood thinner, or known bleeding disorder (e.g., thrombocytopenia)    Negative: Age over 65 years with and area of head swelling or bruise    Negative: Sounds like a serious injury to the triager    Patient wants to be seen    Answer Assessment - Initial Assessment Questions  1. MECHANISM: \"How did the injury " "happen?\" For falls, ask: \"What height did you fall from?\" and \"What surface did you fall against?\"       College reunion. Staying in dorms. Not drunk. Get off bed into sleeping clothes. Bed was higher than used to. Chair next to bed. Hit lower temple/upper cheek on right side head pretty hard. More like the high end of the cheek bone. No skin laceration.  2. ONSET: \"When did the injury happen?\" (Minutes or hours ago)       Sunday 2 am  3. NEUROLOGIC SYMPTOMS: \"Was there any loss of consciousness?\" \"Are there any other neurological symptoms?\"       no  4. MENTAL STATUS: \"Does the person know who he is, who you are, and where he is?\"       yes  5. LOCATION: \"What part of the head was hit?\"       Right upper cheek, lower edge of temple  6. SCALP APPEARANCE: \"What does the scalp look like? Is it bleeding now?\" If so, ask: \"Is it difficult to stop?\"       No laceration. Bruise  7. SIZE: For cuts, bruises, or swelling, ask: \"How large is it?\" (e.g., inches or centimeters)       Not assessed  8. PAIN: \"Is there any pain?\" If so, ask: \"How bad is it?\"  (e.g., Scale 1-10; or mild, moderate, severe)      Improving, just thought he should get it checked out  9. TETANUS: For any breaks in the skin, ask: \"When was the last tetanus booster?\"      No skin breaks  10. OTHER SYMPTOMS: \"Do you have any other symptoms?\" (e.g., neck pain, vomiting)        No other symptoms. Denies headache, dizziness, nausea or vomiting  11. PREGNANCY: \"Is there any chance you are pregnant?\" \"When was your last menstrual period?\"        NA    Protocols used: HEAD INJURY-A-[place]  Daniela Moody RN    "

## 2019-09-07 ENCOUNTER — OFFICE VISIT (OUTPATIENT)
Dept: URGENT CARE | Facility: URGENT CARE | Age: 64
End: 2019-09-07
Payer: COMMERCIAL

## 2019-09-07 VITALS
HEART RATE: 72 BPM | OXYGEN SATURATION: 97 % | WEIGHT: 210 LBS | BODY MASS INDEX: 28.01 KG/M2 | TEMPERATURE: 98.6 F | SYSTOLIC BLOOD PRESSURE: 141 MMHG | DIASTOLIC BLOOD PRESSURE: 85 MMHG

## 2019-09-07 DIAGNOSIS — R10.32 LLQ ABDOMINAL PAIN: Primary | ICD-10-CM

## 2019-09-07 LAB
ALBUMIN SERPL-MCNC: 3.4 G/DL (ref 3.4–5)
ALBUMIN UR-MCNC: NEGATIVE MG/DL
ALP SERPL-CCNC: 103 U/L (ref 40–150)
ALT SERPL W P-5'-P-CCNC: 19 U/L (ref 0–70)
ANION GAP SERPL CALCULATED.3IONS-SCNC: 4 MMOL/L (ref 3–14)
APPEARANCE UR: CLEAR
AST SERPL W P-5'-P-CCNC: 11 U/L (ref 0–45)
BASOPHILS # BLD AUTO: 0 10E9/L (ref 0–0.2)
BASOPHILS NFR BLD AUTO: 0.5 %
BILIRUB SERPL-MCNC: 0.5 MG/DL (ref 0.2–1.3)
BILIRUB UR QL STRIP: NEGATIVE
BUN SERPL-MCNC: 12 MG/DL (ref 7–30)
CALCIUM SERPL-MCNC: 8.8 MG/DL (ref 8.5–10.1)
CHLORIDE SERPL-SCNC: 108 MMOL/L (ref 94–109)
CO2 SERPL-SCNC: 28 MMOL/L (ref 20–32)
COLOR UR AUTO: YELLOW
CREAT SERPL-MCNC: 0.92 MG/DL (ref 0.66–1.25)
DIFFERENTIAL METHOD BLD: NORMAL
EOSINOPHIL # BLD AUTO: 0.3 10E9/L (ref 0–0.7)
EOSINOPHIL NFR BLD AUTO: 3 %
ERYTHROCYTE [DISTWIDTH] IN BLOOD BY AUTOMATED COUNT: 13.1 % (ref 10–15)
GFR SERPL CREATININE-BSD FRML MDRD: 89 ML/MIN/{1.73_M2}
GLUCOSE SERPL-MCNC: 99 MG/DL (ref 70–99)
GLUCOSE UR STRIP-MCNC: NEGATIVE MG/DL
HCT VFR BLD AUTO: 45.8 % (ref 40–53)
HGB BLD-MCNC: 16.1 G/DL (ref 13.3–17.7)
HGB UR QL STRIP: ABNORMAL
KETONES UR STRIP-MCNC: NEGATIVE MG/DL
LEUKOCYTE ESTERASE UR QL STRIP: NEGATIVE
LIPASE SERPL-CCNC: 88 U/L (ref 73–393)
LYMPHOCYTES # BLD AUTO: 1.4 10E9/L (ref 0.8–5.3)
LYMPHOCYTES NFR BLD AUTO: 17.1 %
MCH RBC QN AUTO: 30.1 PG (ref 26.5–33)
MCHC RBC AUTO-ENTMCNC: 35.2 G/DL (ref 31.5–36.5)
MCV RBC AUTO: 86 FL (ref 78–100)
MONOCYTES # BLD AUTO: 1 10E9/L (ref 0–1.3)
MONOCYTES NFR BLD AUTO: 12.5 %
NEUTROPHILS # BLD AUTO: 5.5 10E9/L (ref 1.6–8.3)
NEUTROPHILS NFR BLD AUTO: 66.9 %
NITRATE UR QL: NEGATIVE
PH UR STRIP: 5.5 PH (ref 5–7)
PLATELET # BLD AUTO: 284 10E9/L (ref 150–450)
POTASSIUM SERPL-SCNC: 3.8 MMOL/L (ref 3.4–5.3)
PROT SERPL-MCNC: 7.3 G/DL (ref 6.8–8.8)
RBC # BLD AUTO: 5.35 10E12/L (ref 4.4–5.9)
RBC #/AREA URNS AUTO: NORMAL /HPF
SODIUM SERPL-SCNC: 140 MMOL/L (ref 133–144)
SOURCE: ABNORMAL
SP GR UR STRIP: 1.02 (ref 1–1.03)
UROBILINOGEN UR STRIP-ACNC: 0.2 EU/DL (ref 0.2–1)
WBC # BLD AUTO: 8.2 10E9/L (ref 4–11)
WBC #/AREA URNS AUTO: NORMAL /HPF

## 2019-09-07 PROCEDURE — 85025 COMPLETE CBC W/AUTO DIFF WBC: CPT | Performed by: PHYSICIAN ASSISTANT

## 2019-09-07 PROCEDURE — 99214 OFFICE O/P EST MOD 30 MIN: CPT | Performed by: PHYSICIAN ASSISTANT

## 2019-09-07 PROCEDURE — 36415 COLL VENOUS BLD VENIPUNCTURE: CPT | Performed by: PHYSICIAN ASSISTANT

## 2019-09-07 PROCEDURE — 83690 ASSAY OF LIPASE: CPT | Performed by: PHYSICIAN ASSISTANT

## 2019-09-07 PROCEDURE — 80053 COMPREHEN METABOLIC PANEL: CPT | Performed by: PHYSICIAN ASSISTANT

## 2019-09-07 PROCEDURE — 81001 URINALYSIS AUTO W/SCOPE: CPT | Performed by: PHYSICIAN ASSISTANT

## 2019-09-07 RX ORDER — CIPROFLOXACIN 500 MG/1
500 TABLET, FILM COATED ORAL 2 TIMES DAILY
Qty: 20 TABLET | Refills: 0 | Status: SHIPPED | OUTPATIENT
Start: 2019-09-07 | End: 2019-09-17

## 2019-09-07 RX ORDER — METRONIDAZOLE 500 MG/1
500 TABLET ORAL 3 TIMES DAILY
Qty: 30 TABLET | Refills: 0 | Status: SHIPPED | OUTPATIENT
Start: 2019-09-07 | End: 2019-12-12

## 2019-09-07 ASSESSMENT — ENCOUNTER SYMPTOMS
DIARRHEA: 0
FOCAL WEAKNESS: 0
NAUSEA: 0
CHILLS: 0
VOMITING: 0
DYSURIA: 0
HEMATURIA: 0
ABDOMINAL PAIN: 1
FEVER: 0
SHORTNESS OF BREATH: 0
BLOOD IN STOOL: 0
FREQUENCY: 0
HEADACHES: 0
FLANK PAIN: 0
CONSTIPATION: 0

## 2019-09-07 NOTE — PROGRESS NOTES
HPI  September 7, 2019    HPI: Jeffrey Conteh is a 64 year old male who complains of moderate intermittent LLQ abdominal pain onset last night. Pain is described as stabbing, does not radiate, no known aggravating or alleviating factors. Pt has also been experiencing increased flatulence and bloating over the last couple of weeks. No treatments tried. Denies fever/chills, CP, SOB, constipation, hematochezia, melena, urinary sx, flank pain, N/V/D, rash, or any other symptoms.   Pt has hx diverticulitis in 2006 along with what was thought to be a perforation and had a laparotomy, where it was discovered that there was not a perforation. He had a high fever at that time. He wonders if this may be another flare of diverticulitis.    History reviewed. No pertinent past medical history.  Past Surgical History:   Procedure Laterality Date     APPENDECTOMY      2006 along with diverticulities      C TOTAL HIP ARTHROPLASTY      2005     SEPTOPLASTY       Social History     Tobacco Use     Smoking status: Never Smoker     Smokeless tobacco: Never Used   Substance Use Topics     Alcohol use: Yes     Alcohol/week: 0.0 oz     Comment: less than weekly      Drug use: No     Patient Active Problem List   Diagnosis     Sleep apnea     Diverticulitis of colon     Hyperlipidemia LDL goal <130     Neck arthritis     Rosacea     Right Achilles tendinitis     Family History   Problem Relation Age of Onset     Hypertension Father      Parkinsonism Father      Dementia Father      Cancer Father         Skin     Ovarian Cancer Mother      Cancer Maternal Grandmother         brain tumor      Cancer Maternal Uncle         brain tumor         Problem list, Medication list, Allergies, and Medical/Social/Surgical histories reviewed in [facility] and updated as appropriate.      Review of Systems   Constitutional: Negative for chills and fever.   Respiratory: Negative for shortness of breath.    Cardiovascular: Negative for chest pain.    Gastrointestinal: Positive for abdominal pain. Negative for blood in stool, constipation, diarrhea, nausea and vomiting.        Bloating, flatulence   Genitourinary: Negative for dysuria, flank pain, frequency, hematuria and urgency.   Skin: Negative for rash.   Neurological: Negative for focal weakness and headaches.   All other systems reviewed and are negative.        Physical Exam   Constitutional: He is oriented to person, place, and time.   HENT:   Head: Normocephalic and atraumatic.   Cardiovascular: Normal rate, regular rhythm and normal heart sounds.   Pulmonary/Chest: Effort normal and breath sounds normal.   Abdominal: Normal appearance and bowel sounds are normal. There is tenderness in the left lower quadrant. There is no rigidity, no rebound, no guarding and no CVA tenderness.   Musculoskeletal: Normal range of motion.   Neurological: He is alert and oriented to person, place, and time.   Skin: Skin is warm and dry.   Nursing note and vitals reviewed.    Vital Signs  BP (!) 141/85   Pulse 72   Temp 98.6  F (37  C) (Oral)   Wt 95.3 kg (210 lb)   SpO2 97%   BMI 28.01 kg/m       Diagnostic Test Results:  Results for orders placed or performed in visit on 09/07/19 (from the past 24 hour(s))   CBC with platelets differential   Result Value Ref Range    WBC 8.2 4.0 - 11.0 10e9/L    RBC Count 5.35 4.4 - 5.9 10e12/L    Hemoglobin 16.1 13.3 - 17.7 g/dL    Hematocrit 45.8 40.0 - 53.0 %    MCV 86 78 - 100 fl    MCH 30.1 26.5 - 33.0 pg    MCHC 35.2 31.5 - 36.5 g/dL    RDW 13.1 10.0 - 15.0 %    Platelet Count 284 150 - 450 10e9/L    % Neutrophils 66.9 %    % Lymphocytes 17.1 %    % Monocytes 12.5 %    % Eosinophils 3.0 %    % Basophils 0.5 %    Absolute Neutrophil 5.5 1.6 - 8.3 10e9/L    Absolute Lymphocytes 1.4 0.8 - 5.3 10e9/L    Absolute Monocytes 1.0 0.0 - 1.3 10e9/L    Absolute Eosinophils 0.3 0.0 - 0.7 10e9/L    Absolute Basophils 0.0 0.0 - 0.2 10e9/L    Diff Method Automated Method    Comprehensive  metabolic panel   Result Value Ref Range    Sodium 140 133 - 144 mmol/L    Potassium 3.8 3.4 - 5.3 mmol/L    Chloride 108 94 - 109 mmol/L    Carbon Dioxide 28 20 - 32 mmol/L    Anion Gap 4 3 - 14 mmol/L    Glucose 99 70 - 99 mg/dL    Urea Nitrogen 12 7 - 30 mg/dL    Creatinine 0.92 0.66 - 1.25 mg/dL    GFR Estimate 89 >60 mL/min/[1.73_m2]    GFR Estimate If Black >90 >60 mL/min/[1.73_m2]    Calcium 8.8 8.5 - 10.1 mg/dL    Bilirubin Total 0.5 0.2 - 1.3 mg/dL    Albumin 3.4 3.4 - 5.0 g/dL    Protein Total 7.3 6.8 - 8.8 g/dL    Alkaline Phosphatase 103 40 - 150 U/L    ALT 19 0 - 70 U/L    AST 11 0 - 45 U/L   Lipase   Result Value Ref Range    Lipase 88 73 - 393 U/L   *UA reflex to Microscopic and Culture ([place] [facility] (except [place] and [place])   Result Value Ref Range    Color Urine Yellow     Appearance Urine Clear     Glucose Urine Negative NEG^Negative mg/dL    Bilirubin Urine Negative NEG^Negative    Ketones Urine Negative NEG^Negative mg/dL    Specific Gravity Urine 1.025 1.003 - 1.035    Blood Urine Trace (A) NEG^Negative    pH Urine 5.5 5.0 - 7.0 pH    Protein Albumin Urine Negative NEG^Negative mg/dL    Urobilinogen Urine 0.2 0.2 - 1.0 EU/dL    Nitrite Urine Negative NEG^Negative    Leukocyte Esterase Urine Negative NEG^Negative    Source Midstream Urine    Urine Microscopic   Result Value Ref Range    WBC Urine 0 - 5 OTO5^0 - 5 /HPF    RBC Urine O - 2 OTO2^O - 2 /HPF       ASSESSMENT/PLAN      ICD-10-CM    1. LLQ abdominal pain R10.32 CBC with platelets differential     Comprehensive metabolic panel     Lipase     *UA reflex to Microscopic and Culture ([place] [facility] (except [place] and [place])     ciprofloxacin (CIPRO) 500 MG tablet     metroNIDAZOLE (FLAGYL) 500 MG tablet     Urine Microscopic      LLQ tenderness but no guarding or rigidity, afebrile, pt in NAD. Labs unremarkable. Suspect diverticulitis and will treat with Cipro & Flagyl. If develop fever or worsening  pain, go to ER. Recheck with PCP in 3-4 days.      I have discussed any lab or imaging results, the patient's diagnosis, and my plan of treatment with the patient and/or family. Patient is aware to come back in if with worsening symptoms or if no relief despite treatment plan.  Patient voiced understanding and had no further questions.       Follow Up: Return in about 3 days (around 9/10/2019) for Recheck with primary care provider.    ALEXANDRA Serrato, PA-C  [facility] URGENT [facility]ARE

## 2019-09-10 ENCOUNTER — OFFICE VISIT (OUTPATIENT)
Dept: FAMILY MEDICINE | Facility: CLINIC | Age: 64
End: 2019-09-10
Payer: COMMERCIAL

## 2019-09-10 VITALS
SYSTOLIC BLOOD PRESSURE: 144 MMHG | BODY MASS INDEX: 28.55 KG/M2 | DIASTOLIC BLOOD PRESSURE: 80 MMHG | OXYGEN SATURATION: 98 % | WEIGHT: 214 LBS | HEART RATE: 68 BPM | TEMPERATURE: 98.6 F

## 2019-09-10 DIAGNOSIS — R14.2 FLATULENCE, ERUCTATION AND GAS PAIN: ICD-10-CM

## 2019-09-10 DIAGNOSIS — R14.3 FLATULENCE, ERUCTATION AND GAS PAIN: ICD-10-CM

## 2019-09-10 DIAGNOSIS — R14.1 FLATULENCE, ERUCTATION AND GAS PAIN: ICD-10-CM

## 2019-09-10 DIAGNOSIS — R10.32 LLQ ABDOMINAL PAIN: Primary | ICD-10-CM

## 2019-09-10 PROCEDURE — 99214 OFFICE O/P EST MOD 30 MIN: CPT | Performed by: FAMILY MEDICINE

## 2019-09-14 ENCOUNTER — OFFICE VISIT (OUTPATIENT)
Dept: URGENT CARE | Facility: URGENT CARE | Age: 64
End: 2019-09-14
Payer: COMMERCIAL

## 2019-09-14 VITALS
SYSTOLIC BLOOD PRESSURE: 156 MMHG | OXYGEN SATURATION: 98 % | HEART RATE: 66 BPM | DIASTOLIC BLOOD PRESSURE: 90 MMHG | TEMPERATURE: 98 F | BODY MASS INDEX: 28.68 KG/M2 | WEIGHT: 215 LBS

## 2019-09-14 DIAGNOSIS — B37.0 THRUSH: Primary | ICD-10-CM

## 2019-09-14 PROCEDURE — 99213 OFFICE O/P EST LOW 20 MIN: CPT

## 2019-09-14 RX ORDER — NYSTATIN 100000/ML
500000 SUSPENSION, ORAL (FINAL DOSE FORM) ORAL 4 TIMES DAILY
Qty: 473 ML | Refills: 0 | Status: SHIPPED | OUTPATIENT
Start: 2019-09-14 | End: 2020-10-14

## 2019-09-14 NOTE — PROGRESS NOTES
Subjective     Jeffrey Conteh is a 64 year old male who presents to clinic today for the following health issues:    HPI   Presents with concern for thrush noted this AM on tongue.  Just finished week long Abx for diverticulitis.  Brushed white patch off this AM with toothbrush.  Also recently started new cleaning solution in sleep apnea device 2 days ago      Patient Active Problem List   Diagnosis     Sleep apnea     Diverticulitis of colon     Hyperlipidemia LDL goal <130     Neck arthritis     Rosacea     Right Achilles tendinitis     Past Surgical History:   Procedure Laterality Date     APPENDECTOMY      2006 along with diverticulities      C TOTAL HIP ARTHROPLASTY      2005     SEPTOPLASTY         Social History     Tobacco Use     Smoking status: Never Smoker     Smokeless tobacco: Never Used   Substance Use Topics     Alcohol use: Yes     Alcohol/week: 0.0 oz     Comment: less than weekly      Family History   Problem Relation Age of Onset     Hypertension Father      Parkinsonism Father      Dementia Father      Cancer Father         Skin     Ovarian Cancer Mother      Cancer Maternal Grandmother         brain tumor      Cancer Maternal Uncle         brain tumor          Current Outpatient Medications   Medication Sig Dispense Refill     calcium carbonate antacid (TUMS ULTRA 1000) 1000 MG CHEW Take 1 chew tab by mouth 2 times daily       Cholecalciferol (VITAMIN D) 1000 UNITS capsule Take 1 capsule by mouth daily       ciprofloxacin (CIPRO) 500 MG tablet Take 1 tablet (500 mg) by mouth 2 times daily for 10 days With Flagyl 20 tablet 0     Magnesium 500 MG CAPS        metroNIDAZOLE (FLAGYL) 500 MG tablet Take 1 tablet (500 mg) by mouth 3 times daily for 10 days With cipro or bactrim for diverticulitis 30 tablet 0     multivitamin, therapeutic with minerals (MULTI-VITAMIN) TABS tablet Take 1 tablet by mouth daily       Naproxen Sodium (ALEVE PO)        nystatin (MYCOSTATIN) 020710 UNIT/ML suspension Take 5  mLs (500,000 Units) by mouth 4 times daily 473 mL 0     No Known Allergies  Recent Labs   Lab Test 09/07/19  0845 05/15/18  1025 06/04/15  0930  12/17/13   LDL  --  116* 163*  --  131*   HDL  --  54 49  --  46   TRIG  --  70 89  --  69   ALT 19 29 34  --  24   CR 0.92 0.93 1.07  --  0.98   GFRESTIMATED 89 83 71  --  >60   GFRESTBLACK >90 >90 86  --  >60   POTASSIUM 3.8 3.9 3.9   < >  --     < > = values in this interval not displayed.      BP Readings from Last 3 Encounters:   09/14/19 (!) 156/90   09/10/19 (!) 144/80   09/07/19 (!) 141/85    Wt Readings from Last 3 Encounters:   09/14/19 97.5 kg (215 lb)   09/10/19 97.1 kg (214 lb)   09/07/19 95.3 kg (210 lb)                    Reviewed and updated as needed this visit by Provider         Review of Systems   ROS COMP: Constitutional, HEENT, cardiovascular, pulmonary, gi and gu systems are negative, except as otherwise noted.      Objective    BP (!) 156/90   Pulse 66   Temp 98  F (36.7  C) (Oral)   Wt 97.5 kg (215 lb)   SpO2 98%   BMI 28.68 kg/m    Body mass index is 28.68 kg/m .  Physical Exam   GENERAL: healthy, alert and no distress  EYES: Eyes grossly normal to inspection, PERRL and conjunctivae and sclerae normal  HENT: ear canals and TM's normal, nose and mouth without ulcers or lesions, no thrush noted  NECK: no adenopathy, no asymmetry, masses, or scars and thyroid normal to palpation  MS: no gross musculoskeletal defects noted, no edema  SKIN: no suspicious lesions or rashes  NEURO: Normal strength and tone, mentation intact and speech normal  PSYCH: mentation appears normal, affect normal/bright          Assessment & Plan     1. Thrush    - nystatin (MYCOSTATIN) 289171 UNIT/ML suspension; Take 5 mLs (500,000 Units) by mouth 4 times daily  Dispense: 473 mL; Refill: 0    Based on history and risk factors, we opted to treat with Nystatin swish and swallow if new patches recur.       Yu Jaimes MD  [facility] Urgent Care Provider  [facility]  URGENT CARE

## 2019-09-19 ENCOUNTER — OFFICE VISIT (OUTPATIENT)
Dept: FAMILY MEDICINE | Facility: CLINIC | Age: 64
End: 2019-09-19
Payer: COMMERCIAL

## 2019-09-19 VITALS
DIASTOLIC BLOOD PRESSURE: 86 MMHG | HEART RATE: 68 BPM | RESPIRATION RATE: 12 BRPM | WEIGHT: 211 LBS | TEMPERATURE: 97.5 F | BODY MASS INDEX: 27.96 KG/M2 | SYSTOLIC BLOOD PRESSURE: 138 MMHG | HEIGHT: 73 IN

## 2019-09-19 DIAGNOSIS — Z00.00 ROUTINE GENERAL MEDICAL EXAMINATION AT A HEALTH CARE FACILITY: Primary | ICD-10-CM

## 2019-09-19 DIAGNOSIS — Z23 NEED FOR VACCINATION: ICD-10-CM

## 2019-09-19 DIAGNOSIS — Z12.5 SCREENING FOR PROSTATE CANCER: ICD-10-CM

## 2019-09-19 DIAGNOSIS — E78.5 HYPERLIPIDEMIA LDL GOAL <130: ICD-10-CM

## 2019-09-19 DIAGNOSIS — Z23 NEED FOR PROPHYLACTIC VACCINATION AND INOCULATION AGAINST INFLUENZA: ICD-10-CM

## 2019-09-19 PROCEDURE — G0103 PSA SCREENING: HCPCS | Performed by: FAMILY MEDICINE

## 2019-09-19 PROCEDURE — 90472 IMMUNIZATION ADMIN EACH ADD: CPT | Performed by: FAMILY MEDICINE

## 2019-09-19 PROCEDURE — 99396 PREV VISIT EST AGE 40-64: CPT | Mod: 25 | Performed by: FAMILY MEDICINE

## 2019-09-19 PROCEDURE — 90471 IMMUNIZATION ADMIN: CPT | Performed by: FAMILY MEDICINE

## 2019-09-19 PROCEDURE — 36415 COLL VENOUS BLD VENIPUNCTURE: CPT | Performed by: FAMILY MEDICINE

## 2019-09-19 PROCEDURE — 80061 LIPID PANEL: CPT | Performed by: FAMILY MEDICINE

## 2019-09-19 PROCEDURE — 90714 TD VACC NO PRESV 7 YRS+ IM: CPT | Performed by: FAMILY MEDICINE

## 2019-09-19 PROCEDURE — 90682 RIV4 VACC RECOMBINANT DNA IM: CPT | Performed by: FAMILY MEDICINE

## 2019-09-19 ASSESSMENT — MIFFLIN-ST. JEOR: SCORE: 1810.97

## 2019-09-19 NOTE — PROGRESS NOTES
SUBJECTIVE:   CC: Jeffrey Conteh is an 64 year old male who presents for preventive health visit.     Healthy Habits:    Do you get at least three servings of calcium containing foods daily (dairy, green leafy vegetables, etc.)? yes    Amount of exercise or daily activities, outside of work: 3 day(s) per week    Problems taking medications regularly No    Medication side effects: No    Have you had an eye exam in the past two years? yes    Do you see a dentist twice per year? yes    Do you have sleep apnea, excessive snoring or daytime drowsiness?YES-bipap      Patient had history of diverticulitis on antibiotics recently finished noticed some thrush currently taking antifungal medication.  Denies any worsening abdominal pain overall is improved.  Denies any chest pain no shortness of breath.  He would like to have active lifestyle.  Trying to eat healthy.    Today's PHQ-2 Score:   PHQ-2 ( 1999 Pf[employer]) 9/19/2019 5/15/2018   Q1: Little interest or pleasure in doing things 0 0   Q2: Feeling down, depressed or hopeless 0 0   PHQ-2 Score 0 0       Abuse: Current or Past(Physical, Sexual or Emotional)- No  Do you feel safe in your environment? Yes    Social History     Tobacco Use     Smoking status: Never Smoker     Smokeless tobacco: Never Used   Substance Use Topics     Alcohol use: Yes     Alcohol/week: 0.0 oz     Comment: less than weekly      If you drink alcohol do you typically have >3 drinks per day or >7 drinks per week? No                      Last PSA:   PSA   Date Value Ref Range Status   05/15/2018 0.57 0 - 4 ug/L Final     Comment:     Assay Method:  Chemiluminescence using Siemens Vista analyzer       Reviewed orders with patient. Reviewed health maintenance and updated orders accordingly - Yes  Lab work is in process    Reviewed and updated as needed this visit by clinical staff  Tobacco  Allergies  Meds  Fam Hx  Soc Hx        Reviewed and updated as needed this visit by Provider       "      ROS:  CONSTITUTIONAL: NEGATIVE for fever, chills, change in weight  INTEGUMENTARY/SKIN: NEGATIVE for worrisome rashes, moles or lesions  EYES: NEGATIVE for vision changes or irritation  ENT: NEGATIVE for ear, mouth and throat problems  RESP: NEGATIVE for significant cough or SOB  CV: NEGATIVE for chest pain, palpitations or peripheral edema  GI: NEGATIVE for nausea, abdominal pain, heartburn, or change in bowel habits   male: negative for dysuria, hematuria, decreased urinary stream, erectile dysfunction, urethral discharge  MUSCULOSKELETAL: NEGATIVE for significant arthralgias or myalgia  NEURO: NEGATIVE for weakness, dizziness or paresthesias  PSYCHIATRIC: NEGATIVE for changes in mood or affect    OBJECTIVE:   /86   Pulse 68   Temp 97.5  F (36.4  C) (Tympanic)   Resp 12   Ht 1.854 m (6' 1\")   Wt 95.7 kg (211 lb)   BMI 27.84 kg/m    EXAM:  GENERAL: healthy, alert and no distress  EYES: Eyes grossly normal to inspection, PERRL and conjunctivae and sclerae normal  HENT: ear canals and TM's normal, nose and mouth without ulcers or lesions  NECK: no adenopathy, no asymmetry, masses, or scars and thyroid normal to palpation  RESP: lungs clear to auscultation - no rales, rhonchi or wheezes  CV: regular rate and rhythm, normal S1 S2, no S3 or S4, no murmur, click or rub, no peripheral edema and peripheral pulses strong  ABDOMEN: soft, nontender, no hepatosplenomegaly, no masses and bowel sounds normal  MS: no gross musculoskeletal defects noted, no edema  SKIN: no suspicious lesions or rashes  NEURO: Normal strength and tone, mentation intact and speech normal  PSYCH: mentation appears normal, affect normal/bright    Diagnostic Test Results:  Labs reviewed in Epic    ASSESSMENT/PLAN:   1. Routine general medical examination at a health care facility  Patient is overall healthy advised to regular exercise 30 to 40 minutes daily.  Eat healthy.  Watch for any symptoms of abdominal pain.  If any diarrhea " "which is persistent or worse he will let us know, at this time no new medications.  - INFLUENZA QUAD, RECOMBINANT, P-FREE (RIV4) (FLUBLOCK) [82504]  - Lipid panel reflex to direct LDL Fasting  - Prostate spec antigen screen    2. Need for vaccination      3. Need for prophylactic vaccination and inoculation against influenza    - INFLUENZA QUAD, RECOMBINANT, P-FREE (RIV4) (FLUBLOCK) [92010]    4. Hyperlipidemia LDL goal <130    - Lipid panel reflex to direct LDL Fasting    5. Screening for prostate cancer    - Prostate spec antigen screen    COUNSELING:  Reviewed preventive health counseling, as reflected in patient instructions       Regular exercise       Healthy diet/nutrition    Estimated body mass index is 27.84 kg/m  as calculated from the following:    Height as of this encounter: 1.854 m (6' 1\").    Weight as of this encounter: 95.7 kg (211 lb).         reports that he has never smoked. He has never used smokeless tobacco.      Counseling Resources:  ATP IV Guidelines  Pooled Cohorts Equation Calculator  FRAX Risk Assessment  ICSI Preventive Guidelines  Dietary Guidelines for Americans, 2010  Love[employer]'s MyPlate  ASA Prophylaxis  Lung CA Screening    Doyle Silva MD  [facility] [place]  "

## 2019-09-19 NOTE — LETTER
September 20, 2019      Jeffrey Conteh  [address]   [place] [address]        Dear ,    We are writing to inform you of your test results.      Lipid panel - LDL (bad cholesterol) is 113, at goal of < 130.  HDL (good cholesterol) is good at 44.  Make sure to eat a heart healthy diet rich in fruits, vegetables, whole grains, lean proteins, and healthy fats such as those found in fish, olive oil, and nuts, and get regular exercise (goal 30 minutes 5 days per week).     PSA (prostate cancer screening test) is normal.     Resulted Orders   Lipid panel reflex to direct LDL Fasting   Result Value Ref Range    Cholesterol 179 <200 mg/dL    Triglycerides 110 <150 mg/dL      Comment:      Fasting specimen    HDL Cholesterol 44 >39 mg/dL    LDL Cholesterol Calculated 113 (H) <100 mg/dL      Comment:      Above desirable:  100-129 mg/dl  Borderline High:  130-159 mg/dL  High:             160-189 mg/dL  Very high:       >189 mg/dl      Non HDL Cholesterol 135 (H) <130 mg/dL      Comment:      Above Desirable:  130-159 mg/dl  Borderline high:  160-189 mg/dl  High:             190-219 mg/dl  Very high:       >219 mg/dl     Prostate spec antigen screen   Result Value Ref Range    PSA 1.21 0 - 4 ug/L      Comment:      Assay Method:  Chemiluminescence using Siemens Vista analyzer       If you have any questions or concerns, please call the clinic at the number listed above.       Sincerely,        Mera Blair MD

## 2019-09-20 LAB
CHOLEST SERPL-MCNC: 179 MG/DL
HDLC SERPL-MCNC: 44 MG/DL
LDLC SERPL CALC-MCNC: 113 MG/DL
NONHDLC SERPL-MCNC: 135 MG/DL
PSA SERPL-ACNC: 1.21 UG/L (ref 0–4)
TRIGL SERPL-MCNC: 110 MG/DL

## 2019-12-12 ENCOUNTER — OFFICE VISIT (OUTPATIENT)
Dept: FAMILY MEDICINE | Facility: CLINIC | Age: 64
End: 2019-12-12
Payer: COMMERCIAL

## 2019-12-12 VITALS
DIASTOLIC BLOOD PRESSURE: 90 MMHG | SYSTOLIC BLOOD PRESSURE: 146 MMHG | OXYGEN SATURATION: 97 % | HEART RATE: 68 BPM | BODY MASS INDEX: 28.63 KG/M2 | WEIGHT: 217 LBS | TEMPERATURE: 96.4 F

## 2019-12-12 DIAGNOSIS — R09.82 PND (POST-NASAL DRIP): ICD-10-CM

## 2019-12-12 DIAGNOSIS — J01.90 ACUTE SINUSITIS WITH SYMPTOMS > 10 DAYS: Primary | ICD-10-CM

## 2019-12-12 DIAGNOSIS — R14.0 ABDOMINAL BLOATING: ICD-10-CM

## 2019-12-12 DIAGNOSIS — J01.90 ACUTE SINUSITIS WITH SYMPTOMS > 10 DAYS: ICD-10-CM

## 2019-12-12 PROCEDURE — 99214 OFFICE O/P EST MOD 30 MIN: CPT | Performed by: FAMILY MEDICINE

## 2019-12-12 RX ORDER — FLUTICASONE PROPIONATE 50 MCG
1 SPRAY, SUSPENSION (ML) NASAL DAILY
Qty: 11.1 ML | Refills: 0 | Status: SHIPPED | OUTPATIENT
Start: 2019-12-12 | End: 2019-12-12

## 2019-12-12 RX ORDER — FLUTICASONE PROPIONATE 50 MCG
SPRAY, SUSPENSION (ML) NASAL
Qty: 48 ML | Refills: 0 | Status: SHIPPED | OUTPATIENT
Start: 2019-12-12 | End: 2020-02-10

## 2019-12-12 NOTE — TELEPHONE ENCOUNTER
Patient wants 90 day supply. Prescription approved per [facility] [place].  Bella Pratt RN   [facility] - Triage

## 2019-12-12 NOTE — TELEPHONE ENCOUNTER
"Requested Prescriptions   Pending Prescriptions Disp Refills     fluticasone (FLONASE) 50 MCG/ACT nasal spray [Pharmacy Med Name:  Last Written Prescription Date:  12-  Last Fill Quantity: 11.1 ml,  # refills: 0   Last office visit: 12/12/2019 with prescribing provider:     Future Office Visit:     FLUTICASONE 50MCG NASAL SP (120) RX] 48 mL 0     Sig: SHAKE LIQUID AND USE 1 SPRAY IN EACH NOSTRIL DAILY       Inhaled Steroids Protocol Passed - 12/12/2019 11:43 AM        Passed - Patient is age 12 or older        Passed - Recent (12 mo) or future (30 days) visit within the authorizing provider's specialty     Patient has had an office visit with the authorizing provider or a provider within the authorizing providers department within the previous 12 mos or has a future within next 30 days. See \"Patient Info\" tab in inbasket, or \"Choose Columns\" in Meds & Orders section of the refill encounter.              Passed - Medication is active on med list          "

## 2019-12-12 NOTE — PROGRESS NOTES
Subjective     Jeffrey Conteh is a 64 year old male who presents to clinic today for the following health issues:    HPI   Gastrointestinal symptoms      Duration: 3-4 weeks, hx of diverticulitis     Description:           bloating and gas     Intensity:  mild    Accompanying signs and symptoms:  none    History  Previous  YES  Patient also complains of postnasal drainage and cough congestion symptoms.  This has been ongoing for few months but worse in the last 2 weeks.  He feels like he always has to clear his throat.  And that caused some drainage in the back of the throat.            Reviewed and updated as needed this visit by Provider         Review of Systems   ROS COMP: Constitutional, HEENT, cardiovascular, pulmonary, gi and gu systems are negative, except as otherwise noted.      Objective    BP (!) 154/94   Pulse 68   Temp 96.4  F (35.8  C) (Tympanic)   Wt 98.4 kg (217 lb)   SpO2 97%   BMI 28.63 kg/m    Body mass index is 28.63 kg/m .  Physical Exam   GENERAL: healthy, alert and no distress  NECK: no adenopathy, no asymmetry, masses, or scars and thyroid normal to palpation  RESP: lungs clear to auscultation - no rales, rhonchi or wheezes  Sinus tenderness and congestion noted.  CV: regular rate and rhythm, normal S1 S2, no S3 or S4, no murmur, click or rub, no peripheral edema and peripheral pulses strong  ABDOMEN: soft, nontender, no hepatosplenomegaly, no masses and bowel sounds normal            Assessment & Plan     1. Acute sinusitis with symptoms > 10 days   Augmentin and Flonase.  If symptoms persist other differential include acid reflux which may be causing some chronic irritation.  If symptoms do not improve suggested to try over-the-counter omeprazole to see if that helps.  - amoxicillin-clavulanate (AUGMENTIN) 875-125 MG tablet; Take 1 tablet by mouth 2 times daily for 7 days  Dispense: 14 tablet; Refill: 0  - fluticasone (FLONASE) 50 MCG/ACT nasal spray; Spray 1 spray into both nostrils  daily  Dispense: 11.1 mL; Refill: 0    2. PND (post-nasal drip)    - amoxicillin-clavulanate (AUGMENTIN) 875-125 MG tablet; Take 1 tablet by mouth 2 times daily for 7 days  Dispense: 14 tablet; Refill: 0  - fluticasone (FLONASE) 50 MCG/ACT nasal spray; Spray 1 spray into both nostrils daily  Dispense: 11.1 mL; Refill: 0    3. Abdominal bloating  Patient clinical exam is normal although he has history of diverticulitis.  Suggested continued observation however Augmentin which can be used for possible diverticulitis will help.  If any change in symptoms or any focal allergy advised to follow-up.           Doyle Silva MD  [facility] [place]

## 2020-01-24 ENCOUNTER — OFFICE VISIT (OUTPATIENT)
Dept: FAMILY MEDICINE | Facility: CLINIC | Age: 65
End: 2020-01-24
Payer: COMMERCIAL

## 2020-01-24 VITALS
OXYGEN SATURATION: 96 % | WEIGHT: 219 LBS | DIASTOLIC BLOOD PRESSURE: 80 MMHG | TEMPERATURE: 97.6 F | HEART RATE: 72 BPM | SYSTOLIC BLOOD PRESSURE: 140 MMHG | BODY MASS INDEX: 28.89 KG/M2

## 2020-01-24 DIAGNOSIS — J01.90 ACUTE SINUSITIS WITH SYMPTOMS > 10 DAYS: Primary | ICD-10-CM

## 2020-01-24 PROCEDURE — 99213 OFFICE O/P EST LOW 20 MIN: CPT | Performed by: FAMILY MEDICINE

## 2020-01-24 NOTE — PROGRESS NOTES
Subjective     Jeffrey Conteh is a 64 year old male who presents to clinic today for the following health issues:    HPI   Acute Illness   Acute illness concerns: cough   Onset: 10 days     Fever: no    Chills/Sweats: no    Headache (location?): no    Sinus Pressure:no    Conjunctivitis:  no    Ear Pain: no    Rhinorrhea: YES    Congestion: YES    Sore Throat: YES     Cough: YES-productive of clear sputum    Wheeze: no    Decreased Appetite: no    Nausea: no    Vomiting: no    Diarrhea:  no    Dysuria/Freq.: no    Fatigue/Achiness: YES    Sick/Strep Exposure: no     Therapies Tried and outcome: nyquil, cepacol, allegra, mucinex                Reviewed and updated as needed this visit by Provider         Review of Systems   ROS COMP: Constitutional, HEENT, cardiovascular, pulmonary, gi and gu systems are negative, except as otherwise noted.      Objective    There were no vitals taken for this visit.  There is no height or weight on file to calculate BMI.  Physical Exam   GENERAL: healthy, alert and no distress  NECK: no adenopathy, no asymmetry, masses, or scars and thyroid normal to palpation  RESP: lungs clear to auscultation - no rales, rhonchi or wheezes  CV: regular rate and rhythm, normal S1 S2, no S3 or S4, no murmur, click or rub, no peripheral edema and peripheral pulses strong  Sinus tenerness        Assessment & Plan     1. Acute sinusitis with symptoms > 10 days  Patient symptoms most likely upper respiratory sinusitis.  It has been ongoing for the last 7 to 10 days.  He is advised to use antibiotic which he has at home.  Saline nasal rinse.  If symptoms does not improve or getting worse he is advised to follow-up.           Doyle Silva MD  [facility] [place]

## 2020-02-17 ENCOUNTER — TRANSFERRED RECORDS (OUTPATIENT)
Dept: HEALTH INFORMATION MANAGEMENT | Facility: CLINIC | Age: 65
End: 2020-02-17

## 2020-09-05 DIAGNOSIS — R09.82 PND (POST-NASAL DRIP): ICD-10-CM

## 2020-09-05 DIAGNOSIS — J01.90 ACUTE SINUSITIS WITH SYMPTOMS > 10 DAYS: ICD-10-CM

## 2020-09-08 RX ORDER — FLUTICASONE PROPIONATE 50 MCG
SPRAY, SUSPENSION (ML) NASAL
Qty: 48 G | Refills: 1 | Status: SHIPPED | OUTPATIENT
Start: 2020-09-08 | End: 2020-10-27

## 2020-10-13 ENCOUNTER — OFFICE VISIT (OUTPATIENT)
Dept: FAMILY MEDICINE | Facility: CLINIC | Age: 65
End: 2020-10-13
Payer: COMMERCIAL

## 2020-10-13 ENCOUNTER — ANCILLARY PROCEDURE (OUTPATIENT)
Dept: GENERAL RADIOLOGY | Facility: CLINIC | Age: 65
End: 2020-10-13
Attending: FAMILY MEDICINE
Payer: COMMERCIAL

## 2020-10-13 VITALS
OXYGEN SATURATION: 98 % | DIASTOLIC BLOOD PRESSURE: 86 MMHG | TEMPERATURE: 97.5 F | SYSTOLIC BLOOD PRESSURE: 134 MMHG | RESPIRATION RATE: 16 BRPM | HEART RATE: 78 BPM | WEIGHT: 216 LBS | BODY MASS INDEX: 28.5 KG/M2

## 2020-10-13 DIAGNOSIS — R14.0 ABDOMINAL BLOATING: Primary | ICD-10-CM

## 2020-10-13 DIAGNOSIS — K59.00 CONSTIPATION, UNSPECIFIED CONSTIPATION TYPE: ICD-10-CM

## 2020-10-13 DIAGNOSIS — Z23 NEED FOR PROPHYLACTIC VACCINATION AND INOCULATION AGAINST INFLUENZA: ICD-10-CM

## 2020-10-13 DIAGNOSIS — R10.84 ABDOMINAL PAIN, GENERALIZED: ICD-10-CM

## 2020-10-13 LAB
ERYTHROCYTE [DISTWIDTH] IN BLOOD BY AUTOMATED COUNT: 12.6 % (ref 10–15)
HCT VFR BLD AUTO: 43.1 % (ref 40–53)
HGB BLD-MCNC: 15.1 G/DL (ref 13.3–17.7)
MCH RBC QN AUTO: 30.2 PG (ref 26.5–33)
MCHC RBC AUTO-ENTMCNC: 35 G/DL (ref 31.5–36.5)
MCV RBC AUTO: 86 FL (ref 78–100)
PLATELET # BLD AUTO: 310 10E9/L (ref 150–450)
RBC # BLD AUTO: 5 10E12/L (ref 4.4–5.9)
WBC # BLD AUTO: 8.1 10E9/L (ref 4–11)

## 2020-10-13 PROCEDURE — 99214 OFFICE O/P EST MOD 30 MIN: CPT | Mod: 25 | Performed by: FAMILY MEDICINE

## 2020-10-13 PROCEDURE — 85027 COMPLETE CBC AUTOMATED: CPT | Performed by: FAMILY MEDICINE

## 2020-10-13 PROCEDURE — 90471 IMMUNIZATION ADMIN: CPT | Performed by: FAMILY MEDICINE

## 2020-10-13 PROCEDURE — 74019 RADEX ABDOMEN 2 VIEWS: CPT | Mod: FY | Performed by: RADIOLOGY

## 2020-10-13 PROCEDURE — 80053 COMPREHEN METABOLIC PANEL: CPT | Performed by: FAMILY MEDICINE

## 2020-10-13 PROCEDURE — 36415 COLL VENOUS BLD VENIPUNCTURE: CPT | Performed by: FAMILY MEDICINE

## 2020-10-13 PROCEDURE — 90682 RIV4 VACC RECOMBINANT DNA IM: CPT | Performed by: FAMILY MEDICINE

## 2020-10-13 NOTE — PROGRESS NOTES
Subjective     Jeffrey Conteh is a 65 year old male who presents to clinic today for the following health issues:    HPI         Abdominal/Flank Pain  Onset/Duration: 1 week  Description:   Character: Fullness  Location: middle of abdomen-above belly button  Radiation: None  Intensity: mild  Progression of Symptoms:  same and constant  Accompanying Signs & Symptoms:  Fever/Chills: YES now better  Gas/Bloating: YES  Nausea: no  Vomitting: no  Diarrhea: no  Constipation: no  Dysuria or Hematuria: no  History:   Trauma: no  Previous similar pain: YES  Previous tests done: none  Precipitating factors:   Does the pain change with:     Food: no    Bowel Movement: unsure    Urination: no   Other factors:  no  Therapies tried and outcome: None      Patient started with nonspecific symptoms of slight fatigue and then he had slight nausea.  Abdominal bloating and feel like he is not having good bowel movement.  He started using MiraLAX which has helped now his bloating has improved.  Appetite is coming back.  He had COVID testing done which was negative.  No focal tenderness.  Denies any testicular pain.  No headaches no fever currently.    Review of Systems   Constitutional, HEENT, cardiovascular, pulmonary, gi and gu systems are negative, except as otherwise noted.      Objective    There were no vitals taken for this visit.  There is no height or weight on file to calculate BMI.  Physical Exam   GENERAL: healthy, alert and no distress  NECK: no adenopathy, no asymmetry, masses, or scars and thyroid normal to palpation  RESP: lungs clear to auscultation - no rales, rhonchi or wheezes  CV: regular rate and rhythm, normal S1 S2, no S3 or S4, no murmur, click or rub, no peripheral edema and peripheral pulses strong  ABDOMEN: soft, nontender, no hepatosplenomegaly, no masses and bowel sounds normal            Assessment & Plan     Abdominal bloating    - XR Abdomen 2 Views; Future  - CBC with platelets  - Comprehensive  metabolic panel  - XR Abdomen 2 Views    Need for prophylactic vaccination and inoculation against influenza    - INFLUENZA QUAD, RECOMBINANT, P-FREE (RIV4) (FLUBLOCK) [75223]    Constipation, unspecified constipation type   x-ray reviewed.  His bowel sounds are slight hyperactive currently.  Increase amount of stool present throughout the colon.  He is advised to continue MiraLAX.  Increase fiber in the diet.  Watch your diet and see if any change in symptoms.  If any worsening or any focal tenderness or any fever he will let us know.  Warning signs were discussed with the patient.              Return in about 1 week (around 10/20/2020) for if symptom does not get better.    Doyle Silva MD  [facility]

## 2020-10-14 ENCOUNTER — HOSPITAL ENCOUNTER (OUTPATIENT)
Facility: CLINIC | Age: 65
Setting detail: OBSERVATION
Discharge: HOME | End: 2020-10-15
Attending: PHYSICIAN ASSISTANT | Admitting: INTERNAL MEDICINE
Payer: COMMERCIAL

## 2020-10-14 ENCOUNTER — TELEPHONE (OUTPATIENT)
Dept: FAMILY MEDICINE | Facility: CLINIC | Age: 65
End: 2020-10-14

## 2020-10-14 ENCOUNTER — HOSPITAL ENCOUNTER (OUTPATIENT)
Dept: CT IMAGING | Facility: CLINIC | Age: 65
Discharge: HOME | End: 2020-10-14
Attending: FAMILY MEDICINE | Admitting: FAMILY MEDICINE
Payer: COMMERCIAL

## 2020-10-14 DIAGNOSIS — N17.9 ACUTE KIDNEY INJURY (H): ICD-10-CM

## 2020-10-14 DIAGNOSIS — N20.1 RIGHT URETERAL STONE: ICD-10-CM

## 2020-10-14 DIAGNOSIS — R10.84 ABDOMINAL PAIN, GENERALIZED: ICD-10-CM

## 2020-10-14 DIAGNOSIS — R14.0 ABDOMINAL BLOATING: ICD-10-CM

## 2020-10-14 LAB
ALBUMIN SERPL-MCNC: 3.5 G/DL (ref 3.4–5)
ALBUMIN UR-MCNC: 10 MG/DL
ALP SERPL-CCNC: 80 U/L (ref 40–150)
ALT SERPL W P-5'-P-CCNC: 25 U/L (ref 0–70)
ANION GAP SERPL CALCULATED.3IONS-SCNC: 3 MMOL/L (ref 3–14)
ANION GAP SERPL CALCULATED.3IONS-SCNC: 4 MMOL/L (ref 3–14)
APPEARANCE UR: CLEAR
AST SERPL W P-5'-P-CCNC: 15 U/L (ref 0–45)
BASOPHILS # BLD AUTO: 0 10E9/L (ref 0–0.2)
BASOPHILS NFR BLD AUTO: 0.4 %
BILIRUB SERPL-MCNC: 0.5 MG/DL (ref 0.2–1.3)
BILIRUB UR QL STRIP: NEGATIVE
BUN SERPL-MCNC: 18 MG/DL (ref 7–30)
BUN SERPL-MCNC: 19 MG/DL (ref 7–30)
CALCIUM SERPL-MCNC: 8.6 MG/DL (ref 8.5–10.1)
CALCIUM SERPL-MCNC: 9.1 MG/DL (ref 8.5–10.1)
CHLORIDE SERPL-SCNC: 109 MMOL/L (ref 94–109)
CHLORIDE SERPL-SCNC: 110 MMOL/L (ref 94–109)
CO2 SERPL-SCNC: 25 MMOL/L (ref 20–32)
CO2 SERPL-SCNC: 28 MMOL/L (ref 20–32)
COLOR UR AUTO: YELLOW
CREAT SERPL-MCNC: 1.62 MG/DL (ref 0.66–1.25)
CREAT SERPL-MCNC: 1.63 MG/DL (ref 0.66–1.25)
DIFFERENTIAL METHOD BLD: NORMAL
EOSINOPHIL # BLD AUTO: 0.2 10E9/L (ref 0–0.7)
EOSINOPHIL NFR BLD AUTO: 1.8 %
ERYTHROCYTE [DISTWIDTH] IN BLOOD BY AUTOMATED COUNT: 12.2 % (ref 10–15)
GFR SERPL CREATININE-BSD FRML MDRD: 44 ML/MIN/{1.73_M2}
GFR SERPL CREATININE-BSD FRML MDRD: 44 ML/MIN/{1.73_M2}
GLUCOSE SERPL-MCNC: 100 MG/DL (ref 70–99)
GLUCOSE SERPL-MCNC: 93 MG/DL (ref 70–99)
GLUCOSE UR STRIP-MCNC: NEGATIVE MG/DL
HCT VFR BLD AUTO: 45.1 % (ref 40–53)
HGB BLD-MCNC: 15.8 G/DL (ref 13.3–17.7)
HGB UR QL STRIP: ABNORMAL
IMM GRANULOCYTES # BLD: 0.1 10E9/L (ref 0–0.4)
IMM GRANULOCYTES NFR BLD: 0.7 %
KETONES UR STRIP-MCNC: NEGATIVE MG/DL
LABORATORY COMMENT REPORT: NORMAL
LEUKOCYTE ESTERASE UR QL STRIP: NEGATIVE
LYMPHOCYTES # BLD AUTO: 1.5 10E9/L (ref 0.8–5.3)
LYMPHOCYTES NFR BLD AUTO: 18 %
MCH RBC QN AUTO: 30.3 PG (ref 26.5–33)
MCHC RBC AUTO-ENTMCNC: 35 G/DL (ref 31.5–36.5)
MCV RBC AUTO: 86 FL (ref 78–100)
MONOCYTES # BLD AUTO: 0.6 10E9/L (ref 0–1.3)
MONOCYTES NFR BLD AUTO: 7.7 %
NEUTROPHILS # BLD AUTO: 5.8 10E9/L (ref 1.6–8.3)
NEUTROPHILS NFR BLD AUTO: 71.4 %
NITRATE UR QL: NEGATIVE
NRBC # BLD AUTO: 0 10*3/UL
NRBC BLD AUTO-RTO: 0 /100
PH UR STRIP: 5.5 PH (ref 5–7)
PLATELET # BLD AUTO: 341 10E9/L (ref 150–450)
POTASSIUM SERPL-SCNC: 3.8 MMOL/L (ref 3.4–5.3)
POTASSIUM SERPL-SCNC: 4.2 MMOL/L (ref 3.4–5.3)
PROT SERPL-MCNC: 6.9 G/DL (ref 6.8–8.8)
RBC # BLD AUTO: 5.22 10E12/L (ref 4.4–5.9)
RBC #/AREA URNS AUTO: 15 /HPF (ref 0–2)
SARS-COV-2 RNA SPEC QL NAA+PROBE: NEGATIVE
SARS-COV-2 RNA SPEC QL NAA+PROBE: NORMAL
SODIUM SERPL-SCNC: 139 MMOL/L (ref 133–144)
SODIUM SERPL-SCNC: 140 MMOL/L (ref 133–144)
SOURCE: ABNORMAL
SP GR UR STRIP: 1.02 (ref 1–1.03)
SPECIMEN SOURCE: NORMAL
SPECIMEN SOURCE: NORMAL
UROBILINOGEN UR STRIP-MCNC: NORMAL MG/DL (ref 0–2)
WBC # BLD AUTO: 8.1 10E9/L (ref 4–11)
WBC #/AREA URNS AUTO: <1 /HPF (ref 0–5)

## 2020-10-14 PROCEDURE — 74176 CT ABD & PELVIS W/O CONTRAST: CPT

## 2020-10-14 PROCEDURE — 96360 HYDRATION IV INFUSION INIT: CPT

## 2020-10-14 PROCEDURE — G0378 HOSPITAL OBSERVATION PER HR: HCPCS

## 2020-10-14 PROCEDURE — 99219 PR INITIAL OBSERVATION CARE,LEVEL II: CPT | Mod: AI | Performed by: INTERNAL MEDICINE

## 2020-10-14 PROCEDURE — 258N000003 HC RX IP 258 OP 636: Performed by: PHYSICIAN ASSISTANT

## 2020-10-14 PROCEDURE — 80048 BASIC METABOLIC PNL TOTAL CA: CPT | Performed by: PHYSICIAN ASSISTANT

## 2020-10-14 PROCEDURE — 99285 EMERGENCY DEPT VISIT HI MDM: CPT | Mod: 25

## 2020-10-14 PROCEDURE — 99207 PR CDG-CODE CATEGORY CHANGED: CPT | Performed by: INTERNAL MEDICINE

## 2020-10-14 PROCEDURE — 250N000011 HC RX IP 250 OP 636: Performed by: INTERNAL MEDICINE

## 2020-10-14 PROCEDURE — 250N000013 HC RX MED GY IP 250 OP 250 PS 637: Performed by: INTERNAL MEDICINE

## 2020-10-14 PROCEDURE — 85025 COMPLETE CBC W/AUTO DIFF WBC: CPT | Performed by: PHYSICIAN ASSISTANT

## 2020-10-14 PROCEDURE — 81001 URINALYSIS AUTO W/SCOPE: CPT | Performed by: PHYSICIAN ASSISTANT

## 2020-10-14 PROCEDURE — U0003 INFECTIOUS AGENT DETECTION BY NUCLEIC ACID (DNA OR RNA); SEVERE ACUTE RESPIRATORY SYNDROME CORONAVIRUS 2 (SARS-COV-2) (CORONAVIRUS DISEASE [COVID-19]), AMPLIFIED PROBE TECHNIQUE, MAKING USE OF HIGH THROUGHPUT TECHNOLOGIES AS DESCRIBED BY CMS-2020-01-R: HCPCS | Performed by: PHYSICIAN ASSISTANT

## 2020-10-14 PROCEDURE — C9803 HOPD COVID-19 SPEC COLLECT: HCPCS

## 2020-10-14 PROCEDURE — 96361 HYDRATE IV INFUSION ADD-ON: CPT

## 2020-10-14 RX ORDER — FLUTICASONE PROPIONATE 50 MCG
1 SPRAY, SUSPENSION (ML) NASAL DAILY PRN
Status: DISCONTINUED | OUTPATIENT
Start: 2020-10-14 | End: 2020-10-15 | Stop reason: HOSPADM

## 2020-10-14 RX ORDER — AMOXICILLIN 250 MG
1 CAPSULE ORAL 2 TIMES DAILY PRN
Status: DISCONTINUED | OUTPATIENT
Start: 2020-10-14 | End: 2020-10-15 | Stop reason: HOSPADM

## 2020-10-14 RX ORDER — ONDANSETRON 2 MG/ML
4 INJECTION INTRAMUSCULAR; INTRAVENOUS EVERY 6 HOURS PRN
Status: DISCONTINUED | OUTPATIENT
Start: 2020-10-14 | End: 2020-10-15 | Stop reason: HOSPADM

## 2020-10-14 RX ORDER — AMOXICILLIN 250 MG
2 CAPSULE ORAL 2 TIMES DAILY PRN
Status: DISCONTINUED | OUTPATIENT
Start: 2020-10-14 | End: 2020-10-15 | Stop reason: HOSPADM

## 2020-10-14 RX ORDER — DOXAZOSIN 1 MG/1
1 TABLET ORAL DAILY
Status: DISCONTINUED | OUTPATIENT
Start: 2020-10-14 | End: 2020-10-15 | Stop reason: HOSPADM

## 2020-10-14 RX ORDER — ONDANSETRON 4 MG/1
4 TABLET, ORALLY DISINTEGRATING ORAL EVERY 6 HOURS PRN
Status: DISCONTINUED | OUTPATIENT
Start: 2020-10-14 | End: 2020-10-15 | Stop reason: HOSPADM

## 2020-10-14 RX ORDER — POLYETHYLENE GLYCOL 3350 17 G/17G
17 POWDER, FOR SOLUTION ORAL 2 TIMES DAILY PRN
Status: DISCONTINUED | OUTPATIENT
Start: 2020-10-14 | End: 2020-10-15 | Stop reason: HOSPADM

## 2020-10-14 RX ORDER — SODIUM CHLORIDE 9 MG/ML
INJECTION, SOLUTION INTRAVENOUS CONTINUOUS
Status: DISCONTINUED | OUTPATIENT
Start: 2020-10-14 | End: 2020-10-14

## 2020-10-14 RX ORDER — DOCUSATE SODIUM 100 MG/1
100 CAPSULE, LIQUID FILLED ORAL 2 TIMES DAILY
Status: DISCONTINUED | OUTPATIENT
Start: 2020-10-14 | End: 2020-10-15 | Stop reason: HOSPADM

## 2020-10-14 RX ORDER — OXYCODONE HYDROCHLORIDE 5 MG/1
5-10 TABLET ORAL
Status: DISCONTINUED | OUTPATIENT
Start: 2020-10-14 | End: 2020-10-15 | Stop reason: HOSPADM

## 2020-10-14 RX ORDER — POLYETHYLENE GLYCOL 3350 17 G/17G
1 POWDER, FOR SOLUTION ORAL EVERY MORNING
COMMUNITY
End: 2021-03-23

## 2020-10-14 RX ORDER — MAGNESIUM CARB/ALUMINUM HYDROX 105-160MG
148 TABLET,CHEWABLE ORAL
Status: DISCONTINUED | OUTPATIENT
Start: 2020-10-14 | End: 2020-10-15 | Stop reason: HOSPADM

## 2020-10-14 RX ORDER — NALOXONE HYDROCHLORIDE 0.4 MG/ML
.1-.4 INJECTION, SOLUTION INTRAMUSCULAR; INTRAVENOUS; SUBCUTANEOUS
Status: DISCONTINUED | OUTPATIENT
Start: 2020-10-14 | End: 2020-10-15 | Stop reason: HOSPADM

## 2020-10-14 RX ORDER — NAPROXEN 500 MG/1
500 TABLET ORAL 2 TIMES DAILY PRN
COMMUNITY

## 2020-10-14 RX ORDER — ACETAMINOPHEN 650 MG/1
650 SUPPOSITORY RECTAL EVERY 4 HOURS PRN
Status: DISCONTINUED | OUTPATIENT
Start: 2020-10-14 | End: 2020-10-15 | Stop reason: HOSPADM

## 2020-10-14 RX ORDER — SODIUM CHLORIDE AND POTASSIUM CHLORIDE 150; 900 MG/100ML; MG/100ML
INJECTION, SOLUTION INTRAVENOUS CONTINUOUS
Status: DISCONTINUED | OUTPATIENT
Start: 2020-10-14 | End: 2020-10-15 | Stop reason: HOSPADM

## 2020-10-14 RX ORDER — ACETAMINOPHEN 325 MG/1
650 TABLET ORAL EVERY 4 HOURS PRN
Status: DISCONTINUED | OUTPATIENT
Start: 2020-10-14 | End: 2020-10-15 | Stop reason: HOSPADM

## 2020-10-14 RX ORDER — HYDRALAZINE HYDROCHLORIDE 10 MG/1
10 TABLET, FILM COATED ORAL 4 TIMES DAILY PRN
Status: DISCONTINUED | OUTPATIENT
Start: 2020-10-14 | End: 2020-10-15 | Stop reason: HOSPADM

## 2020-10-14 RX ADMIN — SODIUM CHLORIDE 1000 ML: 9 INJECTION, SOLUTION INTRAVENOUS at 17:25

## 2020-10-14 RX ADMIN — DOXAZOSIN 1 MG: 1 TABLET ORAL at 21:34

## 2020-10-14 RX ADMIN — POTASSIUM CHLORIDE AND SODIUM CHLORIDE: 900; 150 INJECTION, SOLUTION INTRAVENOUS at 22:57

## 2020-10-14 RX ADMIN — DOCUSATE SODIUM 100 MG: 100 CAPSULE, LIQUID FILLED ORAL at 21:34

## 2020-10-14 ASSESSMENT — ENCOUNTER SYMPTOMS
ABDOMINAL DISTENTION: 1
NAUSEA: 1
FLANK PAIN: 1
ABDOMINAL PAIN: 1

## 2020-10-14 NOTE — ED TRIAGE NOTES
Pt was in with PMD today and had scan for abdominal bloating and discomfort and found to have kidney stones on the right. Rates pain 0/10 currently.

## 2020-10-14 NOTE — PROGRESS NOTES
RECEIVING UNIT ED HANDOFF REVIEW    ED Nurse Handoff Report was reviewed by: Geovanna Boudreaux RN on October 14, 2020 at 6:55 PM

## 2020-10-14 NOTE — ED NOTES
[facility]  ED Nurse Handoff Report    ED Chief complaint: Abdominal Pain (Bloating and discomfort, kidney stones found on scan)      ED Diagnosis:   Final diagnoses:   Acute kidney injury (H)   Right ureteral stone       Code Status: to be discussed with hospitalist     Allergies: No Known Allergies    Patient Story: pt has known  Kidney stones and was refferred here by PMD.   Focused Assessment:  Alert calm orientated     Treatments and/or interventions provided: iv fluids admission   Patient's response to treatments and/or interventions: no changes     To be done/followed up on inpatient unit:  pt has own bipap he is bringing , monitor pain     Does this patient have any cognitive concerns?: none    Activity level - Baseline/Home:  Independent  Activity Level - Current:   Independent    Patient's Preferred language: English   Needed?: No    Isolation: None  Infection: Not Applicable  none  Bariatric?: No    Vital Signs:   Vitals:    10/14/20 1631 10/14/20 1700 10/14/20 1730 10/14/20 1800   BP: (!) 161/100 (!) 156/103     Pulse: 83 80 79 71   Resp:       Temp:       TempSrc:       SpO2: 96% 95% 96% 98%   Height:           Cardiac Rhythm:     Was the PSS-3 completed:   Yes  What interventions are required if any?  Not needed              Family Comments: son was present and updated   OBS brochure/video discussed/provided to patient/family: N/A              Name of person given brochure if not patient: na              Relationship to patient: na    For the majority of the shift this patient's behavior was Green.   Behavioral interventions performed were not needed .    ED NURSE PHONE NUMBER: [phone]

## 2020-10-14 NOTE — TELEPHONE ENCOUNTER
Patient returned call, RN made provider aware, provider is speaking with patient.    MELITON GaviriaN, RN  [employer] [facility]

## 2020-10-14 NOTE — TELEPHONE ENCOUNTER
Routing to provider    Patient returned call to provider, provider unavailable.  Patient can be reached at: [phone]    MELITON GaviriaN, RN  [employer] [facility]

## 2020-10-14 NOTE — H&P
[facility]    History and Physical - Hospitalist Service       Date of Admission:  10/14/2020    Assessment & Plan   Jeffrey Conteh is a 65 year old male with h/o diverticulitis who presents with clinic with a recent CT scan showing a 10 mm obstructing calculus at the right ureteropelvic junction with moderate right hydronephrosis.  An elevated creatinine of 1.6 (baseline 0.9).    Right obstructing nephrolithiasis  Hydronephrosis, R with CHUCK  - Admit under observation.   - IVF with NS + 20 KCl ordered at 100 ml/h  - BMP in the AM  - NPO after midnight. Urology consulted for stone procedure.   - Having minimal pain: tylenol, oxycodone PRN ordered. No NSAIDS.     HTN noted and patient denies significant pain. Asymptomatic.   - Started on doxazosin for high blood pressure and aid in passage of stone.   - Hydralazine PO 10 mg ordered PRN for BP > 180/110.     Constipation  - continue senna BID   - [place] lax, dulcolax and mg citrate ordered PRN.      Diet:   Regular, NPO after midnight.   DVT Prophylaxis: Low Risk/Ambulatory with no VTE prophylaxis indicated  Garcia Catheter: not present  Code Status:   Full          Disposition Plan   Expected discharge: Tomorrow, recommended to prior living arrangement once seen by urology and has undergone procedure. .  Entered: Molly Anderson MD 10/14/2020, 5:37 PM     The patient's care was discussed with the Patient and Patient's Family.    Molly Anderson MD  [facility]  Contact information available via [facility] Paging/Directory      ______________________________________________________________________    Chief Complaint   Sent from clinic with known kidney stone and elevated Cr.     History of Present Illness   Jeffrey Conteh is a 65 year old male with h/o diverticulitis who presents with clinic with a recent CT scan showing a 10 mm obstructing calculus at the right ureteropelvic junction with moderate right hydronephrosis.   An elevated creatinine of 1.6 (baseline 0.9).  He states that approximately a week ago he had an episode of flank pain and then developed fevers with some mild epigastric pain.  He thought he had COVID this was negative.  He denies any other symptoms of COVID such as shortness of breath or cough or loss of taste or smell or diarrhea.  He is actually been constipated.  The fever resolved but he continued to have some mild epigastric pain. He had taken MiraLAX for the constipation, but continued to feel backed up.  He therefore went to his clinic doctor today.  He was afebrile hemodynamically stable, and CT scan showed stone and hydronephrosis as above with elevated creatinine.  His clinic doctor therefore recommended that he present to the ER.  Nephrology Dr. Madrid was consulted and recommended admission with probable procedure tomorrow.    Patient states except for mild abdominal pain he is feeling well.  Currently he has no fevers no flank pain no urinary symptoms no blood in the urine.  Although Jony is on his med list.  He denies taking any recent NSAIDs.  He only has been using Tylenol.     Review of Systems    The 10 point Review of Systems is negative other than noted in the HPI.     Past Medical History    H/o diverticulitis  BRITTA     Past Surgical History   I have reviewed this patient's surgical history and updated it with pertinent information if needed.  Past Surgical History:   Procedure Laterality Date     APPENDECTOMY      2006 along with diverticulities      C TOTAL HIP ARTHROPLASTY      2005     SEPTOPLASTY         Social History   I have reviewed this patient's social history and updated it with pertinent information if needed.  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   Social History     Tobacco Use     Smoking status: Never Smoker     Smokeless tobacco: Never Used   Substance Use Topics      Alcohol use: Yes     Alcohol/week: 0.0 standard drinks     Comment: less than weekly      Drug use: No       Family History    No known history of kidney stones in the family  I have reviewed this patient's family history and updated it with pertinent information if needed.  Family History   Problem Relation Age of Onset     Hypertension Father      Parkinsonism Father      Dementia Father      Cancer Father         Skin     Ovarian Cancer Mother      Cancer Maternal Grandmother         brain tumor      Cancer Maternal Uncle         brain tumor        Prior to Admission Medications   Prior to Admission Medications   Prescriptions Last Dose Informant Patient Reported? Taking?   Cholecalciferol (VITAMIN D) 1000 UNITS capsule   Yes No   Sig: Take 1 capsule by mouth daily   Magnesium 500 MG CAPS   Yes No   Naproxen Sodium (ALEVE PO)   Yes No   calcium carbonate antacid (TUMS ULTRA 1000) 1000 MG CHEW   Yes No   Sig: Take 1 chew tab by mouth 2 times daily   fluticasone (FLONASE) 50 MCG/ACT nasal spray   No No   Sig: SHAKE LIQUID AND USE 1 SPRAY IN EACH NOSTRIL DAILY   multivitamin, therapeutic with minerals (MULTI-VITAMIN) TABS tablet   Yes No   Sig: Take 1 tablet by mouth daily   nystatin (MYCOSTATIN) 130069 UNIT/ML suspension   No No   Sig: Take 5 mLs (500,000 Units) by mouth 4 times daily   Patient not taking: Reported on 1/24/2020      Facility-Administered Medications: None     Allergies   No Known Allergies    Physical Exam   Vital Signs: Temp: 98.6  F (37  C) Temp src: Oral BP: (!) 161/100 Pulse: 83   Resp: 18 SpO2: 96 % O2 Device: None (Room air)    Weight: 0 lbs 0 oz    Constitutional:   awake, alert, cooperative, no apparent distress, and appears stated age     Eyes:   Lids and lashes normal, pupils equal, round and reactive to light, extra ocular muscles intact, sclera clear, conjunctiva normal     ENT:   Normocephalic, without obvious abnormality, atramatic, wearing mask.      Neck:   Supple, symmetrical,  trachea midline, no adenopathy, thyroid symmetric, not enlarged and no tenderness, skin normal     Lungs:   No increased work of breathing, good air exchange, clear to auscultation bilaterally, no crackles or wheezing     Cardiovascular:   Regular rate and rhythm, normal S1 and S2, no S3 or S4, and no murmur noted. Extremities are warm. No edema.      Abdomen:   Normal bowel sounds, soft, non-distended, non-tender, no masses palpated, no hepatosplenomegally     Musculoskeletal:   There is no redness, warmth, or swelling of the joints. Normal build.      Neurologic:   Awake, alert, oriented to name, place and time.  Cranial nerves II-XII are grossly intact.  Moving a 4 extremities without gross focal weakness.     Neuropsychiatric:   General: normal, calm and normal eye contact     Skin:   no bruising or bleeding, no redness, warmth, or swelling and no rashes         Data   Data reviewed today: I reviewed all medications, new labs and imaging results over the last 24 hours. I personally reviewed no images or EKG's today.    Recent Labs   Lab 10/14/20  1609 10/13/20  1047   WBC 8.1 8.1   HGB 15.8 15.1   MCV 86 86    310    139   POTASSIUM 3.8 4.2   CHLORIDE 109 110*   CO2 28 25   BUN 19 18   CR 1.63* 1.62*   ANIONGAP 3 4   MICA 9.1 8.6   * 93   ALBUMIN  --  3.5   PROTTOTAL  --  6.9   BILITOTAL  --  0.5   ALKPHOS  --  80   ALT  --  25   AST  --  15     Recent Results (from the past 24 hour(s))   CT Abdomen Pelvis w/o Contrast    Narrative    CT ABDOMEN PELVIS W/O CONTRAST 10/14/2020 11:23 AM    CLINICAL HISTORY: Flank pain, stone disease suspected; Abdominal  bloating; Abdominal pain, generalized  TECHNIQUE: CT scan of the abdomen and pelvis was performed without IV  contrast. Multiplanar reformats were obtained. Dose reduction  techniques were used.  CONTRAST: None.    COMPARISON: 7/24/2006    FINDINGS:   LOWER CHEST: Normal.    HEPATOBILIARY: Small cyst in the left hepatic lobe.    PANCREAS:  Normal.    SPLEEN: Normal.    ADRENAL GLANDS: Normal.    KIDNEYS/BLADDER: Moderate right hydronephrosis. Obstructing calculus  at the right ureteropelvic junction measures 9 x 5 x 10 mm. There is a  9 cm cyst at the lower pole of the right kidney. 2 mm nonobstructing  right renal calculus. No left sided calculi.    BOWEL: Sigmoid colonic diverticulosis.    LYMPH NODES: Normal.    VASCULATURE: Unremarkable.    PELVIC ORGANS: Normal.    OTHER: None.    MUSCULOSKELETAL: Left hip arthroplasty.      Impression    IMPRESSION:   1.  10 mm obstructing calculus at the right ureteropelvic junction,  with moderate right hydronephrosis.    MIKE RAI MD

## 2020-10-14 NOTE — ED PROVIDER NOTES
Emergency Department Attending Supervision Note  10/14/2020  5:17 PM      I evaluated this patient in conjunction with Leon Huynh PA      Briefly, the patient presented for evaluation of right sided flank/abdominal pain/fullness starting one week ago. The patient had a CT completed at his primary yesterday and was found to have a 1cm obstruction stone at the right ureteropelvic junction. The patient notes no pain now but endorses fullness. CT also showed moderate right hydronephrosis.       On my exam  General: Patient is alert, awake and interactive when I enter the room  Head: The scalp, face, and head appear normal  Eyes: Conjunctivae are normal  ENT: The nose is normal, Pinnae are normal, External acoustic canals are normal  Neck: Trachea midline  CV: Pulses are normal.   GI: No significant abdominal tenderness or CVA tenderness  Resp: No respiratory distress   Musc: Normal muscular tone, moving all extremities.  Skin: No rash or lesions noted  Neuro:  Speech is normal and fluent. Face is symmetric.   Psych: Normal affect.  Appropriate interactions.        Results:    Labs Ordered and Resulted from Time of ED Arrival Up to the Time of Departure from the ED   BASIC METABOLIC PANEL - Abnormal; Notable for the following components:       Result Value    Glucose 100 (*)     Creatinine 1.63 (*)     GFR Estimate 44 (*)     GFR Estimate If Black 51 (*)     All other components within normal limits   ROUTINE UA WITH MICROSCOPIC - Abnormal; Notable for the following components:    Blood Urine Small (*)     Protein Albumin Urine 10 (*)     RBC Urine 15 (*)     All other components within normal limits   CBC WITH PLATELETS DIFFERENTIAL   COVID-19 VIRUS (CORONAVIRUS) BY PCR         ED course:    1810 I performed my exam of the patient. I believe admission is warranted at this time.      My impression  Patient will be admitted to the hospital for 1 cm obstructing right-sided kidney stone at the [facility].  With new  renal failure.  Pain is well controlled in the emergency department.  No evidence of infection.  Urology was consulted and will evaluate the patient in hospital.        Diagnosis    ICD-10-CM    1. Acute kidney injury (H)  N17.9 Asymptomatic COVID-19 Virus (Coronavirus) by PCR   2. Right ureteral stone  N20.1            Scribe Disclosure:  I, Jag Kathleen, am serving as a scribe at 4:55 PM on 10/14/2020 to document services personally performed by Rajendra Alfaro MD based on my observations and the provider's statements to me.      Rajendra Alfaro MD  10/14/20 6941

## 2020-10-14 NOTE — ED PROVIDER NOTES
"  History     Chief Complaint:  Abdominal Pain       HPI  Jeffrey Conteh is a 65 year old male with a history of diverticulitis who presents to the emergency department for evaluation of abdominal pain. Patient states he began having right sided flank/abdominal pain starting a week ago with associated abdominal fullness, nausea, and loss of appetite. He presented to his primary yesterday where CT was performed showing a 1 cm obstructing the right ureteropelvic junction, full results described below. Patient states he currently denies any pain but complains of abdominal fullness.  He denies any vomiting and is not using NSAIDs.  He has been drinking fluids appropriately.    CT Abdomen pelvis with contrast:   IMPRESSION:   1.  10 mm obstructing calculus at the right ureteropelvic junction,   with moderate right hydronephrosis. as per radiology.    Allergies:  No known drug allergies    Medications:    Vitamin D  Fluticasone  Naproxen    Past Medical History:    BRITTA  Diverticulitis  HLD  Rosacea  Arthritis    Past Surgical History:    Appendectomy  STELLA  Septoplasty    Family History:    HTN - father  Parkinsonism - father  Dementia - father  Cancer - father, mother    Social History:  Smoking Status: Never Smoker  Alcohol Use: Yes  Drug Use: No  PCP: Doyle Silva   The patient presents to the emergency department with his son.  Marital Status:     Review of Systems   Gastrointestinal: Positive for abdominal distention, abdominal pain and nausea.   Genitourinary: Positive for flank pain.   All other systems reviewed and are negative.    Physical Exam     Patient Vitals for the past 24 hrs:   BP Temp Temp src Pulse Resp SpO2 Height   10/14/20 1631 (!) 161/100 -- -- 83 -- 96 % --   10/14/20 1630 -- -- -- -- -- 97 % --   10/14/20 1622 -- -- -- -- 18 -- --   10/14/20 1454 (!) 153/93 98.6  F (37  C) Oral 96 -- 95 % 1.854 m (6' 1\")         Physical Exam  General: Alert and cooperative with exam. [facility]  Head:  Scalp is NC/AT  Eyes:  Conjunctiva normal, PERRL  ENT:  The external nose and ears are normal.   Neck:  Normal range of motion without rigidity.  CV:  Regular rate and rhythm    No pathologic murmur, rubs, or gallops.  Resp:  Breath sounds are clear bilaterally.  No crackles, wheezes, rhonchi, stridor.    Non-labored, no retractions or accessory muscle use  Abdomen: Abdomen is soft, no distension, no tenderness, no masses. No peritoneal signs. No CVA tenderness.  MS:  No lower extremity edema or asymmetric calf swelling. Normal ROM in all joints without effusions.    No midline cervical, thoracic, or lumbar tenderness  Skin:  Warm and dry, No rash or lesions noted. 2+ peripheral pulses in all extremities  Neuro: Alert, oriented.  No gross deficits.  Psych: Awake. Alert. Normal affect. Appropriate interactions.      Emergency Department Course   Laboratory:  CBC: WBC: 8.1, HGB: 15.8, PLT: 341  BMP: Glucose 100 (H), GFR: 44 (L), o/w WNL (Creatinine: 1.63 (H))  UA: Blood: Small, Protein Albumin: 10, RBC: 15 (H), o/w Negative  Asymptomatic COVID-19 PCR: Pending     Interventions:  NS 1000 mL IV    Emergency Department Course:  Nursing notes and vitals reviewed. (160) I performed an exam of the patient as documented above.     IV inserted. Medicine administered as documented above. Blood drawn. Urine sample obtained. This was sent to the lab for further testing, results above.     170 I spoke with Dr. Madrid of Urology and discussed the case.     I rechecked the patient and discussed the results of his workup thus far.      I spoke with Dr. Anderson of the hospitalist services, who is in agreement to accept the patient for admission for further monitoring, evaluation, and treatment. Findings and plan explained to the Patient who consents to admission.     Impression & Plan    Medical Decision Makin-year-old male who presents with right-sided ureteral stone seen on CT today.  Was sent over by  primary care provider for concern for elevated creatinine.  Creatinine yesterday and today noted to be 1.62-1.63 up from prior baseline of 0.9 approximately 1 year ago.  No dehydration or NSAID use to suggest prerenal etiology.  Appears to have both kidneys working, but given creatinine elevation and obstructing stone discussed with urology who recommended admission with plan for stone stenting/removal tomorrow as patient is not n.p.o. at this time.  No evidence of infected and afebrile with normal white blood cell count and urine not suggestive of infection.  Discussed with hospitalist who agreed to accept the patient for observation with plan for n.p.o. after midnight.  His pain is actually quite well controlled and he does not require any pain medication at this time.    Covid-19  Jeffrey Conteh was evaluated during a global COVID-19 pandemic, which necessitated consideration that the patient might be at risk for infection with the SARS-CoV-2 virus that causes COVID-19.   Applicable protocols for evaluation were followed during the patient's care.   COVID-19 was considered as part of the patient's evaluation. The plan for testing is:  a test was obtained during this visit.    Diagnosis:    ICD-10-CM    1. Acute kidney injury (H)  N17.9    2. Right ureteral stone  N20.1        Disposition:  Admitted to the hospital    Mahamed Suresh  10/14/2020   EMERGENCY DEPARTMENT  Scribe Disclosure:  I, Mahamed Suresh, am serving as a scribe at 4:07 PM on 10/14/2020 to document services personally performed by Leon Huynh PA-C based on my observations and the provider's statements to me.          Leon Huynh PA-C  10/14/20 9464     No

## 2020-10-15 ENCOUNTER — APPOINTMENT (OUTPATIENT)
Dept: GENERAL RADIOLOGY | Facility: CLINIC | Age: 65
End: 2020-10-15
Attending: UROLOGY
Payer: COMMERCIAL

## 2020-10-15 ENCOUNTER — APPOINTMENT (OUTPATIENT)
Dept: GENERAL RADIOLOGY | Facility: CLINIC | Age: 65
End: 2020-10-15
Attending: PHYSICIAN ASSISTANT
Payer: COMMERCIAL

## 2020-10-15 ENCOUNTER — ANESTHESIA (OUTPATIENT)
Dept: SURGERY | Facility: CLINIC | Age: 65
End: 2020-10-15
Payer: COMMERCIAL

## 2020-10-15 ENCOUNTER — ANESTHESIA EVENT (OUTPATIENT)
Dept: SURGERY | Facility: CLINIC | Age: 65
End: 2020-10-15
Payer: COMMERCIAL

## 2020-10-15 VITALS
DIASTOLIC BLOOD PRESSURE: 92 MMHG | SYSTOLIC BLOOD PRESSURE: 153 MMHG | HEART RATE: 61 BPM | TEMPERATURE: 97.9 F | WEIGHT: 217.8 LBS | HEIGHT: 73 IN | OXYGEN SATURATION: 96 % | BODY MASS INDEX: 28.86 KG/M2 | RESPIRATION RATE: 15 BRPM

## 2020-10-15 LAB
ANION GAP SERPL CALCULATED.3IONS-SCNC: 3 MMOL/L (ref 3–14)
BUN SERPL-MCNC: 17 MG/DL (ref 7–30)
CALCIUM SERPL-MCNC: 8.7 MG/DL (ref 8.5–10.1)
CHLORIDE SERPL-SCNC: 110 MMOL/L (ref 94–109)
CO2 SERPL-SCNC: 28 MMOL/L (ref 20–32)
CREAT SERPL-MCNC: 1.67 MG/DL (ref 0.66–1.25)
GFR SERPL CREATININE-BSD FRML MDRD: 43 ML/MIN/{1.73_M2}
GLUCOSE SERPL-MCNC: 97 MG/DL (ref 70–99)
POTASSIUM SERPL-SCNC: 4 MMOL/L (ref 3.4–5.3)
SODIUM SERPL-SCNC: 141 MMOL/L (ref 133–144)

## 2020-10-15 PROCEDURE — 999N000179 XR SURGERY CARM FLUORO LESS THAN 5 MIN W STILLS: Mod: TC

## 2020-10-15 PROCEDURE — 36415 COLL VENOUS BLD VENIPUNCTURE: CPT | Performed by: INTERNAL MEDICINE

## 2020-10-15 PROCEDURE — 80048 BASIC METABOLIC PNL TOTAL CA: CPT | Performed by: INTERNAL MEDICINE

## 2020-10-15 PROCEDURE — 255N000002 HC RX 255 OP 636: Performed by: UROLOGY

## 2020-10-15 PROCEDURE — 250N000013 HC RX MED GY IP 250 OP 250 PS 637: Performed by: INTERNAL MEDICINE

## 2020-10-15 PROCEDURE — 258N000003 HC RX IP 258 OP 636: Performed by: ANESTHESIOLOGY

## 2020-10-15 PROCEDURE — 999N000138 HC STATISTIC PRE-PROCEDURE ASSESSMENT I: Performed by: UROLOGY

## 2020-10-15 PROCEDURE — 360N000015 HC SURGERY LEVEL 2 EA 15 ADDTL MIN: Performed by: UROLOGY

## 2020-10-15 PROCEDURE — 74019 RADEX ABDOMEN 2 VIEWS: CPT

## 2020-10-15 PROCEDURE — 52352 CYSTOURETERO W/STONE REMOVE: CPT | Mod: RT | Performed by: UROLOGY

## 2020-10-15 PROCEDURE — 250N000011 HC RX IP 250 OP 636: Performed by: STUDENT IN AN ORGANIZED HEALTH CARE EDUCATION/TRAINING PROGRAM

## 2020-10-15 PROCEDURE — 250N000011 HC RX IP 250 OP 636: Performed by: INTERNAL MEDICINE

## 2020-10-15 PROCEDURE — 74420 UROGRAPHY RTRGR +-KUB: CPT | Mod: 26 | Performed by: UROLOGY

## 2020-10-15 PROCEDURE — 370N000002 HC ANESTHESIA TECHNICAL FEE, EACH ADDTL 15 MIN: Performed by: UROLOGY

## 2020-10-15 PROCEDURE — 250N000003 HC SEVOFLURANE, EA 15 MIN: Performed by: UROLOGY

## 2020-10-15 PROCEDURE — 250N000011 HC RX IP 250 OP 636: Performed by: NURSE ANESTHETIST, CERTIFIED REGISTERED

## 2020-10-15 PROCEDURE — 99217 PR OBSERVATION CARE DISCHARGE: CPT | Performed by: INTERNAL MEDICINE

## 2020-10-15 PROCEDURE — 360N000014 HC SURGERY LEVEL 2 1ST 30 MIN: Performed by: UROLOGY

## 2020-10-15 PROCEDURE — 52332 CYSTOSCOPY AND TREATMENT: CPT | Mod: RT | Performed by: UROLOGY

## 2020-10-15 PROCEDURE — 370N000001 HC ANESTHESIA TECHNICAL FEE, 1ST 30 MIN: Performed by: UROLOGY

## 2020-10-15 PROCEDURE — G0378 HOSPITAL OBSERVATION PER HR: HCPCS

## 2020-10-15 PROCEDURE — C2617 STENT, NON-COR, TEM W/O DEL: HCPCS | Performed by: UROLOGY

## 2020-10-15 PROCEDURE — 272N000001 HC OR GENERAL SUPPLY STERILE: Performed by: UROLOGY

## 2020-10-15 PROCEDURE — 761N000001 HC RECOVERY PHASE 1 LEVEL 1 FIRST HR: Performed by: UROLOGY

## 2020-10-15 PROCEDURE — 250N000009 HC RX 250: Performed by: NURSE ANESTHETIST, CERTIFIED REGISTERED

## 2020-10-15 PROCEDURE — 250N000009 HC RX 250: Performed by: UROLOGY

## 2020-10-15 DEVICE — URETERAL STENT
Type: IMPLANTABLE DEVICE | Site: URETHRA | Status: FUNCTIONAL
Brand: CONTOUR™

## 2020-10-15 RX ORDER — HYDRALAZINE HYDROCHLORIDE 20 MG/ML
2.5-5 INJECTION INTRAMUSCULAR; INTRAVENOUS EVERY 10 MIN PRN
Status: DISCONTINUED | OUTPATIENT
Start: 2020-10-15 | End: 2020-10-15 | Stop reason: HOSPADM

## 2020-10-15 RX ORDER — HYDROMORPHONE HYDROCHLORIDE 1 MG/ML
.3-.5 INJECTION, SOLUTION INTRAMUSCULAR; INTRAVENOUS; SUBCUTANEOUS EVERY 5 MIN PRN
Status: DISCONTINUED | OUTPATIENT
Start: 2020-10-15 | End: 2020-10-15 | Stop reason: HOSPADM

## 2020-10-15 RX ORDER — SODIUM CHLORIDE, SODIUM LACTATE, POTASSIUM CHLORIDE, CALCIUM CHLORIDE 600; 310; 30; 20 MG/100ML; MG/100ML; MG/100ML; MG/100ML
INJECTION, SOLUTION INTRAVENOUS CONTINUOUS
Status: DISCONTINUED | OUTPATIENT
Start: 2020-10-15 | End: 2020-10-15 | Stop reason: HOSPADM

## 2020-10-15 RX ORDER — ALBUTEROL SULFATE 0.83 MG/ML
2.5 SOLUTION RESPIRATORY (INHALATION) EVERY 4 HOURS PRN
Status: DISCONTINUED | OUTPATIENT
Start: 2020-10-15 | End: 2020-10-15 | Stop reason: HOSPADM

## 2020-10-15 RX ORDER — FENTANYL CITRATE 50 UG/ML
INJECTION, SOLUTION INTRAMUSCULAR; INTRAVENOUS PRN
Status: DISCONTINUED | OUTPATIENT
Start: 2020-10-15 | End: 2020-10-15

## 2020-10-15 RX ORDER — ONDANSETRON 2 MG/ML
4 INJECTION INTRAMUSCULAR; INTRAVENOUS EVERY 30 MIN PRN
Status: DISCONTINUED | OUTPATIENT
Start: 2020-10-15 | End: 2020-10-15 | Stop reason: HOSPADM

## 2020-10-15 RX ORDER — LIDOCAINE HYDROCHLORIDE 20 MG/ML
INJECTION, SOLUTION INFILTRATION; PERINEURAL PRN
Status: DISCONTINUED | OUTPATIENT
Start: 2020-10-15 | End: 2020-10-15

## 2020-10-15 RX ORDER — NALOXONE HYDROCHLORIDE 0.4 MG/ML
.1-.4 INJECTION, SOLUTION INTRAMUSCULAR; INTRAVENOUS; SUBCUTANEOUS
Status: DISCONTINUED | OUTPATIENT
Start: 2020-10-15 | End: 2020-10-15 | Stop reason: HOSPADM

## 2020-10-15 RX ORDER — PROPOFOL 10 MG/ML
INJECTION, EMULSION INTRAVENOUS PRN
Status: DISCONTINUED | OUTPATIENT
Start: 2020-10-15 | End: 2020-10-15

## 2020-10-15 RX ORDER — IOPAMIDOL 612 MG/ML
INJECTION, SOLUTION INTRAVASCULAR PRN
Status: DISCONTINUED | OUTPATIENT
Start: 2020-10-15 | End: 2020-10-15 | Stop reason: HOSPADM

## 2020-10-15 RX ORDER — ONDANSETRON 4 MG/1
4 TABLET, ORALLY DISINTEGRATING ORAL EVERY 30 MIN PRN
Status: DISCONTINUED | OUTPATIENT
Start: 2020-10-15 | End: 2020-10-15 | Stop reason: HOSPADM

## 2020-10-15 RX ORDER — CEFAZOLIN SODIUM 2 G/100ML
2 INJECTION, SOLUTION INTRAVENOUS
Status: COMPLETED | OUTPATIENT
Start: 2020-10-15 | End: 2020-10-15

## 2020-10-15 RX ORDER — CIPROFLOXACIN 250 MG/1
250 TABLET, FILM COATED ORAL 2 TIMES DAILY
Qty: 2 TABLET | Refills: 0 | Status: SHIPPED | OUTPATIENT
Start: 2020-10-15 | End: 2020-10-16

## 2020-10-15 RX ORDER — DEXAMETHASONE SODIUM PHOSPHATE 4 MG/ML
INJECTION, SOLUTION INTRA-ARTICULAR; INTRALESIONAL; INTRAMUSCULAR; INTRAVENOUS; SOFT TISSUE PRN
Status: DISCONTINUED | OUTPATIENT
Start: 2020-10-15 | End: 2020-10-15

## 2020-10-15 RX ORDER — PROPOFOL 10 MG/ML
INJECTION, EMULSION INTRAVENOUS CONTINUOUS PRN
Status: DISCONTINUED | OUTPATIENT
Start: 2020-10-15 | End: 2020-10-15

## 2020-10-15 RX ORDER — FENTANYL CITRATE 50 UG/ML
25-50 INJECTION, SOLUTION INTRAMUSCULAR; INTRAVENOUS
Status: DISCONTINUED | OUTPATIENT
Start: 2020-10-15 | End: 2020-10-15 | Stop reason: HOSPADM

## 2020-10-15 RX ORDER — ONDANSETRON 2 MG/ML
INJECTION INTRAMUSCULAR; INTRAVENOUS PRN
Status: DISCONTINUED | OUTPATIENT
Start: 2020-10-15 | End: 2020-10-15

## 2020-10-15 RX ORDER — CEFAZOLIN SODIUM 1 G/3ML
1 INJECTION, POWDER, FOR SOLUTION INTRAMUSCULAR; INTRAVENOUS SEE ADMIN INSTRUCTIONS
Status: DISCONTINUED | OUTPATIENT
Start: 2020-10-15 | End: 2020-10-15 | Stop reason: HOSPADM

## 2020-10-15 RX ORDER — KETOROLAC TROMETHAMINE 10 MG/1
10 TABLET, FILM COATED ORAL EVERY 6 HOURS PRN
Qty: 10 TABLET | Refills: 0 | Status: SHIPPED | OUTPATIENT
Start: 2020-10-15 | End: 2020-10-27

## 2020-10-15 RX ADMIN — LIDOCAINE HYDROCHLORIDE 100 MG: 20 INJECTION, SOLUTION INFILTRATION; PERINEURAL at 12:13

## 2020-10-15 RX ADMIN — DEXAMETHASONE SODIUM PHOSPHATE 4 MG: 4 INJECTION, SOLUTION INTRA-ARTICULAR; INTRALESIONAL; INTRAMUSCULAR; INTRAVENOUS; SOFT TISSUE at 12:17

## 2020-10-15 RX ADMIN — CEFAZOLIN SODIUM 2 G: 2 INJECTION, SOLUTION INTRAVENOUS at 12:16

## 2020-10-15 RX ADMIN — MIDAZOLAM 2 MG: 1 INJECTION INTRAMUSCULAR; INTRAVENOUS at 12:05

## 2020-10-15 RX ADMIN — PROPOFOL 20 MCG/KG/MIN: 10 INJECTION, EMULSION INTRAVENOUS at 12:14

## 2020-10-15 RX ADMIN — POTASSIUM CHLORIDE AND SODIUM CHLORIDE: 900; 150 INJECTION, SOLUTION INTRAVENOUS at 09:00

## 2020-10-15 RX ADMIN — FENTANYL CITRATE 50 MCG: 50 INJECTION, SOLUTION INTRAMUSCULAR; INTRAVENOUS at 12:30

## 2020-10-15 RX ADMIN — ONDANSETRON 4 MG: 2 INJECTION INTRAMUSCULAR; INTRAVENOUS at 12:18

## 2020-10-15 RX ADMIN — SODIUM CHLORIDE, POTASSIUM CHLORIDE, SODIUM LACTATE AND CALCIUM CHLORIDE: 600; 310; 30; 20 INJECTION, SOLUTION INTRAVENOUS at 10:59

## 2020-10-15 RX ADMIN — FENTANYL CITRATE 50 MCG: 50 INJECTION, SOLUTION INTRAMUSCULAR; INTRAVENOUS at 12:13

## 2020-10-15 RX ADMIN — DOXAZOSIN 1 MG: 1 TABLET ORAL at 09:00

## 2020-10-15 RX ADMIN — DOCUSATE SODIUM 50 MG AND SENNOSIDES 8.6 MG 1 TABLET: 8.6; 5 TABLET, FILM COATED ORAL at 09:01

## 2020-10-15 RX ADMIN — DOCUSATE SODIUM 100 MG: 100 CAPSULE, LIQUID FILLED ORAL at 09:00

## 2020-10-15 RX ADMIN — PROPOFOL 200 MG: 10 INJECTION, EMULSION INTRAVENOUS at 12:13

## 2020-10-15 ASSESSMENT — ENCOUNTER SYMPTOMS
DYSRHYTHMIAS: 0
SEIZURES: 0

## 2020-10-15 ASSESSMENT — LIFESTYLE VARIABLES: TOBACCO_USE: 0

## 2020-10-15 ASSESSMENT — MIFFLIN-ST. JEOR: SCORE: 1836.81

## 2020-10-15 NOTE — DISCHARGE INSTRUCTIONS
Same Day Surgery Discharge Instructions for  Sedation and General Anesthesia       It's not unusual to feel dizzy, light-headed or faint for up to 24 hours after surgery or while taking pain medication.  If you have these symptoms: sit for a few minutes before standing and have someone assist you when you get up to walk or use the bathroom.      You should rest and relax for the next 24 hours. We recommend you make arrangements to have an adult stay with you for at least 24 hours after your discharge.  Avoid hazardous and strenuous activity.      DO NOT DRIVE any vehicle or operate mechanical equipment for 24 hours following the end of your surgery.  Even though you may feel normal, your reactions may be affected by the medication you have received.      Do not drink alcoholic beverages for 24 hours following surgery.       Slowly progress to your regular diet as you feel able. It's not unusual to feel nauseated and/or vomit after receiving anesthesia.  If you develop these symptoms, drink clear liquids (apple juice, ginger ale, broth, 7-up, etc. ) until you feel better.  If your nausea and vomiting persists for 24 hours, please notify your surgeon.        All narcotic pain medications, along with inactivity and anesthesia, can cause constipation. Drinking plenty of liquids and increasing fiber intake will help.      For any questions of a medical nature, call your surgeon.      Do not make important decisions for 24 hours.      If you had general anesthesia, you may have a sore throat for a couple of days related to the breathing tube used during surgery.  You may use Cepacol lozenges to help with this discomfort.  If it worsens or if you develop a fever, contact your surgeon.       If you feel your pain is not well managed with the pain medications prescribed by your surgeon, please contact your surgeon's office to let them know so they can address your concerns.       CoVid 19 Information    We want to give you  information regarding Covid. Please consult your primary care provider with any questions you might have.     Patient who have symptoms (cough, fever, or shortness of breath), need to isolate for 7 days from when symptoms started OR 72 hours after fever resolves (without fever reducing medications) AND improvement of respiratory symptoms (whichever is longer).      Isolate yourself at home (in own room/own bathroom if possible)    Do Not allow any visitors    Do Not go to work or school    Do Not go to Worship,  centers, shopping, or other public places.    Do Not shake hands.    Avoid close and intimate contact with others (hugging, kissing).    Follow CDC recommendations for household cleaning of frequently touched services.     After the initial 7 days, continue to isolate yourself from household members as much as possible. To continue decrease the risk of community spread and exposure, you and any members of your household should limit activities in public for 14 days after starting home isolation.     You can reference the following CDC link for helpful home isolation/care tips:  https://www.cdc.gov/coronavirus/2019-ncov/downloads/10Things.pdf    Protect Others:    Cover Your Mouth and Nose with a mask, disposable tissue or wash cloth to avoid spreading germs to others.    Wash your hands and face frequently with soap and water    Call Your Primary Doctor If: Breathing difficulty develops or you become worse.    For more information about COVID19 and options for caring for yourself at home, please visit the CDC website at https://www.cdc.gov/coronavirus/2019-ncov/about/steps-when-sick.html  For more options for care at [facility], please visit our website at [email or web address]    STENT INFORMATION SHEET  [facility], MALCOLM Artis M.D.,  MARIA DEL CARMEN Butt M.D.    LEAH Perez M.D., FRANCE Schaffer M.D.  [phone]    During surgery, a stent may be place in the  ureter.  The ureter is the tube that drains urine from the kidney to the bladder.  The stent is placed to dilate (open) the ureter so the stone fragments can pass easily through the ureter or to decrease ureteral swelling after surgery, or to relieve an obstruction.    The stent is made of rubber.  The upper end of the stent curls in the kidney while the lower end rests in the bladder.    While the stent is in place you may experience the following symptoms:    Blood and/or small blood clots in urine.    Bladder spasm (frequency and urgency of urination).    Discomfort or aching in the back or side where the stent is.    Burning or discomfort at the end of urine stream.    To decrease these symptoms you should:    Take pain medication as prescribed.    Drink plenty of fluids.    If you experience pain at the end of urination try not emptying your bladder completely.    If having discomfort in back or side, decrease activity.    Please call your physician or the physician on call if you experience:    Fever greater than 101 .    Severe pain not relieved by pain medication or rest.      Please make an appointment for removal of the stent according to your physician s instructions.      **If you have questions or concerns about your procedure,   call Dr. Butt at [phone]**

## 2020-10-15 NOTE — DISCHARGE SUMMARY
[facility]    Discharge Summary  Hospitalist    Date of Admission:  10/14/2020  Date of Discharge:  10/15/2020  Discharging Provider: Juan Douglas  Date of Service (when I saw the patient): 10/15/20      History of Present Illness  From admission H&P  Jeffrey Conteh is a 65 year old male with h/o diverticulitis who presents with clinic with a recent CT scan showing a 10 mm obstructing calculus at the right ureteropelvic junction with moderate right hydronephrosis.  An elevated creatinine of 1.6 (baseline 0.9).  He states that approximately a week ago he had an episode of flank pain and then developed fevers with some mild epigastric pain.  He thought he had COVID this was negative.  He denies any other symptoms of COVID such as shortness of breath or cough or loss of taste or smell or diarrhea.  He is actually been constipated.  The fever resolved but he continued to have some mild epigastric pain. He had taken MiraLAX for the constipation, but continued to feel backed up.  He therefore went to his clinic doctor today.  He was afebrile hemodynamically stable, and CT scan showed stone and hydronephrosis as above with elevated creatinine.  His clinic doctor therefore recommended that he present to the ER.  Nephrology Dr. Madrid was consulted and recommended admission with probable procedure tomorrow.     Patient states except for mild abdominal pain he is feeling well.  Currently he has no fevers no flank pain no urinary symptoms no blood in the urine.  Although Aleve is on his med list.  He denies taking any recent NSAIDs.  He only has been using Tylenol.        Hospital Course   Jeffrey Conteh was admitted on 10/14/2020.  The following problems were addressed during his hospitalization:    Right obstructing nephrolithiasis  Hydronephrosis, Right with CHUCK  Patient had cystoscopy on 10/15 but stone was unable to be extracted so it was displaced into the kidney, stent inserted with  plans for outpatient follow up and lithotripsy in the clinic.   -cipro x 2 doses per urology  -follow up in urology clinic  -repeat BMP early next week to ensure creatinine back to baseline     HTN noted and patient denies significant pain. Asymptomatic.   - asked patient to follow up with PCP next week to see if this persists and if treatment would be warranted     Constipation  - prn OTC regimen at home    Juan Douglas DO        Pending Results   Unresulted Labs Ordered in the Past 30 Days of this Admission     No orders found for last 31 day(s).          Code Status   Full Code       Primary Care Physician   Doyle Silva    General: standing in the room, feeling well  Cardiovascular: RRR  Pulmonary: CTAB  GI: +BS  Lymphatics: no edema  Skin: no rash    Discharge Disposition   Discharged to home  Condition at discharge: Stable    Consultations This Hospital Stay   UROLOGY IP CONSULT    Time Spent on this Encounter   I, Juan Douglas DO, personally saw the patient today and spent less than or equal to 30 minutes discharging this patient.    Discharge Orders      Reason for your hospital stay    You had a kidney stone that was too large for extraction so you will need to have a procedure called lithotripsy which will be set up in the clinic.  This is a shockwave treatment that breaks up the stone into smaller pieces that can then pass.  Your creatinine (kidney test) was a bit high due to the kidney stone so you should have this checked next week.  Your blood pressure was also slightly elevated at times, thought due to in part due to discomfort from the stone.  You should follow up with your primary doctor to see if this remains elevated and if you need treatment.     Follow-up and recommended labs and tests     Follow up with urology in the clinic - call for appointment ++ follow up with PCP next week for blood pressure check and kidney function test     Activity    Your activity upon discharge:  activity as tolerated     When to contact your care team    If you have recurrent pain similar to what brought you in initially     Full Code     Diet    Follow this diet upon discharge: Orders Placed This Encounter      Regular Diet Adult     Discharge Medications   Current Discharge Medication List      START taking these medications    Details   ciprofloxacin (CIPRO) 250 MG tablet Take 1 tablet (250 mg) by mouth 2 times daily for 2 doses  Qty: 2 tablet, Refills: 0    Associated Diagnoses: Right ureteral stone      ketorolac (TORADOL) 10 MG tablet Take 1 tablet (10 mg) by mouth every 6 hours as needed  Qty: 10 tablet, Refills: 0    Associated Diagnoses: Right ureteral stone         CONTINUE these medications which have NOT CHANGED    Details   calcium carbonate antacid (TUMS ULTRA 1000) 1000 MG CHEW Take 1 chew tab by mouth every morning       Cholecalciferol (VITAMIN D) 1000 UNITS capsule Take 1 capsule by mouth every morning       diphenhydrAMINE-acetaminophen (TYLENOL PM)  MG tablet Take 1 tablet by mouth nightly as needed for sleep      Magnesium 500 MG CAPS Take 500 mg by mouth every morning       multivitamin, therapeutic with minerals (MULTI-VITAMIN) TABS tablet Take 1 tablet by mouth every morning Senior 55 plus      naproxen (NAPROSYN) 500 MG tablet Take 500 mg by mouth 2 times daily as needed for moderate pain       polyethylene glycol (MIRALAX) 17 g packet Take 1 packet by mouth every morning      psyllium (METAMUCIL/KONSYL) 58.6 % powder Take 2 teaspoonful by mouth every morning      fluticasone (FLONASE) 50 MCG/ACT nasal spray SHAKE LIQUID AND USE 1 SPRAY IN EACH NOSTRIL DAILY  Qty: 48 g, Refills: 1    Associated Diagnoses: Acute sinusitis with symptoms > 10 days; PND (post-nasal drip)           Allergies   No Known Allergies  Data   Most Recent 3 CBC's:  Recent Labs   Lab Test 10/14/20  1609 10/13/20  1047 09/07/19  0845   WBC 8.1 8.1 8.2   HGB 15.8 15.1 16.1   MCV 86 86 86    310 284       Most Recent 3 BMP's:  Recent Labs   Lab Test 10/15/20  0744 10/14/20  1609 10/13/20  1047    140 139   POTASSIUM 4.0 3.8 4.2   CHLORIDE 110* 109 110*   CO2 28 28 25   BUN 17 19 18   CR 1.67* 1.63* 1.62*   ANIONGAP 3 3 4   MICA 8.7 9.1 8.6   GLC 97 100* 93     Most Recent 2 LFT's:  Recent Labs   Lab Test 10/13/20  1047 09/07/19  0845   AST 15 11   ALT 25 19   ALKPHOS 80 103   BILITOTAL 0.5 0.5     Most Recent INR's and Anticoagulation Dosing History:  Anticoagulation Dose History     Recent Dosing and Labs Latest Ref Rng & Units 7/16/2005 7/19/2005 7/20/2005 7/21/2005    INR 0.86 - 1.14 1.00 1.06 1.20(H) 1.18(H)        Most Recent 3 Troponin's:No lab results found.  Most Recent Cholesterol Panel:  Recent Labs   Lab Test 09/19/19  1050   CHOL 179   *   HDL 44   TRIG 110     Most Recent 6 Bacteria Isolates From Any Culture (See EPIC Reports for Culture Details):No lab results found.  Most Recent TSH, T4 and A1c Labs:No lab results found.  Results for orders placed or performed during the hospital encounter of 10/14/20   XR KUB    Narrative    ABDOMEN ONE VIEW  10/15/2020 11:34 AM     HISTORY: Right ureteral stone.    COMPARISON: Abdominal radiograph performed 10/13/2020. Noncontrast CT  of the abdomen and pelvis performed 10/14/2020.      Impression    IMPRESSION: Previously described 1 cm stone at the right ureteropelvic  junction is unchanged in position. No other definite urinary calculi  are seen. A few small rounded calcifications projected over the left  pelvis are unchanged, and likely represent phleboliths. Bowel gas  pattern is within normal limits. Left hip arthroplasty.    LISA BARAJAS MD   XR Surgery RENEE Fluoro L/T 5 Min w Stills    Narrative    This exam was marked as non-reportable because it will not be read by a   radiologist or a [place] non-radiologist provider.

## 2020-10-15 NOTE — ANESTHESIA CARE TRANSFER NOTE
Patient: Jeffrey Conteh    Procedure(s):  CYSTOSCOPY, WITH RIGHT URETERAL STENT PLACEMENT, RIGHT RETROGRADE PYLEOGRAM, RIGHT URETERAL STONE DISPACEMENT WITHOUT REMOVAL    Diagnosis: Right ureteral stone [N20.1]  Diagnosis Additional Information: No value filed.    Anesthesia Type:   General     Note:  Airway :Face Mask  Patient transferred to:PACU  Comments: Anesthesia Care Note    Patient: Jeffrey Conteh    Transferred to: PACU    Patient vital signs: Stable    Airway: FM    Monitors placed. VSS. PIV patent. No change in dentition. Report given to CHRISTINA Zepeda CRNA   10/15/2020  Handoff Report: Identifed the Patient, Identified the Reponsible Provider, Reviewed the pertinent medical history, Discussed the surgical course, Reviewed Intra-OP anesthesia mangement and issues during anesthesia, Set expectations for post-procedure period and Allowed opportunity for questions and acknowledgement of understanding      Vitals: (Last set prior to Anesthesia Care Transfer)    CRNA VITALS  10/15/2020 1218 - 10/15/2020 1252      10/15/2020             NIBP:  113/75    Pulse:  59    NIBP Mean:  84    SpO2:  98 %    Resp Rate (set):  10                Electronically Signed By: GREG Hanson CRNA  October 15, 2020  12:52 PM

## 2020-10-15 NOTE — ANESTHESIA PREPROCEDURE EVALUATION
Anesthesia Pre-Procedure Evaluation    Patient: Jeffrey Conteh   MRN: [id number] : 1955          Preoperative Diagnosis: Right ureteral stone [N20.1]    Procedure(s):  CYSTOSCOPY, WITH RIGHT URETERAL STENT PLACEMENT,    No past medical history on file.  Past Surgical History:   Procedure Laterality Date     APPENDECTOMY       along with diverticulities      C TOTAL HIP ARTHROPLASTY           SEPTOPLASTY       No Known Allergies  Prior to Admission medications    Medication Sig Start Date End Date Taking? Authorizing Provider   calcium carbonate antacid (TUMS ULTRA 1000) 1000 MG CHEW Take 1 chew tab by mouth every morning    Yes Reported, Patient   Cholecalciferol (VITAMIN D) 1000 UNITS capsule Take 1 capsule by mouth every morning    Yes Reported, Patient   diphenhydrAMINE-acetaminophen (TYLENOL PM)  MG tablet Take 1 tablet by mouth nightly as needed for sleep   Yes Unknown, Entered By History   Magnesium 500 MG CAPS Take 500 mg by mouth every morning    Yes Reported, Patient   multivitamin, therapeutic with minerals (MULTI-VITAMIN) TABS tablet Take 1 tablet by mouth every morning Senior 55 plus   Yes Reported, Patient   naproxen (NAPROSYN) 500 MG tablet Take 500 mg by mouth 2 times daily as needed for moderate pain    Yes Unknown, Entered By History   polyethylene glycol (MIRALAX) 17 g packet Take 1 packet by mouth every morning   Yes Unknown, Entered By History   psyllium (METAMUCIL/KONSYL) 58.6 % powder Take 2 teaspoonful by mouth every morning   Yes Unknown, Entered By History   fluticasone (FLONASE) 50 MCG/ACT nasal spray SHAKE LIQUID AND USE 1 SPRAY IN EACH NOSTRIL DAILY  Patient taking differently: Spray 1 spray into both nostrils daily as needed for rhinitis  20   Doyle Silva MD       Anesthesia Evaluation     .             ROS/MED HX    ENT/Pulmonary:     (+)sleep apnea, allergic rhinitis, , . .   (-) tobacco use and asthma   Neurologic:      (-) seizures, CVA, Neuropathy and  migraines   Cardiovascular:        (-) hypertension, CAD, CAMACHO, arrhythmias, valvular problems/murmurs and dyslipidemia   METS/Exercise Tolerance:  >4 METS   Hematologic:        (-) history of blood clots, anemia and other hematologic disorder   Musculoskeletal:        (-) arthritis   GI/Hepatic:        (-) GERD and liver disease   Renal/Genitourinary:     (+) chronic renal disease (hydronephrosis), type: CRI, Nephrolithiasis ,       Endo:      (-) Type I DM, Type II DM, thyroid disease and obesity   Psychiatric:        (-) psychiatric history   Infectious Disease:        (-) Recent Fever   Malignancy:         Other:                          Physical Exam  Normal systems: cardiovascular, pulmonary and dental    Airway   Mallampati: II  TM distance: >3 FB  Neck ROM: full    Dental     Cardiovascular   Rhythm and rate: regular and normal  (-) no murmur    Pulmonary    breath sounds clear to auscultation            Lab Results   Component Value Date    WBC 8.1 10/14/2020    HGB 15.8 10/14/2020    HCT 45.1 10/14/2020     10/14/2020     10/15/2020    POTASSIUM 4.0 10/15/2020    CHLORIDE 110 (H) 10/15/2020    CO2 28 10/15/2020    BUN 17 10/15/2020    CR 1.67 (H) 10/15/2020    GLC 97 10/15/2020    MICA 8.7 10/15/2020    ALBUMIN 3.5 10/13/2020    PROTTOTAL 6.9 10/13/2020    ALT 25 10/13/2020    AST 15 10/13/2020    ALKPHOS 80 10/13/2020    BILITOTAL 0.5 10/13/2020    LIPASE 88 09/07/2019    INR 1.18 (H) 07/21/2005       Preop Vitals  BP Readings from Last 3 Encounters:   10/15/20 (!) 169/86   10/13/20 134/86   01/24/20 (!) 140/80    Pulse Readings from Last 3 Encounters:   10/15/20 70   10/13/20 78   01/24/20 72      Resp Readings from Last 3 Encounters:   10/15/20 16   10/13/20 16   09/19/19 12    SpO2 Readings from Last 3 Encounters:   10/15/20 99%   10/13/20 98%   01/24/20 96%      Temp Readings from Last 1 Encounters:   10/15/20 36.3  C (97.3  F) (Oral)    Ht Readings from Last 1 Encounters:   10/14/20 1.854  "m (6' 1\")      Wt Readings from Last 1 Encounters:   10/15/20 98.8 kg (217 lb 12.8 oz)    Estimated body mass index is 28.74 kg/m  as calculated from the following:    Height as of this encounter: 1.854 m (6' 1\").    Weight as of this encounter: 98.8 kg (217 lb 12.8 oz).       Anesthesia Plan      History & Physical Review      ASA Status:  2 .    NPO Status:  > 8 hours    Plan for General with Propofol induction. Maintenance will be Balanced.    PONV prophylaxis:  Ondansetron (or other 5HT-3) and Dexamethasone or Solumedrol  Propofol infusion        Postoperative Care  Postoperative pain management:  Multi-modal analgesia.      Consents  Anesthetic plan, risks, benefits and alternatives discussed with:  Patient..                 Radha Rivero MD  "

## 2020-10-15 NOTE — PLAN OF CARE
A&Ox4. VSS on RA ex HTN. Reg diet, tolerated well, NPO after midnight. Ind. Denies pain, N/V. Home BiPap at night. IVF NS + 20 KCL @ 100 infusing L PIV. Urology consult scheduled for AM.

## 2020-10-15 NOTE — DISCHARGE SUMMARY
Patient discharged at 3:58 PM to home.  IV was discontinued. Pain at time of discharge was 0. Belongings returned to patient.  Discharge instructions and medications reviewed with patient.  Patient verbalized understanding and all questions were answered.   At time of discharge, patient condition was stable and left the unit escorted by staff.

## 2020-10-15 NOTE — PLAN OF CARE
Observation goals PRIOR TO DISCHARGE     Comments: -diagnostic tests and consults completed and resulted not met- urology to see  -vital signs normal or at patient baseline -partially met (elevated BP's)  Nurse to notify provider when observation goals have been met and patient is ready for discharge.

## 2020-10-15 NOTE — PROVIDER NOTIFICATION
MD Notification    Notified Person: MD    Notified Person Name: Justin    Notification Date/Time: 2130    Notification Interaction: Amcom paged    Purpose of Notification: Per Justin's note -  would like IVF NS + KCL 20 at 100 ordered, no continuous fluid orders seen in MAR?    Orders Received: Order placed    Comments:

## 2020-10-15 NOTE — PLAN OF CARE
A&O. VSS ex slightly elevated BP. Denies pain. BIPAP overnight. NPO for probable cysto/stent- urology to see.

## 2020-10-15 NOTE — OR NURSING
Spoke with Dr. Butt.  Ok for pt to resume regular diet.  Ok to discharge pt today from urology standpoint.  Per Dr. Butt urology portion of discharge orders are in.

## 2020-10-15 NOTE — ANESTHESIA POSTPROCEDURE EVALUATION
Patient: Jeffrey Conteh    Procedure(s):  CYSTOSCOPY, WITH RIGHT URETERAL STENT PLACEMENT, RIGHT RETROGRADE PYLEOGRAM, RIGHT URETERAL STONE DISPACEMENT WITHOUT REMOVAL    Diagnosis:Right ureteral stone [N20.1]  Diagnosis Additional Information: No value filed.    Anesthesia Type:  General    Note:  Anesthesia Post Evaluation    Patient location during evaluation: PACU  Patient participation: Able to fully participate in evaluation  Level of consciousness: awake and alert  Pain management: adequate  Airway patency: patent  Cardiovascular status: acceptable, hemodynamically stable and blood pressure returned to baseline  Respiratory status: acceptable  Hydration status: acceptable  PONV: none     Anesthetic complications: None          Last vitals:  Vitals:    10/15/20 1250 10/15/20 1300 10/15/20 1310   BP: 136/85 (!) 154/88 (!) 154/89   Pulse: 78 70 75   Resp: 11 12 10   Temp: 36  C (96.8  F)     SpO2:  98% 96%         Electronically Signed By: Radha Rivero MD  October 15, 2020  1:16 PM

## 2020-10-15 NOTE — OP NOTE
Procedure Date: 10/15/2020      PREOPERATIVE DIAGNOSIS:  Right upper ureteral stone with right flank pain.      POSTOPERATIVE DIAGNOSIS:  Right upper ureteral stone with right flank pain.      PROCEDURE:  Cystoscopy, right retrograde pyelogram, right ureteral stone displacement without removal, right stent insertion, fluoroscopic interpretation of images.      SURGEON:  Lucio Butt MD.      ANESTHESIA:  General anesthesia.      INDICATIONS:  Please see previous preop consultation.      DESCRIPTION OF PROCEDURE:  The patient was brought to the operative suite and after induction of anesthesia, was placed in the dorsal lithotomy position with the genitalia prepped and draped in customary fashion.      A timeout was then called.      A 24-[place]er cystoscope was then passed into the penile urethra.  The penile urethra was normal, external sphincter intact.  When viewed from the verumontanum, there was only mild enlargement of the lateral lobes of the prostate.  The bladder was entered without difficulty.  Grade I trabeculation was noted in the bladder.  There was no evidence of neoplasm or stone in the bladder.  Ureteric orifices were in normal position.  Using a 6-[place]er open-ended ureteral catheter, I then guided an 0.035 Sensor wire into the right ureteric orifice and was able to pass it up to the region of the stone.  The stone was then manipulated back into the kidney with the tip of the guidewire.  The guidewire was found to be coiled in the upper pole bhargavi.  I then withdrew the open-ended catheter and passed a 6-[place] 28 cm double-J stent over the wire until one end was coiled in the upper pole bhargavi of the kidney and the other was released in the bladder.  The bladder was then drained with the cystoscope.  The cystoscope removed.  The patient was taken to the recovery room, having tolerated the procedure well.      CONCLUSION:  The patient should be able to go home today, u there are other medical issues  which would preclude that, and then we will plan to do elective ESWL or another form of lithotripsy in the near future.         NORM CHA MD             D: 10/15/2020   T: 10/15/2020   MT: SINDY      Name:     ERIKA THOMPSON   MRN:      [id number]        Account:        [id number]   :      1955           Procedure Date: 10/15/2020      Document: [id number]

## 2020-10-15 NOTE — ANESTHESIA PROCEDURE NOTES
Airway   Date/Time: 10/15/2020 12:14 PM   Patient location during procedure: OR    Staff -   CRNA: Deanne Zepeda APRN CRNA  Performed By: CRNA    Consent for Airway   Urgency: elective    Indications and Patient Condition  Indications for airway management: rosalie-procedural  Induction type:intravenousMask difficulty assessment: 0 - not attempted    Final Airway Details  Final airway type: supraglottic airway    Endotracheal Airway Details   Secured with: plastic tape    Post intubation assessment   Placement verified by: capnometry, equal breath sounds and chest rise   Number of attempts at approach: 1  Number of other approaches attempted: 0  Secured with:plastic tape  Ease of procedure: easy  Dentition: Intact and Unchanged

## 2020-10-16 ENCOUNTER — TELEPHONE (OUTPATIENT)
Dept: FAMILY MEDICINE | Facility: CLINIC | Age: 65
End: 2020-10-16

## 2020-10-16 ENCOUNTER — TELEPHONE (OUTPATIENT)
Dept: UROLOGY | Facility: CLINIC | Age: 65
End: 2020-10-16

## 2020-10-16 NOTE — TELEPHONE ENCOUNTER
Non detailed message left for pt to return call to clinic and ask to speak with a triage nurse.    Haley JOYCE RN  EP Triage

## 2020-10-16 NOTE — TELEPHONE ENCOUNTER
Pt called and left a VM on the nurse line stating he had some questions.  I called him back and no answer.  LM for him to call the nurse line back.  His message said he had questions about his surgery yesterday with Dr. Butt.  NAKIA Mann CMA

## 2020-10-16 NOTE — TELEPHONE ENCOUNTER
Pt was discharged from [facility] on 10/15 after being treated for Acute Kidney Injury (H). Plesae call the pt. Thank you.  Calista Lopez,

## 2020-10-17 ENCOUNTER — NURSE TRIAGE (OUTPATIENT)
Dept: NURSING | Facility: CLINIC | Age: 65
End: 2020-10-17

## 2020-10-17 ENCOUNTER — OFFICE VISIT (OUTPATIENT)
Dept: URGENT CARE | Facility: URGENT CARE | Age: 65
End: 2020-10-17
Payer: COMMERCIAL

## 2020-10-17 VITALS
WEIGHT: 220 LBS | HEART RATE: 79 BPM | OXYGEN SATURATION: 97 % | DIASTOLIC BLOOD PRESSURE: 95 MMHG | TEMPERATURE: 97.4 F | BODY MASS INDEX: 29.03 KG/M2 | SYSTOLIC BLOOD PRESSURE: 154 MMHG

## 2020-10-17 DIAGNOSIS — K59.00 CONSTIPATION, UNSPECIFIED CONSTIPATION TYPE: Primary | ICD-10-CM

## 2020-10-17 PROCEDURE — 99213 OFFICE O/P EST LOW 20 MIN: CPT

## 2020-10-17 NOTE — TELEPHONE ENCOUNTER
3 days before his surgery he did miralax.     Typically stool pattern is daily if not twice daily.    He had a tiny BM today with a ton of gas. He is also have a liquid stool    No rectal pain    Is feeling full    Constipation is a new problem for the patient    This has been going on for about 2 weeks now.    No vomiting    Has nausea    No abdominal pain    Feeling bloated    COVID 19 Nurse Triage Plan/Patient Instructions    Please be aware that novel coronavirus (COVID-19) may be circulating in the community. If you develop symptoms such as fever, cough, or SOB or if you have concerns about the presence of another infection including coronavirus (COVID-19), please contact your health care provider or visit [email or web address].     Disposition/Instructions    In-Person Visit with provider recommended. Reference Visit Selection Guide.    Thank you for taking steps to prevent the spread of this virus.  o Limit your contact with others.  o Wear a simple mask to cover your cough.  o Wash your hands well and often.    Resources    [facility]: About COVID-19: [email or web address]    CDC: What to Do If You're Sick: www.cdc.gov/coronavirus/2019-ncov/about/steps-when-sick.html    CDC: Ending Home Isolation: www.cdc.gov/coronavirus/2019-ncov/hcp/disposition-in-home-patients.html     CDC: Caring for Someone: www.cdc.gov/coronavirus/2019-ncov/if-you-are-sick/care-for-someone.html     [facility]: Interim Guidance for Hospital Discharge to Home: www.health.[facility].[place].us/diseases/coronavirus/hcp/hospdischarge.pdf    [facility] clinical trials (COVID-19 research studies): clinicalaffairs.[facility]/umn-clinical-trials     Below are the COVID-19 hotlines at the [place] [facility] ([facility]). Interpreters are available.   o For health questions: Call 360-429-2399 or 1-290.208.3312 (7 a.m. to 7 p.m.)  o For questions about schools and childcare: Call 971-066-8242 or 1-614.885.6069 (7 a.m. to 7 p.m.)                Jennifer Landin RN            Reason for Disposition    [1] Uses laxative (e.g., PEG / Miralax. Milk of magnesia) or enema AND [2] > once a month    Additional Information    Negative: Patient sounds very sick or weak to the triager    Negative: [1] Vomiting AND [2] abdomen looks much more swollen than usual    Negative: [1] Vomiting AND [2] contains bile (green color)    Negative: [1] Constant abdominal pain AND [2] present > 2 hours    Negative: [1] Rectal pain or fullness from fecal impaction (rectum full of stool) AND [2] NOT better after SITZ bath, suppository or enema    Negative: [1] Intermittent mild abdominal pain AND [2] fever    Negative: Abdomen is more swollen than usual    Negative: Last bowel movement (BM) > 4 days ago    Negative: Leaking stool    Negative: Unable to have a bowel movement (BM) without manually removing stool (using finger to pull out stool or perform disimpaction)    Negative: Unable to have a bowel movement (BM) without laxative or enema    Negative: [1] Constipation persists > 1 week AND [2] no improvement after using CARE ADVICE    Negative: [1] Weight loss > 10 pounds (5 kg) AND [2] not dieting    Negative: Pencil-like, narrow stools    Negative: Taking new prescription medication    Protocols used: CONSTIPATION-A-AH

## 2020-10-17 NOTE — PROGRESS NOTES
Subjective         HPI   Presents to Follow-up constipation that continues to bother him s/p recent RIGHT ureteral stone causing CHUCK and now s/p cystoscopy with RIGHT ureteral stent placed 10-.  He started to note problems 10 days ago prior to knowing he had a kidney stone.  Started Miralax once daily with minimal results.  Since stent placement- has used it now twice daily with small results.  Called nurseline to discuss additional options.  Presents to  for next steps.  No N/V.  No fever.      Patient Active Problem List   Diagnosis     Sleep apnea     Diverticulitis of colon     Hyperlipidemia LDL goal <130     Neck arthritis     Rosacea     Right Achilles tendinitis     Acute kidney injury (H)     Right ureteral stone     Past Surgical History:   Procedure Laterality Date     APPENDECTOMY      2006 along with diverticulities      C TOTAL HIP ARTHROPLASTY      2005     COMBINED CYSTOSCOPY, INSERT STENT URETER(S) Right 10/15/2020    Procedure: CYSTOSCOPY, WITH RIGHT URETERAL STENT PLACEMENT, RIGHT RETROGRADE PYLEOGRAM, RIGHT URETERAL STONE DISPACEMENT WITHOUT REMOVAL;  Surgeon: Lucio Butt MD;  Location: [facility] OR     SEPTOPLASTY         Social History     Tobacco Use     Smoking status: Never Smoker     Smokeless tobacco: Never Used   Substance Use Topics     Alcohol use: Yes     Alcohol/week: 0.0 standard drinks     Comment: less than weekly      Family History   Problem Relation Age of Onset     Hypertension Father      Parkinsonism Father      Dementia Father      Cancer Father         Skin     Ovarian Cancer Mother      Cancer Maternal Grandmother         brain tumor      Cancer Maternal Uncle         brain tumor          Current Outpatient Medications   Medication Sig Dispense Refill     calcium carbonate antacid (TUMS ULTRA 1000) 1000 MG CHEW Take 1 chew tab by mouth every morning        Cholecalciferol (VITAMIN D) 1000 UNITS capsule Take 1 capsule by mouth every morning        fluticasone  (FLONASE) 50 MCG/ACT nasal spray SHAKE LIQUID AND USE 1 SPRAY IN EACH NOSTRIL DAILY (Patient taking differently: Spray 1 spray into both nostrils daily as needed for rhinitis ) 48 g 1     ketorolac (TORADOL) 10 MG tablet Take 1 tablet (10 mg) by mouth every 6 hours as needed 10 tablet 0     Magnesium 500 MG CAPS Take 500 mg by mouth every morning        multivitamin, therapeutic with minerals (MULTI-VITAMIN) TABS tablet Take 1 tablet by mouth every morning Senior 55 plus       naproxen (NAPROSYN) 500 MG tablet Take 500 mg by mouth 2 times daily as needed for moderate pain        polyethylene glycol (MIRALAX) 17 g packet Take 1 packet by mouth every morning       diphenhydrAMINE-acetaminophen (TYLENOL PM)  MG tablet Take 1 tablet by mouth nightly as needed for sleep       psyllium (METAMUCIL/KONSYL) 58.6 % powder Take 2 teaspoonful by mouth every morning       No Known Allergies  Recent Labs   Lab Test 10/15/20  0744 10/14/20  1609 10/13/20  1047 09/19/19  1050 09/07/19  0845 05/15/18  1025 06/04/15  0930   LDL  --   --   --  113*  --  116* 163*   HDL  --   --   --  44  --  54 49   TRIG  --   --   --  110  --  70 89   ALT  --   --  25  --  19 29 34   CR 1.67* 1.63* 1.62*  --  0.92 0.93 1.07   GFRESTIMATED 43* 44* 44*  --  89 83 71   GFRESTBLACK 50* 51* 51*  --  >90 >90 86   POTASSIUM 4.0 3.8 4.2  --  3.8 3.9 3.9      BP Readings from Last 3 Encounters:   10/17/20 (!) 154/95   10/15/20 (!) 153/92   10/13/20 134/86    Wt Readings from Last 3 Encounters:   10/17/20 99.8 kg (220 lb)   10/15/20 98.8 kg (217 lb 12.8 oz)   10/13/20 98 kg (216 lb)                    Reviewed and updated as needed this visit by Provider         Review of Systems   ROS COMP: Constitutional, HEENT, cardiovascular, pulmonary, GI, , musculoskeletal, neuro, skin, endocrine and psych systems are negative, except as otherwise noted.      Objective    BP (!) 154/95   Pulse 79   Temp 97.4  F (36.3  C) (Tympanic)   Wt 99.8 kg (220 lb)    SpO2 97%   BMI 29.03 kg/m      Physical Exam   GENERAL: healthy, alert and no distress  EYES: Eyes grossly normal to inspection, PERRL and conjunctivae and sclerae normal  ABDOMEN: soft, nontender, no hepatosplenomegaly, no masses and bowel sounds normal  MS: no gross musculoskeletal defects noted, no edema  SKIN: no suspicious lesions or rashes  NEURO: Normal strength and tone, mentation intact and speech normal  PSYCH: mentation appears normal, affect normal/bright      Assessment & Plan     1. Constipation, unspecified constipation type    Discussed either continuing with Miralax bid with okay to titrate to desired effects or doing a tap-water enema at home to resolve more quickly.  Continue with increased fluids and fiber to maintain good bowel regimen.  All questions answered.  Patient voices understanding of recommendations.    Keily Jaimes MD  [facility]

## 2020-10-19 DIAGNOSIS — N20.0 RIGHT RENAL STONE: Primary | ICD-10-CM

## 2020-10-19 RX ORDER — CEFAZOLIN SODIUM 1 G
1 VIAL (EA) INJECTION SEE ADMIN INSTRUCTIONS
Status: CANCELLED | OUTPATIENT
Start: 2020-10-19

## 2020-10-20 ENCOUNTER — OFFICE VISIT (OUTPATIENT)
Dept: FAMILY MEDICINE | Facility: CLINIC | Age: 65
End: 2020-10-20
Payer: COMMERCIAL

## 2020-10-20 VITALS
BODY MASS INDEX: 28.36 KG/M2 | HEIGHT: 73 IN | TEMPERATURE: 97.5 F | WEIGHT: 214 LBS | OXYGEN SATURATION: 98 % | SYSTOLIC BLOOD PRESSURE: 136 MMHG | DIASTOLIC BLOOD PRESSURE: 82 MMHG | HEART RATE: 83 BPM

## 2020-10-20 DIAGNOSIS — N13.30 HYDRONEPHROSIS, UNSPECIFIED HYDRONEPHROSIS TYPE: ICD-10-CM

## 2020-10-20 DIAGNOSIS — Z11.59 ENCOUNTER FOR SCREENING FOR OTHER VIRAL DISEASES: Primary | ICD-10-CM

## 2020-10-20 DIAGNOSIS — N20.1 RIGHT URETERAL STONE: Primary | ICD-10-CM

## 2020-10-20 DIAGNOSIS — N17.9 ACUTE KIDNEY INJURY (H): ICD-10-CM

## 2020-10-20 DIAGNOSIS — R82.90 NONSPECIFIC FINDING ON EXAMINATION OF URINE: ICD-10-CM

## 2020-10-20 PROBLEM — N20.0 RIGHT RENAL STONE: Status: ACTIVE | Noted: 2020-10-20

## 2020-10-20 LAB
ALBUMIN UR-MCNC: 30 MG/DL
APPEARANCE UR: CLEAR
BACTERIA #/AREA URNS HPF: ABNORMAL /HPF
BILIRUB UR QL STRIP: NEGATIVE
COLOR UR AUTO: YELLOW
GLUCOSE UR STRIP-MCNC: NEGATIVE MG/DL
HGB UR QL STRIP: ABNORMAL
KETONES UR STRIP-MCNC: NEGATIVE MG/DL
LEUKOCYTE ESTERASE UR QL STRIP: ABNORMAL
NITRATE UR QL: NEGATIVE
PH UR STRIP: 7 PH (ref 5–7)
RBC #/AREA URNS AUTO: ABNORMAL /HPF
SOURCE: ABNORMAL
SP GR UR STRIP: 1.02 (ref 1–1.03)
UROBILINOGEN UR STRIP-ACNC: 0.2 EU/DL (ref 0.2–1)
WBC #/AREA URNS AUTO: ABNORMAL /HPF

## 2020-10-20 PROCEDURE — 81001 URINALYSIS AUTO W/SCOPE: CPT | Performed by: FAMILY MEDICINE

## 2020-10-20 PROCEDURE — 87086 URINE CULTURE/COLONY COUNT: CPT | Performed by: FAMILY MEDICINE

## 2020-10-20 PROCEDURE — 36415 COLL VENOUS BLD VENIPUNCTURE: CPT | Performed by: FAMILY MEDICINE

## 2020-10-20 PROCEDURE — 80048 BASIC METABOLIC PNL TOTAL CA: CPT | Performed by: FAMILY MEDICINE

## 2020-10-20 PROCEDURE — 99495 TRANSJ CARE MGMT MOD F2F 14D: CPT | Performed by: FAMILY MEDICINE

## 2020-10-20 ASSESSMENT — MIFFLIN-ST. JEOR: SCORE: 1819.58

## 2020-10-20 NOTE — PROGRESS NOTES
Subjective     Jeffrey Conteh is a 65 year old male who presents to clinic today for the following health issues:    HPI           Hospital Follow-up Visit:    Hospital/Nursing Home/IP Rehab Facility: [facility] [place] [facility]  Date of Admission: 10/14/2020  Date of Discharge: 10/15/2020  Reason(s) for Admission: Right obstructing nephrolithiasis  Hydronephrosis, Right with CHUCK      Was your hospitalization related to COVID-19? No   Problems taking medications regularly:  None  Medication changes since discharge: None  Problems adhering to non-medication therapy:  None    Summary of hospitalization:  [facility] discharge summary reviewed  Diagnostic Tests/Treatments reviewed.  Follow up needed: urolgy  Other Healthcare Providers Involved in Patient s Care:         None  Update since discharge: stable.       Post Discharge Medication Reconciliation: discharge medications reconciled, continue medications without change.  Plan of care communicated with patient           Review of Systems   Constitutional, HEENT, cardiovascular, pulmonary, GI, , musculoskeletal, neuro, skin, endocrine and psych systems are negative, except as otherwise noted.      Objective    There were no vitals taken for this visit.  There is no height or weight on file to calculate BMI.  Physical Exam   GENERAL: healthy, alert and no distress  HENT: ear canals and TM's normal, nose and mouth without ulcers or lesions  NECK: no adenopathy, no asymmetry, masses, or scars and thyroid normal to palpation  RESP: lungs clear to auscultation - no rales, rhonchi or wheezes  CV: regular rate and rhythm, normal S1 S2, no S3 or S4, no murmur, click or rub, no peripheral edema and peripheral pulses strong  ABDOMEN: soft, nontender, no hepatosplenomegaly, no masses and bowel sounds normal  MS: no gross musculoskeletal defects noted, no edema  No CVA tenderness          Assessment & Plan     Right ureteral stone  Patient kidney functions are elevated  "we will recheck that to make sure they are stable.  He is currently asymptomatic no pain.  His constipation is much improved.  He has an upcoming appointment with urology for evaluation.  - Basic metabolic panel  - UA with Microscopic reflex to Culture    Acute kidney injury (H)  Most likely due to kidney stone.    Hydronephrosis, unspecified hydronephrosis type  Upcoming appointment in the urology for further evaluation.  - Basic metabolic panel    Nonspecific finding on examination of urine  We will culture the urine to rule out any underlying infection.  Slight elevated WBCs which could be nonspecific however once culture is reviewed will make further recommendations.  - Urine Culture Aerobic Bacterial     BMI:   Estimated body mass index is 28.23 kg/m  as calculated from the following:    Height as of this encounter: 1.854 m (6' 1\").    Weight as of this encounter: 97.1 kg (214 lb).     Doyle Silva MD  [facility]    "

## 2020-10-21 LAB
ANION GAP SERPL CALCULATED.3IONS-SCNC: 5 MMOL/L (ref 3–14)
BUN SERPL-MCNC: 15 MG/DL (ref 7–30)
CALCIUM SERPL-MCNC: 9.7 MG/DL (ref 8.5–10.1)
CHLORIDE SERPL-SCNC: 107 MMOL/L (ref 94–109)
CO2 SERPL-SCNC: 28 MMOL/L (ref 20–32)
CREAT SERPL-MCNC: 1.04 MG/DL (ref 0.66–1.25)
GFR SERPL CREATININE-BSD FRML MDRD: 76 ML/MIN/{1.73_M2}
GLUCOSE SERPL-MCNC: 79 MG/DL (ref 70–99)
POTASSIUM SERPL-SCNC: 4.3 MMOL/L (ref 3.4–5.3)
SODIUM SERPL-SCNC: 140 MMOL/L (ref 133–144)

## 2020-10-22 LAB
BACTERIA SPEC CULT: NO GROWTH
Lab: NORMAL
SPECIMEN SOURCE: NORMAL

## 2020-10-27 ENCOUNTER — OFFICE VISIT (OUTPATIENT)
Dept: FAMILY MEDICINE | Facility: CLINIC | Age: 65
End: 2020-10-27
Payer: COMMERCIAL

## 2020-10-27 VITALS
OXYGEN SATURATION: 99 % | HEART RATE: 77 BPM | TEMPERATURE: 96.4 F | DIASTOLIC BLOOD PRESSURE: 88 MMHG | SYSTOLIC BLOOD PRESSURE: 140 MMHG | WEIGHT: 217 LBS | BODY MASS INDEX: 28.63 KG/M2

## 2020-10-27 DIAGNOSIS — N17.9 ACUTE KIDNEY INJURY (H): ICD-10-CM

## 2020-10-27 DIAGNOSIS — Z01.818 PRE-OPERATIVE EXAMINATION: ICD-10-CM

## 2020-10-27 DIAGNOSIS — N20.1 RIGHT URETERAL STONE: Primary | ICD-10-CM

## 2020-10-27 DIAGNOSIS — R03.0 ELEVATED BP WITHOUT DIAGNOSIS OF HYPERTENSION: ICD-10-CM

## 2020-10-27 PROCEDURE — 99214 OFFICE O/P EST MOD 30 MIN: CPT | Performed by: FAMILY MEDICINE

## 2020-10-27 NOTE — PROGRESS NOTES
[address] [address]  Phone: [phone]  Primary Provider: Jadon Silva  Pre-op Performing Provider: JADON SILVA    PREOPERATIVE EVALUATION:  Today's date: 10/27/2020    Jeffrey Conteh is a 65 year old male who presents for a preoperative evaluation.    Surgical Information:  Surgery/Procedure: lithotrispy  Surgery Location: [facility]  Surgeon: Filomena  Surgery Date: 11/10/2020  Time of Surgery: 11am  Where patient plans to recover: At home with family  Fax number for surgical facility: Note does not need to be faxed, will be available electronically in Epic.    Type of Anesthesia Anticipated: General    Subjective     HPI related to upcoming procedure:     Preop Questions 10/27/2020   1. Have you ever had a heart attack or stroke? No   2. Have you ever had surgery on your heart or blood vessels, such as a stent placement, a coronary artery bypass, or surgery on an artery in your head, neck, heart, or legs? No   3. Do you have chest pain with activity? No   4. Do you have a history of  heart failure? No   5. Do you currently have a cold, bronchitis or symptoms of other infection? No   6. Do you have a cough, shortness of breath, or wheezing? No   7. Do you or anyone in your family have previous history of blood clots? No   8. Do you or does anyone in your family have a serious bleeding problem such as prolonged bleeding following surgeries or cuts? No   9. Have you ever had problems with anemia or been told to take iron pills? No   10. Have you had any abnormal blood loss such as black, tarry or bloody stools? No   11. Have you ever had a blood transfusion? No   12. Are you willing to have a blood transfusion if it is medically needed before, during, or after your surgery? Yes   13. Have you or any of your relatives ever had problems with anesthesia? No   14. Do you have sleep apnea, excessive snoring or daytime drowsiness? YES -      14a. Do you  have a CPAP machine? Yes   15. Do you have any artifical heart valves or other implanted medical devices like a pacemaker, defibrillator, or continuous glucose monitor? No   16. Do you have artificial joints? YES -    17. Are you allergic to latex? No       Health Care Directive:  No information    Preoperative Review of :  No concerns        Review of Systems  CONSTITUTIONAL: NEGATIVE for fever, chills, change in weight  ENT/MOUTH: NEGATIVE for ear, mouth and throat problems  RESP: NEGATIVE for significant cough or SOB  CV: NEGATIVE for chest pain, palpitations or peripheral edema    Patient Active Problem List    Diagnosis Date Noted     Right renal stone 10/20/2020     Priority: Medium     Added automatically from request for surgery [id number]       Acute kidney injury (H) 10/14/2020     Priority: Medium     Right ureteral stone 10/14/2020     Priority: Medium     Right Achilles tendinitis 05/05/2017     Priority: Medium     Sleep apnea 06/04/2015     Priority: Medium     Diverticulitis of colon 06/04/2015     Priority: Medium     Hyperlipidemia LDL goal <130 06/04/2015     Priority: Medium     Neck arthritis 06/04/2015     Priority: Medium     Rosacea 06/04/2015     Priority: Medium      No past medical history on file.  Past Surgical History:   Procedure Laterality Date     APPENDECTOMY      2006 along with diverticulities      C TOTAL HIP ARTHROPLASTY      2005     COMBINED CYSTOSCOPY, INSERT STENT URETER(S) Right 10/15/2020    Procedure: CYSTOSCOPY, WITH RIGHT URETERAL STENT PLACEMENT, RIGHT RETROGRADE PYLEOGRAM, RIGHT URETERAL STONE DISPACEMENT WITHOUT REMOVAL;  Surgeon: Lucio Butt MD;  Location: [facility] OR     SEPTOPLASTY       Current Outpatient Medications   Medication Sig Dispense Refill     calcium carbonate antacid (TUMS ULTRA 1000) 1000 MG CHEW Take 1 chew tab by mouth every morning        Cholecalciferol (VITAMIN D) 1000 UNITS capsule Take 1 capsule by mouth every morning         diphenhydrAMINE-acetaminophen (TYLENOL PM)  MG tablet Take 1 tablet by mouth nightly as needed for sleep       Magnesium 500 MG CAPS Take 500 mg by mouth every morning        multivitamin, therapeutic with minerals (MULTI-VITAMIN) TABS tablet Take 1 tablet by mouth every morning Senior 55 plus       naproxen (NAPROSYN) 500 MG tablet Take 500 mg by mouth 2 times daily as needed for moderate pain        polyethylene glycol (MIRALAX) 17 g packet Take 1 packet by mouth every morning       psyllium (METAMUCIL/KONSYL) 58.6 % powder Take 2 teaspoonful by mouth every morning       fluticasone (FLONASE) 50 MCG/ACT nasal spray SHAKE LIQUID AND USE 1 SPRAY IN EACH NOSTRIL DAILY (Patient taking differently: Spray 1 spray into both nostrils daily as needed for rhinitis ) 48 g 1     ketorolac (TORADOL) 10 MG tablet Take 1 tablet (10 mg) by mouth every 6 hours as needed (Patient not taking: Reported on 10/27/2020) 10 tablet 0       No Known Allergies     Social History     Tobacco Use     Smoking status: Never Smoker     Smokeless tobacco: Never Used   Substance Use Topics     Alcohol use: Yes     Alcohol/week: 0.0 standard drinks     Comment: less than weekly      Family History   Problem Relation Age of Onset     Hypertension Father      Parkinsonism Father      Dementia Father      Cancer Father         Skin     Ovarian Cancer Mother      Cancer Maternal Grandmother         brain tumor      Cancer Maternal Uncle         brain tumor      History   Drug Use No         Objective     BP (!) 160/92   Pulse 77   Temp 96.4  F (35.8  C) (Tympanic)   Wt 98.4 kg (217 lb)   SpO2 99%   BMI 28.63 kg/m      Physical Exam    GENERAL APPEARANCE: healthy, alert and no distress     EYES: EOMI,  PERRL     HENT: ear canals and TM's normal and nose and mouth without ulcers or lesions     NECK: no adenopathy, no asymmetry, masses, or scars and thyroid normal to palpation     RESP: lungs clear to auscultation - no rales, rhonchi or  wheezes     CV: regular rates and rhythm, normal S1 S2, no S3 or S4 and no murmur, click or rub     ABDOMEN:  soft, nontender, no HSM or masses and bowel sounds normal     MS: extremities normal- no gross deformities noted, no evidence of inflammation in joints, FROM in all extremities.     SKIN: no suspicious lesions or rashes     NEURO: Normal strength and tone, sensory exam grossly normal, mentation intact and speech normal     PSYCH: mentation appears normal. and affect normal/bright     LYMPHATICS: No cervical adenopathy    Recent Labs   Lab Test 10/20/20  1556 10/15/20  0744 10/14/20  1609 10/13/20  1047   HGB  --   --  15.8 15.1   PLT  --   --  341 310    141 140 139   POTASSIUM 4.3 4.0 3.8 4.2   CR 1.04 1.67* 1.63* 1.62*        Diagnostics:  Recent Results (from the past 240 hour(s))   Basic metabolic panel    Collection Time: 10/20/20  3:56 PM   Result Value Ref Range    Sodium 140 133 - 144 mmol/L    Potassium 4.3 3.4 - 5.3 mmol/L    Chloride 107 94 - 109 mmol/L    Carbon Dioxide 28 20 - 32 mmol/L    Anion Gap 5 3 - 14 mmol/L    Glucose 79 70 - 99 mg/dL    Urea Nitrogen 15 7 - 30 mg/dL    Creatinine 1.04 0.66 - 1.25 mg/dL    GFR Estimate 76 >60 mL/min/[1.73_m2]    GFR Estimate If Black 88 >60 mL/min/[1.73_m2]    Calcium 9.7 8.5 - 10.1 mg/dL   UA with Microscopic reflex to Culture    Collection Time: 10/20/20  3:57 PM    Specimen: Midstream Urine   Result Value Ref Range    Color Urine Yellow     Appearance Urine Clear     Glucose Urine Negative NEG^Negative mg/dL    Bilirubin Urine Negative NEG^Negative    Ketones Urine Negative NEG^Negative mg/dL    Specific Gravity Urine 1.025 1.003 - 1.035    pH Urine 7.0 5.0 - 7.0 pH    Protein Albumin Urine 30 (A) NEG^Negative mg/dL    Urobilinogen Urine 0.2 0.2 - 1.0 EU/dL    Nitrite Urine Negative NEG^Negative    Blood Urine Large (A) NEG^Negative    Leukocyte Esterase Urine Small (A) NEG^Negative    Source Midstream Urine     WBC Urine 5-10 (A) OTO5^0 - 5  /HPF    RBC Urine  (A) OTO2^O - 2 /HPF    Bacteria Urine Moderate (A) NEG^Negative /HPF   Urine Culture Aerobic Bacterial    Collection Time: 10/20/20  3:57 PM    Specimen: Midstream Urine   Result Value Ref Range    Specimen Description Midstream Urine     Special Requests Specimen received in preservative     Culture Micro No growth       No EKG required, no history of coronary heart disease, significant arrhythmia, peripheral arterial disease or other structural heart disease.    Revised Cardiac Risk Index (RCRI):  The patient has the following serious cardiovascular risks for perioperative complications:   - No serious cardiac risks = 0 points     RCRI Interpretation: 0 points: Class I (very low risk - 0.4% complication rate)             Assessment & Plan   The proposed surgical procedure is considered LOW risk.    Right ureteral stone      Acute kidney injury (H)  Patient kidney functions are normal.    Pre-operative examination      Elevated BP without diagnosis of hypertension  Slight elevated blood pressure.  Recheck was slight better.  He is advised to follow-up after surgery so that we can monitor if needed we can start him on some medication.  Continue to work on your diet and exercise        Risks and Recommendations:  The patient has the following additional risks and recommendations for perioperative complications:   - No identified additional risk factors other than previously addressed    Medication Instructions:  Patient is on no chronic medications    RECOMMENDATION:  APPROVAL GIVEN to proceed with proposed procedure, without further diagnostic evaluation.    Signed Electronically by: Doyle Silva MD    Copy of this evaluation report is provided to requesting physician.    [facility] Guidelines    Revised Cardiac Risk Index

## 2020-11-06 DIAGNOSIS — Z11.59 ENCOUNTER FOR SCREENING FOR OTHER VIRAL DISEASES: ICD-10-CM

## 2020-11-06 PROCEDURE — U0003 INFECTIOUS AGENT DETECTION BY NUCLEIC ACID (DNA OR RNA); SEVERE ACUTE RESPIRATORY SYNDROME CORONAVIRUS 2 (SARS-COV-2) (CORONAVIRUS DISEASE [COVID-19]), AMPLIFIED PROBE TECHNIQUE, MAKING USE OF HIGH THROUGHPUT TECHNOLOGIES AS DESCRIBED BY CMS-2020-01-R: HCPCS | Mod: 90 | Performed by: PATHOLOGY

## 2020-11-06 PROCEDURE — 99000 SPECIMEN HANDLING OFFICE-LAB: CPT | Performed by: PATHOLOGY

## 2020-11-07 LAB
SARS-COV-2 RNA SPEC QL NAA+PROBE: NOT DETECTED
SPECIMEN SOURCE: NORMAL

## 2020-11-10 ENCOUNTER — ANESTHESIA (OUTPATIENT)
Dept: SURGERY | Facility: CLINIC | Age: 65
End: 2020-11-10
Payer: COMMERCIAL

## 2020-11-10 ENCOUNTER — ANESTHESIA EVENT (OUTPATIENT)
Dept: SURGERY | Facility: CLINIC | Age: 65
End: 2020-11-10
Payer: COMMERCIAL

## 2020-11-10 ENCOUNTER — TELEPHONE (OUTPATIENT)
Dept: UROLOGY | Facility: CLINIC | Age: 65
End: 2020-11-10

## 2020-11-10 ENCOUNTER — APPOINTMENT (OUTPATIENT)
Dept: GENERAL RADIOLOGY | Facility: CLINIC | Age: 65
End: 2020-11-10
Attending: UROLOGY
Payer: COMMERCIAL

## 2020-11-10 ENCOUNTER — HOSPITAL ENCOUNTER (OUTPATIENT)
Facility: CLINIC | Age: 65
Discharge: HOME | End: 2020-11-10
Attending: UROLOGY | Admitting: UROLOGY
Payer: COMMERCIAL

## 2020-11-10 VITALS
HEIGHT: 73 IN | RESPIRATION RATE: 12 BRPM | DIASTOLIC BLOOD PRESSURE: 91 MMHG | BODY MASS INDEX: 28.81 KG/M2 | SYSTOLIC BLOOD PRESSURE: 158 MMHG | TEMPERATURE: 96.6 F | OXYGEN SATURATION: 97 % | HEART RATE: 63 BPM | WEIGHT: 217.4 LBS

## 2020-11-10 DIAGNOSIS — N20.0 RIGHT RENAL STONE: ICD-10-CM

## 2020-11-10 DIAGNOSIS — N20.0 RIGHT RENAL STONE: Primary | ICD-10-CM

## 2020-11-10 PROCEDURE — 999N000138 HC STATISTIC PRE-PROCEDURE ASSESSMENT I: Performed by: UROLOGY

## 2020-11-10 PROCEDURE — 250N000011 HC RX IP 250 OP 636: Performed by: UROLOGY

## 2020-11-10 PROCEDURE — 74019 RADEX ABDOMEN 2 VIEWS: CPT

## 2020-11-10 PROCEDURE — 360N000027 HC SURGERY LEVEL 4 EA 15 ADDTL MIN: Performed by: UROLOGY

## 2020-11-10 PROCEDURE — 370N000002 HC ANESTHESIA TECHNICAL FEE, EACH ADDTL 15 MIN: Performed by: UROLOGY

## 2020-11-10 PROCEDURE — 360N000026 HC SURGERY LEVEL 4 1ST 30 MIN: Performed by: UROLOGY

## 2020-11-10 PROCEDURE — 370N000001 HC ANESTHESIA TECHNICAL FEE, 1ST 30 MIN: Performed by: UROLOGY

## 2020-11-10 PROCEDURE — 258N000003 HC RX IP 258 OP 636: Performed by: REGISTERED NURSE

## 2020-11-10 PROCEDURE — 250N000009 HC RX 250: Performed by: REGISTERED NURSE

## 2020-11-10 PROCEDURE — 250N000011 HC RX IP 250 OP 636: Performed by: REGISTERED NURSE

## 2020-11-10 PROCEDURE — 50590 FRAGMENTING OF KIDNEY STONE: CPT | Mod: RT | Performed by: UROLOGY

## 2020-11-10 PROCEDURE — 761N000007 HC RECOVERY PHASE 2 EACH 15 MINS: Performed by: UROLOGY

## 2020-11-10 PROCEDURE — 761N000001 HC RECOVERY PHASE 1 LEVEL 1 FIRST HR: Performed by: UROLOGY

## 2020-11-10 PROCEDURE — 250N000003 HC SEVOFLURANE, EA 15 MIN: Performed by: UROLOGY

## 2020-11-10 PROCEDURE — 999N000094 HC STATISTIC LITHOTRIPSY IDENTIFIER: Performed by: UROLOGY

## 2020-11-10 RX ORDER — HYDRALAZINE HYDROCHLORIDE 20 MG/ML
2.5-5 INJECTION INTRAMUSCULAR; INTRAVENOUS EVERY 10 MIN PRN
Status: DISCONTINUED | OUTPATIENT
Start: 2020-11-10 | End: 2020-11-10 | Stop reason: HOSPADM

## 2020-11-10 RX ORDER — DEXAMETHASONE SODIUM PHOSPHATE 4 MG/ML
INJECTION, SOLUTION INTRA-ARTICULAR; INTRALESIONAL; INTRAMUSCULAR; INTRAVENOUS; SOFT TISSUE PRN
Status: DISCONTINUED | OUTPATIENT
Start: 2020-11-10 | End: 2020-11-10

## 2020-11-10 RX ORDER — NALOXONE HYDROCHLORIDE 0.4 MG/ML
.1-.4 INJECTION, SOLUTION INTRAMUSCULAR; INTRAVENOUS; SUBCUTANEOUS
Status: DISCONTINUED | OUTPATIENT
Start: 2020-11-10 | End: 2020-11-10 | Stop reason: HOSPADM

## 2020-11-10 RX ORDER — SODIUM CHLORIDE, SODIUM LACTATE, POTASSIUM CHLORIDE, CALCIUM CHLORIDE 600; 310; 30; 20 MG/100ML; MG/100ML; MG/100ML; MG/100ML
INJECTION, SOLUTION INTRAVENOUS CONTINUOUS
Status: DISCONTINUED | OUTPATIENT
Start: 2020-11-10 | End: 2020-11-10 | Stop reason: HOSPADM

## 2020-11-10 RX ORDER — CEFAZOLIN SODIUM 1 G/3ML
1 INJECTION, POWDER, FOR SOLUTION INTRAMUSCULAR; INTRAVENOUS SEE ADMIN INSTRUCTIONS
Status: DISCONTINUED | OUTPATIENT
Start: 2020-11-10 | End: 2020-11-10 | Stop reason: HOSPADM

## 2020-11-10 RX ORDER — FENTANYL CITRATE 50 UG/ML
INJECTION, SOLUTION INTRAMUSCULAR; INTRAVENOUS PRN
Status: DISCONTINUED | OUTPATIENT
Start: 2020-11-10 | End: 2020-11-10

## 2020-11-10 RX ORDER — LABETALOL HYDROCHLORIDE 5 MG/ML
10 INJECTION, SOLUTION INTRAVENOUS
Status: DISCONTINUED | OUTPATIENT
Start: 2020-11-10 | End: 2020-11-10 | Stop reason: HOSPADM

## 2020-11-10 RX ORDER — LIDOCAINE HYDROCHLORIDE 20 MG/ML
INJECTION, SOLUTION INFILTRATION; PERINEURAL PRN
Status: DISCONTINUED | OUTPATIENT
Start: 2020-11-10 | End: 2020-11-10

## 2020-11-10 RX ORDER — ACETAMINOPHEN 325 MG/1
650 TABLET ORAL EVERY 4 HOURS PRN
Qty: 50 TABLET | Refills: 0 | Status: SHIPPED | OUTPATIENT
Start: 2020-11-10 | End: 2021-03-23

## 2020-11-10 RX ORDER — SODIUM CHLORIDE, SODIUM LACTATE, POTASSIUM CHLORIDE, CALCIUM CHLORIDE 600; 310; 30; 20 MG/100ML; MG/100ML; MG/100ML; MG/100ML
INJECTION, SOLUTION INTRAVENOUS CONTINUOUS PRN
Status: DISCONTINUED | OUTPATIENT
Start: 2020-11-10 | End: 2020-11-10

## 2020-11-10 RX ORDER — PROPOFOL 10 MG/ML
INJECTION, EMULSION INTRAVENOUS PRN
Status: DISCONTINUED | OUTPATIENT
Start: 2020-11-10 | End: 2020-11-10

## 2020-11-10 RX ORDER — FENTANYL CITRATE 50 UG/ML
25-50 INJECTION, SOLUTION INTRAMUSCULAR; INTRAVENOUS
Status: DISCONTINUED | OUTPATIENT
Start: 2020-11-10 | End: 2020-11-10 | Stop reason: HOSPADM

## 2020-11-10 RX ORDER — KETOROLAC TROMETHAMINE 30 MG/ML
30 INJECTION, SOLUTION INTRAMUSCULAR; INTRAVENOUS EVERY 6 HOURS PRN
Status: DISCONTINUED | OUTPATIENT
Start: 2020-11-10 | End: 2020-11-10 | Stop reason: HOSPADM

## 2020-11-10 RX ORDER — CEFAZOLIN SODIUM 2 G/100ML
2 INJECTION, SOLUTION INTRAVENOUS
Status: COMPLETED | OUTPATIENT
Start: 2020-11-10 | End: 2020-11-10

## 2020-11-10 RX ORDER — ONDANSETRON 2 MG/ML
INJECTION INTRAMUSCULAR; INTRAVENOUS PRN
Status: DISCONTINUED | OUTPATIENT
Start: 2020-11-10 | End: 2020-11-10

## 2020-11-10 RX ORDER — DEXAMETHASONE SODIUM PHOSPHATE 4 MG/ML
4 INJECTION, SOLUTION INTRA-ARTICULAR; INTRALESIONAL; INTRAMUSCULAR; INTRAVENOUS; SOFT TISSUE EVERY 10 MIN PRN
Status: DISCONTINUED | OUTPATIENT
Start: 2020-11-10 | End: 2020-11-10 | Stop reason: HOSPADM

## 2020-11-10 RX ORDER — ONDANSETRON 4 MG/1
4 TABLET, ORALLY DISINTEGRATING ORAL EVERY 30 MIN PRN
Status: DISCONTINUED | OUTPATIENT
Start: 2020-11-10 | End: 2020-11-10 | Stop reason: HOSPADM

## 2020-11-10 RX ORDER — ACETAMINOPHEN 325 MG/1
650 TABLET ORAL
Status: DISCONTINUED | OUTPATIENT
Start: 2020-11-10 | End: 2020-11-10 | Stop reason: HOSPADM

## 2020-11-10 RX ORDER — ONDANSETRON 2 MG/ML
4 INJECTION INTRAMUSCULAR; INTRAVENOUS EVERY 30 MIN PRN
Status: DISCONTINUED | OUTPATIENT
Start: 2020-11-10 | End: 2020-11-10 | Stop reason: HOSPADM

## 2020-11-10 RX ORDER — MEPERIDINE HYDROCHLORIDE 25 MG/ML
12.5 INJECTION INTRAMUSCULAR; INTRAVENOUS; SUBCUTANEOUS
Status: DISCONTINUED | OUTPATIENT
Start: 2020-11-10 | End: 2020-11-10 | Stop reason: HOSPADM

## 2020-11-10 RX ORDER — HYDROMORPHONE HYDROCHLORIDE 1 MG/ML
.3-.5 INJECTION, SOLUTION INTRAMUSCULAR; INTRAVENOUS; SUBCUTANEOUS EVERY 10 MIN PRN
Status: DISCONTINUED | OUTPATIENT
Start: 2020-11-10 | End: 2020-11-10 | Stop reason: HOSPADM

## 2020-11-10 RX ORDER — EPHEDRINE SULFATE 50 MG/ML
INJECTION, SOLUTION INTRAMUSCULAR; INTRAVENOUS; SUBCUTANEOUS PRN
Status: DISCONTINUED | OUTPATIENT
Start: 2020-11-10 | End: 2020-11-10

## 2020-11-10 RX ADMIN — PROPOFOL 200 MG: 10 INJECTION, EMULSION INTRAVENOUS at 11:18

## 2020-11-10 RX ADMIN — FENTANYL CITRATE 25 MCG: 50 INJECTION, SOLUTION INTRAMUSCULAR; INTRAVENOUS at 11:25

## 2020-11-10 RX ADMIN — FENTANYL CITRATE 25 MCG: 50 INJECTION, SOLUTION INTRAMUSCULAR; INTRAVENOUS at 11:18

## 2020-11-10 RX ADMIN — ONDANSETRON 4 MG: 2 INJECTION INTRAMUSCULAR; INTRAVENOUS at 11:42

## 2020-11-10 RX ADMIN — LIDOCAINE HYDROCHLORIDE 100 MG: 20 INJECTION, SOLUTION INFILTRATION; PERINEURAL at 11:18

## 2020-11-10 RX ADMIN — Medication 10 MG: at 11:29

## 2020-11-10 RX ADMIN — SODIUM CHLORIDE, POTASSIUM CHLORIDE, SODIUM LACTATE AND CALCIUM CHLORIDE: 600; 310; 30; 20 INJECTION, SOLUTION INTRAVENOUS at 11:15

## 2020-11-10 RX ADMIN — FENTANYL CITRATE 50 MCG: 50 INJECTION, SOLUTION INTRAMUSCULAR; INTRAVENOUS at 12:05

## 2020-11-10 RX ADMIN — MIDAZOLAM 2 MG: 1 INJECTION INTRAMUSCULAR; INTRAVENOUS at 11:15

## 2020-11-10 RX ADMIN — DEXAMETHASONE SODIUM PHOSPHATE 4 MG: 4 INJECTION, SOLUTION INTRA-ARTICULAR; INTRALESIONAL; INTRAMUSCULAR; INTRAVENOUS; SOFT TISSUE at 11:23

## 2020-11-10 RX ADMIN — CEFAZOLIN SODIUM 2 G: 2 INJECTION, SOLUTION INTRAVENOUS at 11:25

## 2020-11-10 ASSESSMENT — ENCOUNTER SYMPTOMS
SEIZURES: 0
DYSRHYTHMIAS: 0

## 2020-11-10 ASSESSMENT — MIFFLIN-ST. JEOR: SCORE: 1835

## 2020-11-10 ASSESSMENT — LIFESTYLE VARIABLES: TOBACCO_USE: 0

## 2020-11-10 NOTE — ANESTHESIA PROCEDURE NOTES
Airway   Date/Time: 11/10/2020 11:32 AM   Patient location during procedure: OR    Staff -   CRNA: Ofelia Ang APRN CRNA  Performed By: CRNA    Consent for Airway   Urgency: elective    Indications and Patient Condition  Indications for airway management: rosalie-procedural  Mask difficulty assessment: 0 - not attempted    Final Airway Details  Final airway type: supraglottic airway    Endotracheal Airway Details   Secured with: paper tape    Post intubation assessment   Placement verified by: capnometry, equal breath sounds and chest rise   Number of attempts at approach: 1  Number of other approaches attempted: 0  Secured with:paper tape  Ease of procedure: easy  Dentition: Intact and Unchanged

## 2020-11-10 NOTE — OP NOTE
Procedure Date: 11/10/2020      PREOPERATIVE DIAGNOSIS:  Right renal stone.      POSTOPERATIVE DIAGNOSIS:  Right renal stone.      PROCEDURE:  Extracorporeal shock wave lithotripsy, right kidney, with fluoroscopic interpretation of images.      INDICATIONS:  This 65-year-old gentleman initially presented with quite severe right-sided flank pain as a result of an approximately 1 cm stone which had dropped into the upper ureter.  He also has a large benign cyst medial to the right kidney causing some deviation of the right kidney.  I placed a stent at that time and was able to displace the stone back into the renal pelvis, which was malrotated.  He returns today for definitive shock wave treatment.      SURGEON:  Norm Cha MD      ANESTHESIA:  General.      DESCRIPTION OF PROCEDURE:  The patient was brought to the operative suite and after induction of anesthesia with the patient on the lithotripsy table, the patient was then positioned at the focal point of the shockwave ellipsoid.  A total of 3200 shocks up to 18 kilovolts were then administered to the stone.  Fragmentation was observed, although only later in the procedure.        At the termination of the procedure, patient was taken to the recovery room.  He will go home today.  He will return to the office in about a week for KUB and probable removal of the stent at that time.         NORM CHA MD             D: 11/10/2020   T: 11/10/2020   MT: RADHA      Name:     ERIKA THOMPSON   MRN:      [id number]        Account:        [id number]   :      1955           Procedure Date: 11/10/2020      Document: [id number]

## 2020-11-10 NOTE — DISCHARGE INSTRUCTIONS
**Because you had anesthesia today and your history of sleep apnea, it is extremely important that you use your CPAP machine for the next 24 hours while napping or sleeping.**    Same Day Surgery Discharge Instructions for  Sedation and General Anesthesia       It's not unusual to feel dizzy, light-headed or faint for up to 24 hours after surgery or while taking pain medication.  If you have these symptoms: sit for a few minutes before standing and have someone assist you when you get up to walk or use the bathroom.      You should rest and relax for the next 24 hours. We recommend you make arrangements to have an adult stay with you for at least 24 hours after your discharge.  Avoid hazardous and strenuous activity.      DO NOT DRIVE any vehicle or operate mechanical equipment for 24 hours following the end of your surgery.  Even though you may feel normal, your reactions may be affected by the medication you have received.      Do not drink alcoholic beverages for 24 hours following surgery.       Slowly progress to your regular diet as you feel able. It's not unusual to feel nauseated and/or vomit after receiving anesthesia.  If you develop these symptoms, drink clear liquids (apple juice, ginger ale, broth, 7-up, etc. ) until you feel better.  If your nausea and vomiting persists for 24 hours, please notify your surgeon.        All narcotic pain medications, along with inactivity and anesthesia, can cause constipation. Drinking plenty of liquids and increasing fiber intake will help.      For any questions of a medical nature, call your surgeon.      Do not make important decisions for 24 hours.      If you had general anesthesia, you may have a sore throat for a couple of days related to the breathing tube used during surgery.  You may use Cepacol lozenges to help with this discomfort.  If it worsens or if you develop a fever, contact your surgeon.       If you feel your pain is not well managed with the pain  medications prescribed by your surgeon, please contact your surgeon's office to let them know so they can address your concerns.       CoVid 19 Information    We want to give you information regarding Covid. Please consult your primary care provider with any questions you might have.     Patient who have symptoms (cough, fever, or shortness of breath), need to isolate for 7 days from when symptoms started OR 72 hours after fever resolves (without fever reducing medications) AND improvement of respiratory symptoms (whichever is longer).      Isolate yourself at home (in own room/own bathroom if possible)    Do Not allow any visitors    Do Not go to work or school    Do Not go to [employer],  centers, shopping, or other public places.    Do Not shake hands.    Avoid close and intimate contact with others (hugging, kissing).    Follow CDC recommendations for household cleaning of frequently touched services.     After the initial 7 days, continue to isolate yourself from household members as much as possible. To continue decrease the risk of community spread and exposure, you and any members of your household should limit activities in public for 14 days after starting home isolation.     You can reference the following CDC link for helpful home isolation/care tips:  https://www.cdc.gov/coronavirus/2019-ncov/downloads/10Things.pdf    Protect Others:    Cover Your Mouth and Nose with a mask, disposable tissue or wash cloth to avoid spreading germs to others.    Wash your hands and face frequently with soap and water    Call Your Primary Doctor If: Breathing difficulty develops or you become worse.    For more information about COVID19 and options for caring for yourself at home, please visit the CDC website at https://www.cdc.gov/coronavirus/2019-ncov/about/steps-when-sick.html  For more options for care at [facility], please visit our website at  [email or web address]      DISCHARGE INSTRUCTIONS FOLLOWING EXTRACORPOREAL SHOCK LITHOTRIPSY (ESWL)      Your stone(s) has been fragmented into many tiny pieces, which must now pass in your urine.  Usually this process is uneventful.  Most fragments pass in the first one or two week, but some may continue to pass for three months or more.  Some pain or discomfort may accompany the passage of these fragments.    To aid in the passage of fragments, drink lots of fluid.  Aim for 1 glass an hour for the next 1 to 2 weeks (2 quarts or more a day).  Most stone patients will benefit from a continued high fluid intake and urine output indefinitely, even after the fragments are gone.  This helps prevent new stone formation.    Strain your urine.  Take the stone fragments to your urologist.  They will have them analyzed to help determine the cause of your stone(s).    You should walk around and resume every day activities.  Activity may help the stone fragments pass.  You should, however, avoid sports or really strenuous exercise for about a week, or at least until there is no more blood in your urine.    You may resume regular diet.    Call your urologist if you have:  a. Persistent severe pain not relieved by oral medications.  b. Fever (over 101 ).  c. Persistent vomiting.    See your urologist as directed.  They will need to take an x-ray to check your progress.  Take your stone fragments to your follow-up appointment.      **If you have questions or concerns about your procedure,   call Dr. Butt at [phone]**

## 2020-11-10 NOTE — ANESTHESIA CARE TRANSFER NOTE
Patient: Jeffrey Conteh    Procedure(s):  LITHOTRIPSY, EXTRACORPOREAL SHOCK WAVE (ESWL)    Diagnosis: Right renal stone [N20.0]  Diagnosis Additional Information: No value filed.    Anesthesia Type:   General     Note:  Airway :Face Mask  Patient transferred to:PACU  Handoff Report: Identifed the Patient, Identified the Reponsible Provider, Reviewed the pertinent medical history, Discussed the surgical course, Reviewed Intra-OP anesthesia mangement and issues during anesthesia, Set expectations for post-procedure period and Allowed opportunity for questions and acknowledgement of understanding      Vitals: (Last set prior to Anesthesia Care Transfer)    CRNA VITALS  11/10/2020 1142 - 11/10/2020 1218      11/10/2020             Pulse:  83    SpO2:  100 %    Resp Rate (observed):  (!) 2                Electronically Signed By: GREG Lopez CRNA  November 10, 2020  12:18 PM

## 2020-11-10 NOTE — TELEPHONE ENCOUNTER
----- Message from Kenna Verdugo sent at 11/10/2020  1:15 PM CST -----  Can you please order KUB? Thanks!  ----- Message -----  From: Lucio Butt MD  Sent: 11/10/2020  11:47 AM CST  To: [facility] - Scheduling Pool    I week kub maybe stent out

## 2020-11-10 NOTE — ANESTHESIA POSTPROCEDURE EVALUATION
Patient: Jeffrey Conteh    Procedure(s):  LITHOTRIPSY, EXTRACORPOREAL SHOCK WAVE (ESWL)    Diagnosis:Right renal stone [N20.0]  Diagnosis Additional Information: No value filed.    Anesthesia Type:  General    Note:  Anesthesia Post Evaluation    Patient location during evaluation: PACU  Patient participation: Able to fully participate in evaluation  Level of consciousness: awake and alert  Pain management: adequate  Airway patency: patent  Cardiovascular status: acceptable  Respiratory status: acceptable and unassisted  Hydration status: acceptable  PONV: none             Last vitals:  Vitals:    11/10/20 1230 11/10/20 1245 11/10/20 1300   BP: (!) 153/95 (!) 164/98 (!) 169/96   Pulse: 67 64 63   Resp: 14 11 9   Temp:  36.1  C (97  F) 35.9  C (96.6  F)   SpO2: 99% 98% 99%         Electronically Signed By: Chloe Bajwa MD  November 10, 2020  1:20 PM

## 2020-11-10 NOTE — ANESTHESIA PREPROCEDURE EVALUATION
Anesthesia Pre-Procedure Evaluation    Patient: Jeffrey Conteh   MRN: [id number] : 1955          Preoperative Diagnosis: Right renal stone [N20.0]    Procedure(s):  LITHOTRIPSY, EXTRACORPOREAL SHOCK WAVE (ESWL)    Past Medical History:   Diagnosis Date     Hypertension      Past Surgical History:   Procedure Laterality Date     APPENDECTOMY       along with diverticulities      C TOTAL HIP ARTHROPLASTY           COMBINED CYSTOSCOPY, INSERT STENT URETER(S) Right 10/15/2020    Procedure: CYSTOSCOPY, WITH RIGHT URETERAL STENT PLACEMENT, RIGHT RETROGRADE PYLEOGRAM, RIGHT URETERAL STONE DISPACEMENT WITHOUT REMOVAL;  Surgeon: Lucio Butt MD;  Location: [facility] OR     SEPTOPLASTY         Anesthesia Evaluation     . Pt has had prior anesthetic.     No history of anesthetic complications          ROS/MED HX    ENT/Pulmonary:     (+)sleep apnea, allergic rhinitis, uses CPAP , . .   (-) tobacco use and asthma   Neurologic:      (-) seizures, CVA, Neuropathy and migraines   Cardiovascular:        (-) hypertension, CAD, CAMACHO, arrhythmias, valvular problems/murmurs and dyslipidemia   METS/Exercise Tolerance:  >4 METS   Hematologic:        (-) history of blood clots, anemia and other hematologic disorder   Musculoskeletal:        (-) arthritis   GI/Hepatic:     (+) Other GI/Hepatic DIVERTICULITIS COLON     (-) GERD and liver disease   Renal/Genitourinary:     (+) Nephrolithiasis ,    Chronic renal disease: hydronephrosis.   Endo:      (-) Type I DM, Type II DM, thyroid disease and obesity   Psychiatric:        (-) psychiatric history   Infectious Disease:        (-) Recent Fever   Malignancy:         Other:                          Physical Exam  Normal systems: cardiovascular, pulmonary and dental    Airway   Mallampati: II  TM distance: >3 FB  Neck ROM: full    Dental     Cardiovascular       Pulmonary             Lab Results   Component Value Date    WBC 8.1 10/14/2020    HGB 15.8 10/14/2020    HCT 45.1  "10/14/2020     10/14/2020     10/20/2020    POTASSIUM 4.3 10/20/2020    CHLORIDE 107 10/20/2020    CO2 28 10/20/2020    BUN 15 10/20/2020    CR 1.04 10/20/2020    GLC 79 10/20/2020    MICA 9.7 10/20/2020    ALBUMIN 3.5 10/13/2020    PROTTOTAL 6.9 10/13/2020    ALT 25 10/13/2020    AST 15 10/13/2020    ALKPHOS 80 10/13/2020    BILITOTAL 0.5 10/13/2020    LIPASE 88 09/07/2019    INR 1.18 (H) 07/21/2005       Preop Vitals  BP Readings from Last 3 Encounters:   11/10/20 (!) 165/96   10/27/20 (!) 140/88   10/20/20 136/82    Pulse Readings from Last 3 Encounters:   11/10/20 65   10/27/20 77   10/20/20 83      Resp Readings from Last 3 Encounters:   11/10/20 18   10/15/20 15   10/13/20 16    SpO2 Readings from Last 3 Encounters:   11/10/20 100%   10/27/20 99%   10/20/20 98%      Temp Readings from Last 1 Encounters:   11/10/20 36.3  C (97.3  F) (Temporal)    Ht Readings from Last 1 Encounters:   11/10/20 1.854 m (6' 1\")      Wt Readings from Last 1 Encounters:   11/10/20 98.6 kg (217 lb 6.4 oz)    Estimated body mass index is 28.68 kg/m  as calculated from the following:    Height as of this encounter: 1.854 m (6' 1\").    Weight as of this encounter: 98.6 kg (217 lb 6.4 oz).       Anesthesia Plan      History & Physical Review  History and physical reviewed and following examination; no interval change.LMA    ASA Status:  2 .    NPO Status:  > 8 hours    Plan for General with Intravenous and Propofol induction. Maintenance will be Balanced.    PONV prophylaxis:  Ondansetron (or other 5HT-3)         Postoperative Care  Postoperative pain management:  Multi-modal analgesia.      Consents  Anesthetic plan, risks, benefits and alternatives discussed with:  Patient..                 Chloe Bajwa MD  "

## 2020-11-11 ENCOUNTER — TELEPHONE (OUTPATIENT)
Dept: UROLOGY | Facility: CLINIC | Age: 65
End: 2020-11-11

## 2020-11-11 DIAGNOSIS — N20.0 KIDNEY STONE: Primary | ICD-10-CM

## 2020-11-11 NOTE — TELEPHONE ENCOUNTER
Orders placed as requested below.  CHUY Jarvis RN      ----- Message from Gem Garcia sent at 11/11/2020 10:22 AM CST -----  Please put in order for Jeffrey TAYLOR was going to wait to call to schedule until after 12pm to give us some time to put in order.  Thanks a million :)

## 2020-11-19 ENCOUNTER — OFFICE VISIT (OUTPATIENT)
Dept: UROLOGY | Facility: CLINIC | Age: 65
End: 2020-11-19
Payer: COMMERCIAL

## 2020-11-19 ENCOUNTER — HOSPITAL ENCOUNTER (OUTPATIENT)
Dept: GENERAL RADIOLOGY | Facility: CLINIC | Age: 65
Discharge: HOME | End: 2020-11-19
Attending: UROLOGY | Admitting: UROLOGY
Payer: COMMERCIAL

## 2020-11-19 VITALS
WEIGHT: 216 LBS | HEART RATE: 94 BPM | HEIGHT: 73 IN | OXYGEN SATURATION: 98 % | DIASTOLIC BLOOD PRESSURE: 98 MMHG | SYSTOLIC BLOOD PRESSURE: 150 MMHG | BODY MASS INDEX: 28.63 KG/M2

## 2020-11-19 DIAGNOSIS — N20.0 KIDNEY STONE: ICD-10-CM

## 2020-11-19 DIAGNOSIS — Z79.2 PROPHYLACTIC ANTIBIOTIC: ICD-10-CM

## 2020-11-19 DIAGNOSIS — N20.0 KIDNEY STONE: Primary | ICD-10-CM

## 2020-11-19 PROCEDURE — 52310 CYSTOSCOPY AND TREATMENT: CPT | Mod: 58 | Performed by: UROLOGY

## 2020-11-19 PROCEDURE — 82365 CALCULUS SPECTROSCOPY: CPT | Mod: 90 | Performed by: UROLOGY

## 2020-11-19 PROCEDURE — 99000 SPECIMEN HANDLING OFFICE-LAB: CPT | Performed by: UROLOGY

## 2020-11-19 PROCEDURE — 74019 RADEX ABDOMEN 2 VIEWS: CPT

## 2020-11-19 RX ORDER — CIPROFLOXACIN 500 MG/1
500 TABLET, FILM COATED ORAL ONCE
Qty: 1 TABLET | Refills: 0 | Status: SHIPPED | OUTPATIENT
Start: 2020-11-19 | End: 2020-11-19

## 2020-11-19 RX ORDER — LIDOCAINE HYDROCHLORIDE 20 MG/ML
JELLY TOPICAL ONCE
Status: COMPLETED | OUTPATIENT
Start: 2020-11-19 | End: 2020-11-19

## 2020-11-19 RX ADMIN — LIDOCAINE HYDROCHLORIDE 5 ML: 20 JELLY TOPICAL at 16:00

## 2020-11-19 ASSESSMENT — MIFFLIN-ST. JEOR: SCORE: 1828.65

## 2020-11-19 ASSESSMENT — PAIN SCALES - GENERAL: PAINLEVEL: NO PAIN (0)

## 2020-11-19 NOTE — PROGRESS NOTES
This very pleasant 65-year-old gentleman who recently had ESWL for stone originating in the right upper ureter but was stented initially because her symptoms were manipulated back into the kidney.  In addition to that he has a large perirenal cyst deviating the right ureter.  The cyst appears to be a Bosniak 1 cyst and otherwise is not causing any symptoms.  After placing a stent we did ESWL.  The stone, after nearly 3200 shocks did fragment.  I am very glad to say he is passed a large number of fragments which we sent for analysis.  A KUB was done today.  This is shown no evidence of residual stone that I can see and observation of the fragments passed would indicate that most of the stone has already passed.    Procedure.  Cystoscopy with removal of right-sided stent.   Surgeon.    Filomena  Anesthesia.  Local anesthesia.  Description.  With the patient in the supine position, with the genital area prepped and draped in the customary fashion, with local anesthetic and the urethra, the flexible cystoscope was Inserted.  The penile urethra is normal.  The external sphincter is intact.  In view of the verumontanum the prostatic urethra was mildly hypoplastic but the bladder was entered without difficulty.  The stent, was seen, grasped, and removed without difficulty.  There were no other remarkable features in the bladder.    Impression.  The patient has done well.  We remove the stent today.  He is already passed a large number of fragments which will be sent for analysis.  This was his first stone and therefore I do not think we need to embark upon extensive metabolic stone evaluation.  I will plan to see him in 3 months for follow-up CT scan abdomen and pelvis without contrast, further discussions about stone analysis and what measures he needs to take to prevent stones forming and we will also have a discussion about the cyst in the kidney and the CT should tell us if this is enlarging or changing in any way.  I  do not anticipate the need for intervention unless he gets symptoms unless we can demonstrate clear evidence of obstruction of the right kidney which at this point we have not yet ascertained.  I went over the entire situation with him in detail today.  I answered all his questions.    Plan.  3 months with CT abdomen and pelvis without contrast and follow-up examination    Time.  Postoperative visit in addition to procedure

## 2020-11-19 NOTE — PATIENT INSTRUCTIONS
"AFTER YOUR CYSTOSCOPY  ?  ?  You have just completed a cystoscopy, or \"cysto\", which allowed your physician to learn more about your bladder (or to remove a stent placed after surgery). We suggest that you continue to avoid caffeine, fruit juice, and alcohol for the next 24 hours, however, you are encouraged to return to your normal activities.  ?  ?  A few things that are considered normal after your cystoscopy:  ?  * small amount of bleeding (or spotting) that clears within the next 24 hours  ?  * slight burning sensation with urination  ?  * sensation of needing to void (urinate) more frequently  ?  * the feeling of \"air\" in your urine  ?  * mild discomfort that is relieved with Tylenol    * bladder spasms  ?  ?  ?  Please contact our office promptly if you:  ?  * develop a fever above 101 degrees  ?  * are unable to urinate  ?  * develop bright red blood that does not stop  ?  * experience severe pain or swelling  ?  ?  ?  And of course, please contact our office with any concerns or questions [phone]  ?    AFTER YOUR CYSTOSCOPY        You have just completed a cystoscopy, or \"cysto\", which allowed your physician to learn more about your bladder (or to remove a stent placed after surgery). We suggest that you continue to avoid caffeine, fruit juice, and alcohol for the next 24 hours, however, you are encouraged to return to your normal activities.         A few things that are considered normal after your cystoscopy:     * Small amount of bleeding (or spotting) that clears within the next 24 hours     * Slight burning sensation with urination     * Sensation to of needing to avoid more frequently     * The feeling of \"air\" in your urine     * Mild discomfort that is relieved with Tylenol        Please contact our office promptly if you:     * Develop a fever above 101 degrees     * Are unable to urinate     * Develop bright red blood that does not stop     * Severe pain or swelling         Please contact " our office with any concerns or questions @[facility].

## 2020-11-19 NOTE — LETTER
11/19/2020       RE: Jeffrey Conteh  [address]  [place] [address]     Dear Colleague,    Thank you for referring your patient, Jeffrey Conteh, to the [facility] [place] [facility]. Please see a copy of my visit note below.    This very pleasant 65-year-old gentleman who recently had ESWL for stone originating in the right upper ureter but was stented initially because her symptoms were manipulated back into the kidney.  In addition to that he has a large perirenal cyst deviating the right ureter.  The cyst appears to be a Bosniak 1 cyst and otherwise is not causing any symptoms.  After placing a stent we did ESWL.  The stone, after nearly 3200 shocks did fragment.  I am very glad to say he is passed a large number of fragments which we sent for analysis.  A KUB was done today.  This is shown no evidence of residual stone that I can see and observation of the fragments passed would indicate that most of the stone has already passed.    Procedure.  Cystoscopy with removal of right-sided stent.   Surgeon.    Filomena  Anesthesia.  Local anesthesia.  Description.  With the patient in the supine position, with the genital area prepped and draped in the customary fashion, with local anesthetic and the urethra, the flexible cystoscope was Inserted.  The penile urethra is normal.  The external sphincter is intact.  In view of the verumontanum the prostatic urethra was mildly hypoplastic but the bladder was entered without difficulty.  The stent, was seen, grasped, and removed without difficulty.  There were no other remarkable features in the bladder.    Impression.  The patient has done well.  We remove the stent today.  He is already passed a large number of fragments which will be sent for analysis.  This was his first stone and therefore I do not think we need to embark upon extensive metabolic stone evaluation.  I will plan to see him in  3 months for follow-up CT scan abdomen and pelvis without contrast, further discussions about stone analysis and what measures he needs to take to prevent stones forming and we will also have a discussion about the cyst in the kidney and the CT should tell us if this is enlarging or changing in any way.  I do not anticipate the need for intervention unless he gets symptoms unless we can demonstrate clear evidence of obstruction of the right kidney which at this point we have not yet ascertained.  I went over the entire situation with him in detail today.  I answered all his questions.    Plan.  3 months with CT abdomen and pelvis without contrast and follow-up examination    Time.  Postoperative visit in addition to procedure      Lucio Butt MD

## 2020-11-23 LAB
APPEARANCE STONE: NORMAL
COMPN STONE: NORMAL
NUMBER STONE: NORMAL
SIZE STONE: NORMAL MM
WT STONE: 91 MG

## 2020-11-30 ENCOUNTER — TELEPHONE (OUTPATIENT)
Dept: UROLOGY | Facility: CLINIC | Age: 65
End: 2020-11-30

## 2020-11-30 DIAGNOSIS — N20.0 CALCULUS OF KIDNEY: Primary | ICD-10-CM

## 2020-12-02 ENCOUNTER — OFFICE VISIT (OUTPATIENT)
Dept: FAMILY MEDICINE | Facility: CLINIC | Age: 65
End: 2020-12-02
Payer: COMMERCIAL

## 2020-12-02 ENCOUNTER — ANCILLARY PROCEDURE (OUTPATIENT)
Dept: GENERAL RADIOLOGY | Facility: CLINIC | Age: 65
End: 2020-12-02
Attending: NURSE PRACTITIONER
Payer: COMMERCIAL

## 2020-12-02 VITALS
HEART RATE: 86 BPM | TEMPERATURE: 97.6 F | OXYGEN SATURATION: 98 % | WEIGHT: 217 LBS | BODY MASS INDEX: 28.76 KG/M2 | SYSTOLIC BLOOD PRESSURE: 126 MMHG | HEIGHT: 73 IN | DIASTOLIC BLOOD PRESSURE: 82 MMHG

## 2020-12-02 DIAGNOSIS — M54.50 ACUTE RIGHT-SIDED LOW BACK PAIN WITHOUT SCIATICA: ICD-10-CM

## 2020-12-02 DIAGNOSIS — M54.50 ACUTE RIGHT-SIDED LOW BACK PAIN WITHOUT SCIATICA: Primary | ICD-10-CM

## 2020-12-02 PROCEDURE — 99214 OFFICE O/P EST MOD 30 MIN: CPT | Performed by: NURSE PRACTITIONER

## 2020-12-02 PROCEDURE — 72100 X-RAY EXAM L-S SPINE 2/3 VWS: CPT | Performed by: RADIOLOGY

## 2020-12-02 RX ORDER — CYCLOBENZAPRINE HCL 5 MG
5-10 TABLET ORAL 3 TIMES DAILY PRN
Qty: 40 TABLET | Refills: 0 | Status: SHIPPED | OUTPATIENT
Start: 2020-12-02 | End: 2021-03-23

## 2020-12-02 RX ORDER — METHYLPREDNISOLONE 4 MG
TABLET, DOSE PACK ORAL
Qty: 21 TABLET | Refills: 0 | Status: SHIPPED | OUTPATIENT
Start: 2020-12-02 | End: 2021-03-23

## 2020-12-02 ASSESSMENT — MIFFLIN-ST. JEOR: SCORE: 1833.19

## 2020-12-02 NOTE — PROGRESS NOTES
"Subjective     Jeffrey Conteh is a 65 year old male who presents to clinic today for the following health issues:    HPI         Back Pain  Onset/Duration: x a month ago   Description:   Location of pain: low back bilateral  Character of pain: sharp  Pain radiation: radiates into the right buttocks  New numbness or weakness in legs, not attributed to pain: no   Intensity: severe, intermittent   Progression of Symptoms: intermittent  History:   Specific cause: none  Pain interferes with job: no  History of back problems: no prior back problems but did have surgery  kidney stone   Any previous MRI or X-rays: None  Sees a specialist for back pain: No  Alleviating factors:   Improved by: none, positional     Precipitating factors:  Worsened by: Bending  Therapies tried and outcome: none    Accompanying Signs & Symptoms:  Risk of Fracture: None  Risk of Cauda Equina: None  Risk of Infection: None  Risk of Cancer: None  Risk of Ankylosing Spondylitis: Onset at age <35, male, AND morning back stiffness  no     HPI: Jeffrey presents today with the complaint of low back pain. This started about 2 or 3 weeks ago. Stable in intensity and character. Feels like a \"shock\" when going from bent over position to sitting / standing. More intense on the right side. Minimal radiation down into upper buttock. ROM intact. No swelling, redness, heat. No limb weakness, bowel / bladder dysfunction, saddle anesthesia. He has a history of DDD in his neck, but he has never had major issues with his lower spine.     Review of Systems   Constitutional, musculoskeletal, neuro systems are negative, except as otherwise noted.      Objective    /82   Pulse 86   Temp 97.6  F (36.4  C) (Tympanic)   Ht 1.854 m (6' 1\")   Wt 98.4 kg (217 lb)   SpO2 98%   BMI 28.63 kg/m    Body mass index is 28.63 kg/m .  Physical Exam   GENERAL: healthy, alert and no distress  MS: no gross musculoskeletal defects noted, no edema  NEURO: Normal strength and " tone, mentation intact and speech normal  BACK: Palpable myofascial knot in lumbosacral region on the right; ROM intact; No overlying redness, swelling, heat; No bony tenderness.    Xray - Personally-reviewed. No evidence of fracture. Disc space narrowing and osteophyte formation noted throughout lumbar region.         Assessment & Plan     Jeffrey was seen today for back pain.    Diagnoses and all orders for this visit:    Acute right-sided low back pain without sciatica  Comment: X-ray helps rule out fracture or other bony issue. Likely DDD noted, however, which is likely a contributor. Will treat conservatively initially: Alternation of ice and heat, NSAIDs, Tylenol, steroid, and gentle stretch / ROM. Flexeril also put on standby at his pharmacy should he want to try that. Offered [facility] referral for acute spine care, but he would like to try these other measures first and follow up for referral if he fails to make progress. Discussed reasons to call or return to clinic. Jeffrey acknowledges and demonstrates understanding of circumstances under which care should be sought urgently or emergently. Follow up as discussed. Discussed risks, benefits, alternatives, potential side effects, and proper administration of new medication / treatment. Agrees with plan of care. All questions answered.   -     cyclobenzaprine (FLEXERIL) 5 MG tablet; Take 1-2 tablets (5-10 mg) by mouth 3 times daily as needed for muscle spasms  -     methylPREDNISolone (MEDROL DOSEPAK) 4 MG tablet therapy pack; Follow Package Directions  -     XR Lumbar Spine 2/3 Views; Future         See Patient Instructions    Return in about 4 weeks (around 12/30/2020) for persistent or worsening symptoms.    Greg Alanis NP  [facility]

## 2020-12-02 NOTE — PATIENT INSTRUCTIONS
Low back pain:  No need for imaging at this point (unless you have red flag symptoms).  Keep active, as able. Resting a sore back too much can delay recovery.  Naproxen or Tylenol for pain.  Ice for pain and inflammation. Heat to relieve spasm and tightness. Alternate ice and heat several times during the day.  Flexeril (muscle relaxant). Please do not drive after taking this.   Medrol Dosepak (steroid anti-inflammatory).  Call or return to clinic if your symptoms persist for 6 weeks, or if they worsen.    Let me know if you want to try [facility] acute spine program.

## 2020-12-03 ENCOUNTER — TELEPHONE (OUTPATIENT)
Dept: FAMILY MEDICINE | Facility: CLINIC | Age: 65
End: 2020-12-03

## 2020-12-03 NOTE — TELEPHONE ENCOUNTER
Left detailed message to take all the pills at one time and taper as directions state.  If any questions to call the clinic back to speak with a triage nurse at [phone].      Haley JOYCE RN  EP Triage

## 2020-12-03 NOTE — TELEPHONE ENCOUNTER
General Call:     Who is calling:  Pt     Reason for Call:  Pt seen Zeke yesterday    Given script for medrol dosepak.   Pt has questions on how is how to take the medication.  All at one time or at different times throughout the day    What are your questions or concerns:  na    Date of last appointment with provider: terry    Okay to leave a detailed message:Yes at Home number on file [phone] (home)

## 2020-12-11 ENCOUNTER — TELEPHONE (OUTPATIENT)
Dept: FAMILY MEDICINE | Facility: CLINIC | Age: 65
End: 2020-12-11

## 2020-12-11 DIAGNOSIS — M54.50 ACUTE RIGHT-SIDED LOW BACK PAIN WITHOUT SCIATICA: Primary | ICD-10-CM

## 2020-12-11 NOTE — TELEPHONE ENCOUNTER
Feels a little different now. Now more toward hip than lower right back. Concerned due to history hip replacement.     Patient is asking for order for PT until 12/30 and if not improving will follow up in clinic with LISA Wilson RN

## 2020-12-11 NOTE — TELEPHONE ENCOUNTER
General Call:     Who is calling:  Pt    Reason for Call:  Pt states still having pain. Wondering next step    What are your questions or concerns:  na    Date of last appointment with provider: na    Okay to leave a detailed message:Yes at Home number on file [phone] (home)

## 2020-12-11 NOTE — TELEPHONE ENCOUNTER
Patient was notified with information noted by provider and agreed with plan.  MELITON RiddleN, RN  [employer] Triage

## 2020-12-11 NOTE — TELEPHONE ENCOUNTER
Please let him know that I ordered PT (acute spine care program) at [facility]. Please give him number to call and set up a visit. Thanks.

## 2020-12-16 ENCOUNTER — THERAPY VISIT (OUTPATIENT)
Dept: PHYSICAL THERAPY | Facility: CLINIC | Age: 65
End: 2020-12-16
Attending: NURSE PRACTITIONER
Payer: COMMERCIAL

## 2020-12-16 DIAGNOSIS — M54.50 ACUTE RIGHT-SIDED LOW BACK PAIN WITHOUT SCIATICA: ICD-10-CM

## 2020-12-16 DIAGNOSIS — M54.50 ACUTE RIGHT-SIDED LOW BACK PAIN, UNSPECIFIED WHETHER SCIATICA PRESENT: ICD-10-CM

## 2020-12-16 PROCEDURE — 97162 PT EVAL MOD COMPLEX 30 MIN: CPT | Mod: GP | Performed by: PHYSICAL THERAPIST

## 2020-12-16 PROCEDURE — 97110 THERAPEUTIC EXERCISES: CPT | Mod: GP | Performed by: PHYSICAL THERAPIST

## 2020-12-16 PROCEDURE — 97530 THERAPEUTIC ACTIVITIES: CPT | Mod: GP | Performed by: PHYSICAL THERAPIST

## 2020-12-16 NOTE — PROGRESS NOTES
[facility] Initial Evaluation  Subjective:  Pt has a sit to stand desk.  Pt had kidney surgery 3 weeks ago to break up a kidney stone.    The history is provided by the patient. No  was used.   Patient Health History  Jeffrey Conteh being seen for LBP into R buttock.     Problem began: 11/16/2020.   Problem occurred: insiduous   Pain is reported as 4/10 on pain scale.  General health as reported by patient is good.  Pertinent medical history includes: history of fractures, sleep disorder/apnea and osteoarthritis.   Red flags:  None as reported by patient.  Medical allergies: none.   Surgeries include:  Orthopedic surgery. Other surgery history details: L STELLA in 2005.    Current medications:  None.    Current occupation is office - corrections.   Primary job tasks include:  Computer work and prolonged sitting.                  Therapist Generated HPI Evaluation         Type of problem:  Lumbar.    This is a new condition.  Condition occurred with:  Insidious onset.  Where condition occurred: for unknown reasons.  Patient reports pain:  Lumbar spine right.  Pain is described as aching (throbbing) and is intermittent.  Radiates to: lateral R hip. Pain timing: depends on activity.  Since onset symptoms are unchanged.  Associated symptoms:  Loss of motion/stiffness. Symptoms are exacerbated by bending (going from bent over position to neutral position)  and relieved by nothing.  Special tests included:  X-ray.    Restrictions due to condition include:  Working in normal job without restrictions.  Barriers include:  None as reported by patient.                        Objective:  System    Physical Exam      Jennifer Lumbar Evaluation    Posture:  Sitting: fair  Standing: good  Lordosis: Reduced  Lateral Shift: no      Movement Loss:  Flexion (Flex): min and pain  Extension (EXT): min  Side Glide R (SG R): min  Side Glide L (SG L): min  Test Movements:        EIL: During: abolishes   After: no better  Mechanical Response: no effect  Repeat EIL: During: abolishes  After: no better  Mechanical Response: no effect        Conclusion: derangement  Principle of Treatment:      Extension: press-ups x 10 reps (or supported standing lumbar ext), every 2-3 hrs                                           ROS    Assessment/Plan:    Patient is a 65 year old male with lumbar complaints.    Patient has the following significant findings with corresponding treatment plan.                Diagnosis 1:  LBP secondary to derangement  Pain -  directional preference exercise and home program  Decreased ROM/flexibility - manual therapy, therapeutic exercise and home program  Decreased function - therapeutic activities and home program  Impaired posture - neuro re-education and home program    Therapy Evaluation Codes:   1) History comprised of:   Personal factors that impact the plan of care:      Work status.    Comorbidity factors that impact the plan of care are:      Implanted device, Osteoarthritis, Sleep disorder/apnea and history of fractures.     Medications impacting care: None.  2) Examination of Body Systems comprised of:   Body structures and functions that impact the plan of care:      Lumbar spine.   Activity limitations that impact the plan of care are:      Bending, Sitting and transitional movements.  3) Clinical presentation characteristics are:   Evolving/Changing.  4) Decision-Making    Moderate complexity using standardized patient assessment instrument and/or measureable assessment of functional outcome.  Cumulative Therapy Evaluation is: Moderate complexity.    Previous and current functional limitations:  (See Goal Flow Sheet for this information)    Short term and Long term goals: (See Goal Flow Sheet for this information)     Communication ability:  Patient appears to be able to clearly communicate and understand verbal and written communication and follow directions correctly.  Treatment  Explanation - The following has been discussed with the patient:   RX ordered/plan of care  Anticipated outcomes  Possible risks and side effects  This patient would benefit from PT intervention to resume normal activities.   Rehab potential is excellent.    Frequency:  1 X week, once daily  Duration:  for 4-6 weeks  Discharge Plan:  Achieve all LTG.  Independent in home treatment program.  Reach maximal therapeutic benefit.    Please refer to the daily flowsheet for treatment today, total treatment time and time spent performing 1:1 timed codes.

## 2020-12-21 ENCOUNTER — THERAPY VISIT (OUTPATIENT)
Dept: PHYSICAL THERAPY | Facility: CLINIC | Age: 65
End: 2020-12-21
Payer: COMMERCIAL

## 2020-12-21 DIAGNOSIS — M54.50 ACUTE RIGHT-SIDED LOW BACK PAIN, UNSPECIFIED WHETHER SCIATICA PRESENT: ICD-10-CM

## 2020-12-21 PROCEDURE — 97530 THERAPEUTIC ACTIVITIES: CPT | Mod: GP | Performed by: PHYSICAL THERAPIST

## 2020-12-21 PROCEDURE — 97112 NEUROMUSCULAR REEDUCATION: CPT | Mod: GP | Performed by: PHYSICAL THERAPIST

## 2020-12-21 PROCEDURE — 97110 THERAPEUTIC EXERCISES: CPT | Mod: GP | Performed by: PHYSICAL THERAPIST

## 2020-12-23 ENCOUNTER — TELEPHONE (OUTPATIENT)
Dept: UROLOGY | Facility: CLINIC | Age: 65
End: 2020-12-23

## 2020-12-23 DIAGNOSIS — N20.0 CALCULUS OF KIDNEY: Primary | ICD-10-CM

## 2020-12-23 NOTE — TELEPHONE ENCOUNTER
Per MD-He should drop-off a sample. Called patient and informed. Order placed. He will drop-off at our office next week.     Betty Wu LPN

## 2020-12-23 NOTE — TELEPHONE ENCOUNTER
[facility] Call Center    Phone Message    May a detailed message be left on voicemail: no     Reason for Call: Other: Pt reports 1 large stone and 2 small stones passed last night during voiding. Pt says the large stone is about 1/3 or 1/2 the size of a dime. Pt denied any blood or other LUTS accompanying the passing of these stones except for more frequent urgency. Pt would like to discuss what to do with the stones. Please call Pt back.     Action Taken: Message routed to:  Other:  CLINICAL POOL    Travel Screening: Not Applicable

## 2020-12-29 DIAGNOSIS — N20.0 CALCULUS OF KIDNEY: ICD-10-CM

## 2020-12-29 PROCEDURE — 99000 SPECIMEN HANDLING OFFICE-LAB: CPT | Performed by: UROLOGY

## 2020-12-29 PROCEDURE — 82365 CALCULUS SPECTROSCOPY: CPT | Mod: 90 | Performed by: UROLOGY

## 2020-12-31 LAB
APPEARANCE STONE: NORMAL
COMPN STONE: NORMAL
NUMBER STONE: 2
SIZE STONE: NORMAL MM
WT STONE: 138 MG

## 2021-01-04 ENCOUNTER — THERAPY VISIT (OUTPATIENT)
Dept: PHYSICAL THERAPY | Facility: CLINIC | Age: 66
End: 2021-01-04
Payer: COMMERCIAL

## 2021-01-04 DIAGNOSIS — M54.50 ACUTE RIGHT-SIDED LOW BACK PAIN, UNSPECIFIED WHETHER SCIATICA PRESENT: ICD-10-CM

## 2021-01-04 PROCEDURE — 97140 MANUAL THERAPY 1/> REGIONS: CPT | Mod: GP | Performed by: PHYSICAL THERAPIST

## 2021-01-04 PROCEDURE — 97110 THERAPEUTIC EXERCISES: CPT | Mod: GP | Performed by: PHYSICAL THERAPIST

## 2021-01-09 ENCOUNTER — HEALTH MAINTENANCE LETTER (OUTPATIENT)
Age: 66
End: 2021-01-09

## 2021-01-12 ENCOUNTER — TELEPHONE (OUTPATIENT)
Dept: FAMILY MEDICINE | Facility: CLINIC | Age: 66
End: 2021-01-12

## 2021-01-12 NOTE — TELEPHONE ENCOUNTER
"States every year at this time he gets \"throat clearing; thick mucous/nasal drainage at back of throat\". THICK, HEAVY phlegm  No sinus congestion  No headache. No runny nose or sniffles. No sore throat.  No fever. Normal activity/working without issue.    Has used Flonase in the past (has on hand) and Mucinex in the past (has on hand).  Both seem like reasonable choices.   States he also used Allegra D, though since he has no sinus congestion, I advised against a decongestant for now.   Treat the symptoms that are bothersome only.  Warm salt water gargles; warm herbal tea, hydration.    If symptoms not resolving SOON, call to schedule OV. He agrees with plan.  Magali Khan RN on 1/12/2021 at 1:26 PM         "

## 2021-02-22 ENCOUNTER — HOSPITAL ENCOUNTER (OUTPATIENT)
Dept: CT IMAGING | Facility: CLINIC | Age: 66
Discharge: HOME | End: 2021-02-22
Attending: UROLOGY | Admitting: UROLOGY
Payer: COMMERCIAL

## 2021-02-22 ENCOUNTER — OFFICE VISIT (OUTPATIENT)
Dept: UROLOGY | Facility: CLINIC | Age: 66
End: 2021-02-22
Payer: COMMERCIAL

## 2021-02-22 VITALS
HEIGHT: 73 IN | BODY MASS INDEX: 28.89 KG/M2 | SYSTOLIC BLOOD PRESSURE: 158 MMHG | OXYGEN SATURATION: 99 % | HEART RATE: 93 BPM | DIASTOLIC BLOOD PRESSURE: 98 MMHG | WEIGHT: 218 LBS

## 2021-02-22 DIAGNOSIS — N20.0 CALCULUS OF KIDNEY: ICD-10-CM

## 2021-02-22 DIAGNOSIS — N20.0 CALCULUS OF KIDNEY: Primary | ICD-10-CM

## 2021-02-22 PROCEDURE — 74176 CT ABD & PELVIS W/O CONTRAST: CPT

## 2021-02-22 PROCEDURE — 99213 OFFICE O/P EST LOW 20 MIN: CPT | Performed by: UROLOGY

## 2021-02-22 ASSESSMENT — MIFFLIN-ST. JEOR: SCORE: 1837.72

## 2021-02-22 ASSESSMENT — PAIN SCALES - GENERAL: PAINLEVEL: NO PAIN (0)

## 2021-02-22 NOTE — LETTER
2/22/2021       RE: Jeffrey Conteh  [address]  [place] [address]     Dear Colleague,    Thank you for referring your patient, Jeffrey Conteh, to the [facility]OLOGY [facility] [place] at [facility]. Please see a copy of my visit note below.    It is a great pleasure to see this very pleasant 65-year-old gentleman in follow-up consultation today.  We recall he had presented to us over 3 months ago with discomfort on the right side with a 1.2 cm stone in the right upper ureter with no previous history of urinary stone disease.  He has a large, 8 cm, peripelvic cyst on the right side but this does not seem to be causing any obstructive of the right kidney.  The left kidney is completely normal without evidence of stones or hydronephrosis.  Subsequently after stent placement, we treated him with shockwave lithotripsy.  He has passed many fragments.  Stone analysis is consistent with calcium oxalate monohydrate.    At this point, he did pass a couple of fragments recently the last was this morning and we have done a CT scan just after he passed a fragment.  I have carefully reviewed the CT scan today which has not yet been reported.  The stone that he just passed off of the CT can be noted in the bladder but no other stone fragments can be seen in the upper urinary tract on the right side.  There is no hydronephrosis.  The cyst adjacent to the right kidney is unchanged in size.    Past Medical History:   Diagnosis Date     Hypertension      Past Surgical History:   Procedure Laterality Date     APPENDECTOMY      2006 along with diverticulities      C TOTAL HIP ARTHROPLASTY      2005     COMBINED CYSTOSCOPY, INSERT STENT URETER(S) Right 10/15/2020    Procedure: CYSTOSCOPY, WITH RIGHT URETERAL STENT PLACEMENT, RIGHT RETROGRADE PYLEOGRAM, RIGHT URETERAL STONE DISPACEMENT WITHOUT REMOVAL;  Surgeon: Lucio Butt MD;  Location:  OR      EXTRACORPOREAL SHOCK WAVE LITHOTRIPSY (ESWL) Right 11/10/2020    Procedure: LITHOTRIPSY, EXTRACORPOREAL SHOCK WAVE (ESWL);  Surgeon: Lucio Butt MD;  Location: [facility] OR     SEPTOPLASTY       Social History     Socioeconomic History     Marital status:      Spouse name:      Number of children: 2     Years of education: Not on file     Highest education level: Not on file   Occupational History     Occupation: group home    Social Needs     Financial resource strain: Not on file     Food insecurity     Worry: Not on file     Inability: Not on file     Transportation needs     Medical: Not on file     Non-medical: Not on file   Tobacco Use     Smoking status: Never Smoker     Smokeless tobacco: Never Used   Substance and Sexual Activity     Alcohol use: Yes     Alcohol/week: 0.0 standard drinks     Comment: less than weekly      Drug use: No     Sexual activity: Not Currently   Lifestyle     Physical activity     Days per week: Not on file     Minutes per session: Not on file     Stress: Not on file   Relationships     Social connections     Talks on phone: Not on file     Gets together: Not on file     Attends Congregational service: Not on file     Active member of club or organization: Not on file     Attends meetings of clubs or organizations: Not on file     Relationship status: Not on file     Intimate partner violence     Fear of current or ex partner: Not on file     Emotionally abused: Not on file     Physically abused: Not on file     Forced sexual activity: Not on file   Other Topics Concern     Parent/sibling w/ CABG, MI or angioplasty before 65F 55M? Not Asked   Social History Narrative     Not on file       Current Outpatient Medications:      acetaminophen (TYLENOL) 325 MG tablet, Take 2 tablets (650 mg) by mouth every 4 hours as needed for mild pain, Disp: 50 tablet, Rfl: 0     calcium carbonate antacid (TUMS ULTRA 1000) 1000 MG CHEW, Take 1 chew tab by mouth every morning , Disp: ,  Rfl:      Cholecalciferol (VITAMIN D) 1000 UNITS capsule, Take 1 capsule by mouth every morning , Disp: , Rfl:      cyclobenzaprine (FLEXERIL) 5 MG tablet, Take 1-2 tablets (5-10 mg) by mouth 3 times daily as needed for muscle spasms, Disp: 40 tablet, Rfl: 0     diphenhydrAMINE-acetaminophen (TYLENOL PM)  MG tablet, Take 1 tablet by mouth nightly as needed for sleep, Disp: , Rfl:      Magnesium 500 MG CAPS, Take 500 mg by mouth every morning , Disp: , Rfl:      methylPREDNISolone (MEDROL DOSEPAK) 4 MG tablet therapy pack, Follow Package Directions, Disp: 21 tablet, Rfl: 0     multivitamin, therapeutic with minerals (MULTI-VITAMIN) TABS tablet, Take 1 tablet by mouth every morning Senior 55 plus, Disp: , Rfl:      naproxen (NAPROSYN) 500 MG tablet, Take 500 mg by mouth 2 times daily as needed for moderate pain , Disp: , Rfl:      polyethylene glycol (MIRALAX) 17 g packet, Take 1 packet by mouth every morning, Disp: , Rfl:      psyllium (METAMUCIL/KONSYL) 58.6 % powder, Take 2 teaspoonful by mouth every morning, Disp: , Rfl: Showing something    Impression.  I discussed the situation and went over it in detail with him today  .  This was his first stone.  And he is now passed all the fragments.  The stone was a large stone, and in view of this and in view of the fact also has been taking Tums in the last 20 years I would like to arrange for metabolic stone evaluation to determine if there are any other factors that should be addressed to try and prevent further stones forming.  I did have a discussion with him about basic measures including hydration, increasing calcium, magnesium and citrate in the diet, reducing sodium in the diet, and cutting back on red meat.  I also told her about metabolic stone evaluation in detail and we will arrange an appointment with a nephrologist for this.  The second issue is the large peripelvic cyst which seems to be deviating the right ureter.  There is no evidence of  "hydronephrosis and there does not seem to be any evidence that this would have led to the stone formation.  However, we did have a wide-ranging discussion about renal cysts and their potential treatment.  The only reasons I would consider any sort of treatment for the cyst would be if he was getting flank pain which we could attribute to the cyst, or if there is increasing evidence of obstruction of the kidney by the cyst.  We would initially drain the cyst with a needle under ultrasound guidance to see if the pain goes away over the obstruction is relieved, but the cyst could then recur in which case he would need a laparoscopic decortication.  I think that is very unlikely it is quite rare that we have had to do that.  At this point therefore we will arrange for him to meet with our nephrologist regarding metabolic stone evaluation.  We will see him on a as needed basis cells.  I went over the entire situation with the patient in detail today.  I reviewed all pertinent labs radiologic studies and records.  I addressed and answered many questions    Plan.  We will see him on a as needed basis.  We will arrange for metabolic stone evaluation by a nephrologist    Time.  25 minutes with greater than 50% in discussion and consultation.  This did involve, as noted above wide-ranging discussions both about issues related to his stone disease and also issues related to the presence of a large renal cyst    \"This dictation was performed with voice recognition software and may contain errors,  omissions and inadvertent word substitution.\"      Lucio Butt MD      "

## 2021-02-22 NOTE — PROGRESS NOTES
It is a great pleasure to see this very pleasant 65-year-old gentleman in follow-up consultation today.  We recall he had presented to us over 3 months ago with discomfort on the right side with a 1.2 cm stone in the right upper ureter with no previous history of urinary stone disease.  He has a large, 8 cm, peripelvic cyst on the right side but this does not seem to be causing any obstructive of the right kidney.  The left kidney is completely normal without evidence of stones or hydronephrosis.  Subsequently after stent placement, we treated him with shockwave lithotripsy.  He has passed many fragments.  Stone analysis is consistent with calcium oxalate monohydrate.    At this point, he did pass a couple of fragments recently the last was this morning and we have done a CT scan just after he passed a fragment.  I have carefully reviewed the CT scan today which has not yet been reported.  The stone that he just passed off of the CT can be noted in the bladder but no other stone fragments can be seen in the upper urinary tract on the right side.  There is no hydronephrosis.  The cyst adjacent to the right kidney is unchanged in size.    Past Medical History:   Diagnosis Date     Hypertension      Past Surgical History:   Procedure Laterality Date     APPENDECTOMY      2006 along with diverticulities      C TOTAL HIP ARTHROPLASTY      2005     COMBINED CYSTOSCOPY, INSERT STENT URETER(S) Right 10/15/2020    Procedure: CYSTOSCOPY, WITH RIGHT URETERAL STENT PLACEMENT, RIGHT RETROGRADE PYLEOGRAM, RIGHT URETERAL STONE DISPACEMENT WITHOUT REMOVAL;  Surgeon: Lucio Butt MD;  Location:  OR     EXTRACORPOREAL SHOCK WAVE LITHOTRIPSY (ESWL) Right 11/10/2020    Procedure: LITHOTRIPSY, EXTRACORPOREAL SHOCK WAVE (ESWL);  Surgeon: Lucio Butt MD;  Location:  OR     SEPTOPLASTY       Social History     Socioeconomic History     Marital status:      Spouse name:      Number of children: 2      Years of education: Not on file     Highest education level: Not on file   Occupational History     Occupation: group home    Social Needs     Financial resource strain: Not on file     Food insecurity     Worry: Not on file     Inability: Not on file     Transportation needs     Medical: Not on file     Non-medical: Not on file   Tobacco Use     Smoking status: Never Smoker     Smokeless tobacco: Never Used   Substance and Sexual Activity     Alcohol use: Yes     Alcohol/week: 0.0 standard drinks     Comment: less than weekly      Drug use: No     Sexual activity: Not Currently   Lifestyle     Physical activity     Days per week: Not on file     Minutes per session: Not on file     Stress: Not on file   Relationships     Social connections     Talks on phone: Not on file     Gets together: Not on file     Attends Denominational service: Not on file     Active member of club or organization: Not on file     Attends meetings of clubs or organizations: Not on file     Relationship status: Not on file     Intimate partner violence     Fear of current or ex partner: Not on file     Emotionally abused: Not on file     Physically abused: Not on file     Forced sexual activity: Not on file   Other Topics Concern     Parent/sibling w/ CABG, MI or angioplasty before 65F 55M? Not Asked   Social History Narrative     Not on file       Current Outpatient Medications:      acetaminophen (TYLENOL) 325 MG tablet, Take 2 tablets (650 mg) by mouth every 4 hours as needed for mild pain, Disp: 50 tablet, Rfl: 0     calcium carbonate antacid (TUMS ULTRA 1000) 1000 MG CHEW, Take 1 chew tab by mouth every morning , Disp: , Rfl:      Cholecalciferol (VITAMIN D) 1000 UNITS capsule, Take 1 capsule by mouth every morning , Disp: , Rfl:      cyclobenzaprine (FLEXERIL) 5 MG tablet, Take 1-2 tablets (5-10 mg) by mouth 3 times daily as needed for muscle spasms, Disp: 40 tablet, Rfl: 0     diphenhydrAMINE-acetaminophen (TYLENOL PM)  MG tablet,  Take 1 tablet by mouth nightly as needed for sleep, Disp: , Rfl:      Magnesium 500 MG CAPS, Take 500 mg by mouth every morning , Disp: , Rfl:      methylPREDNISolone (MEDROL DOSEPAK) 4 MG tablet therapy pack, Follow Package Directions, Disp: 21 tablet, Rfl: 0     multivitamin, therapeutic with minerals (MULTI-VITAMIN) TABS tablet, Take 1 tablet by mouth every morning Senior 55 plus, Disp: , Rfl:      naproxen (NAPROSYN) 500 MG tablet, Take 500 mg by mouth 2 times daily as needed for moderate pain , Disp: , Rfl:      polyethylene glycol (MIRALAX) 17 g packet, Take 1 packet by mouth every morning, Disp: , Rfl:      psyllium (METAMUCIL/KONSYL) 58.6 % powder, Take 2 teaspoonful by mouth every morning, Disp: , Rfl: Showing something    Impression.  I discussed the situation and went over it in detail with him today  .  This was his first stone.  And he is now passed all the fragments.  The stone was a large stone, and in view of this and in view of the fact also has been taking Tums in the last 20 years I would like to arrange for metabolic stone evaluation to determine if there are any other factors that should be addressed to try and prevent further stones forming.  I did have a discussion with him about basic measures including hydration, increasing calcium, magnesium and citrate in the diet, reducing sodium in the diet, and cutting back on red meat.  I also told her about metabolic stone evaluation in detail and we will arrange an appointment with a nephrologist for this.  The second issue is the large peripelvic cyst which seems to be deviating the right ureter.  There is no evidence of hydronephrosis and there does not seem to be any evidence that this would have led to the stone formation.  However, we did have a wide-ranging discussion about renal cysts and their potential treatment.  The only reasons I would consider any sort of treatment for the cyst would be if he was getting flank pain which we could  "attribute to the cyst, or if there is increasing evidence of obstruction of the kidney by the cyst.  We would initially drain the cyst with a needle under ultrasound guidance to see if the pain goes away over the obstruction is relieved, but the cyst could then recur in which case he would need a laparoscopic decortication.  I think that is very unlikely it is quite rare that we have had to do that.  At this point therefore we will arrange for him to meet with our nephrologist regarding metabolic stone evaluation.  We will see him on a as needed basis cells.  I went over the entire situation with the patient in detail today.  I reviewed all pertinent labs radiologic studies and records.  I addressed and answered many questions    Plan.  We will see him on a as needed basis.  We will arrange for metabolic stone evaluation by a nephrologist    Time.  25 minutes with greater than 50% in discussion and consultation.  This did involve, as noted above wide-ranging discussions both about issues related to his stone disease and also issues related to the presence of a large renal cyst    \"This dictation was performed with voice recognition software and may contain errors,  omissions and inadvertent word substitution.\"      "

## 2021-02-22 NOTE — NURSING NOTE
Chief Complaint   Patient presents with     stones   Patient was unable to leave a urine specimen today.  Jennifer Choi LPN

## 2021-03-23 ENCOUNTER — OFFICE VISIT (OUTPATIENT)
Dept: FAMILY MEDICINE | Facility: CLINIC | Age: 66
End: 2021-03-23
Payer: COMMERCIAL

## 2021-03-23 VITALS
OXYGEN SATURATION: 97 % | BODY MASS INDEX: 29.82 KG/M2 | TEMPERATURE: 96.1 F | HEART RATE: 88 BPM | DIASTOLIC BLOOD PRESSURE: 94 MMHG | WEIGHT: 226 LBS | SYSTOLIC BLOOD PRESSURE: 164 MMHG

## 2021-03-23 DIAGNOSIS — H60.92 OTITIS EXTERNA OF LEFT EAR, UNSPECIFIED CHRONICITY, UNSPECIFIED TYPE: Primary | ICD-10-CM

## 2021-03-23 DIAGNOSIS — I10 BENIGN ESSENTIAL HYPERTENSION: ICD-10-CM

## 2021-03-23 PROCEDURE — 99214 OFFICE O/P EST MOD 30 MIN: CPT | Performed by: FAMILY MEDICINE

## 2021-03-23 RX ORDER — OFLOXACIN 3 MG/ML
5 SOLUTION AURICULAR (OTIC) DAILY
Qty: 5 ML | Refills: 0 | Status: SHIPPED | OUTPATIENT
Start: 2021-03-23 | End: 2021-06-22

## 2021-03-23 RX ORDER — AMLODIPINE BESYLATE 5 MG/1
5 TABLET ORAL DAILY
Qty: 30 TABLET | Refills: 3 | Status: SHIPPED | OUTPATIENT
Start: 2021-03-23 | End: 2021-03-24

## 2021-03-23 NOTE — PROGRESS NOTES
"    Assessment & Plan     Otitis externa of left ear, unspecified chronicity, unspecified type  Patient is reassured I do not see any earwax however his ear canals are slightly inflamed.  We will treated with ofloxacin for possible otitis externa.  If symptoms does not improve he will let us know we will send him to ENT for further evaluation.  - ofloxacin (FLOXIN) 0.3 % otic solution  Dispense: 5 mL; Refill: 0    Benign essential hypertension  Patient blood pressure is elevated recheck was also elevated.  In the past he has few high readings as well.  We discussed about lifestyle changes along with medication.  He is willing to take medication we will try amlodipine he will follow-up in 3 months in person for recheck  - amLODIPine (NORVASC) 5 MG tablet  Dispense: 30 tablet; Refill: 3      BMI:   Estimated body mass index is 29.82 kg/m  as calculated from the following:    Height as of 2/22/21: 1.854 m (6' 1\").    Weight as of this encounter: 102.5 kg (226 lb).           Return in about 3 months (around 6/23/2021) for HTN check.    Dolye Silva MD  [facility] KANU Murphy is a 65 year old who presents for the following health issues     HPI     Concern - ear wax   Onset: couple of months  Description: concerned about ear wax build up - decreased hearing   Complains of left ear plugged feeling.  In the past he has some hearing issues as well.  Feels like your canal is plugged.    Patient is noted to have elevated blood pressure.  In the past he also has elevated blood pressure but he was resistant in taking medication.  Denies any chest pain no shortness of breath.        Review of Systems   Constitutional, HEENT, cardiovascular, pulmonary, gi and gu systems are negative, except as otherwise noted.      Objective    BP (!) 164/94   Pulse 88   Temp 96.1  F (35.6  C) (Tympanic)   Wt 102.5 kg (226 lb)   SpO2 97%   BMI 29.82 kg/m    Body mass index is 29.82 kg/m .  Physical Exam "   GENERAL: healthy, alert and no distress  NECK: no adenopathy, no asymmetry, masses, or scars and thyroid normal to palpation  Left ear canal is slight inflamed, no ear wax  RESP: lungs clear to auscultation - no rales, rhonchi or wheezes  CV: regular rate and rhythm, normal S1 S2, no S3 or S4, no murmur, click or rub, no peripheral edema and peripheral pulses strong  MS: no gross musculoskeletal defects noted, no edema

## 2021-03-24 RX ORDER — AMLODIPINE BESYLATE 5 MG/1
TABLET ORAL
Qty: 90 TABLET | Refills: 0 | Status: SHIPPED | OUTPATIENT
Start: 2021-03-24 | End: 2021-06-22

## 2021-03-24 NOTE — TELEPHONE ENCOUNTER
Prescription approved per [facility] Refill Protocol.    Haley JOYCE RN  EP Triage

## 2021-04-14 NOTE — PROGRESS NOTES
"Subjective     Jeffrey Conteh is a 64 year old male who presents to clinic today for the following health issues:    HPI   ED/UC Followup:    Facility:  [facility]  Date of visit: 9/7/19  Reason for visit: intermittent abd pain  Current Status: Slight improvement there is no sharp discomfort.  Some gas bloating sensation which is resolving.  No fever no chills.  No testicular tenderness.                 Reviewed and updated as needed this visit by Provider         Review of Systems   ROS COMP: Constitutional, HEENT, cardiovascular, pulmonary, gi and gu systems are negative, except as otherwise noted.      Objective    BP (!) 144/80   Pulse 68   Temp 98.6  F (37  C) (Tympanic)   Wt 97.1 kg (214 lb)   SpO2 98%   BMI 28.55 kg/m    Body mass index is 28.55 kg/m .  Physical Exam   GENERAL: healthy, alert and no distress  RESP: lungs clear to auscultation - no rales, rhonchi or wheezes  CV: regular rate and rhythm, normal S1 S2, no S3 or S4, no murmur, click or rub, no peripheral edema and peripheral pulses strong  ABDOMEN: soft, nontender, no hepatosplenomegaly, no masses and bowel sounds normal.  No rebound tenderness.        Assessment & Plan     1. LLQ abdominal pain  Patient had left lower quadrant discomfort which is slowly improving on antibiotics currently on Cipro and Flagyl.  Past history of diverticulitis.  He is advised to finish the antibiotic.  Warning signs including any rebound tenderness,s any pain with walking or any fever is discussed with the patient.  He is systemic symptoms are normal.  If symptoms continue or any worsening I would suggest evaluation with CT scan.      2. Flatulence, eructation and gas pain  Slight improved.       BMI:   Estimated body mass index is 28.55 kg/m  as calculated from the following:    Height as of 6/27/19: 1.844 m (6' 0.6\").    Weight as of this encounter: 97.1 kg (214 lb).     Doyle Silva MD  [facility]      " [place] Pregnancy And Lactation Text: This medication is not safe during pregnancy. It is unknown if this medication is excreted in breast milk.

## 2021-04-16 ENCOUNTER — TRANSFERRED RECORDS (OUTPATIENT)
Dept: HEALTH INFORMATION MANAGEMENT | Facility: CLINIC | Age: 66
End: 2021-04-16

## 2021-04-19 ENCOUNTER — TRANSFERRED RECORDS (OUTPATIENT)
Dept: HEALTH INFORMATION MANAGEMENT | Facility: CLINIC | Age: 66
End: 2021-04-19

## 2021-04-29 PROBLEM — M54.50 ACUTE RIGHT-SIDED LOW BACK PAIN: Status: RESOLVED | Noted: 2020-12-16 | Resolved: 2021-04-29

## 2021-04-29 NOTE — PROGRESS NOTES
"Subjective:  HPI  Physical Exam                    Objective:  System    Physical Exam    General     ROS    Assessment/Plan:    DISCHARGE REPORT    Progress reporting period is from 12/16/2020 to 1/4/2021.       SUBJECTIVE  Subjective changes noted by patient:  As of last PT visit on 1/4/2021, pt reports that his pain is about the same - he also has not been able to do the exercise much - maybe 2x/day but not even every day.  He is avoiding bending over because that is what really bothers his back.  He has not returned for any additional PT since 1/4/2021, so current subjective changes are unknown.    OBJECTIVE  Changes noted in objective findings:  The objective findings below are from DOS 1/4/2021.  Objective: a litte \"achiness\" this morning on the R side.   Lumbar AROM flex min loss, ext min loss, B SG WNL.     ASSESSMENT/PLAN  Assessment of Progress: The patient has not returned to therapy. Current status is unknown.  Self Management Plans:  Patient has been instructed in a home treatment program.    Recommendations:  Pt will be discharged from PT.            "

## 2021-06-22 ENCOUNTER — OFFICE VISIT (OUTPATIENT)
Dept: FAMILY MEDICINE | Facility: CLINIC | Age: 66
End: 2021-06-22
Payer: COMMERCIAL

## 2021-06-22 VITALS
WEIGHT: 224 LBS | HEART RATE: 74 BPM | TEMPERATURE: 96.3 F | BODY MASS INDEX: 29.55 KG/M2 | OXYGEN SATURATION: 97 % | SYSTOLIC BLOOD PRESSURE: 136 MMHG | RESPIRATION RATE: 20 BRPM | DIASTOLIC BLOOD PRESSURE: 78 MMHG

## 2021-06-22 DIAGNOSIS — I10 ESSENTIAL HYPERTENSION: ICD-10-CM

## 2021-06-22 DIAGNOSIS — Z11.4 SCREENING FOR HIV (HUMAN IMMUNODEFICIENCY VIRUS): Primary | ICD-10-CM

## 2021-06-22 DIAGNOSIS — I10 BENIGN ESSENTIAL HYPERTENSION: ICD-10-CM

## 2021-06-22 DIAGNOSIS — H91.90 HEARING LOSS, UNSPECIFIED HEARING LOSS TYPE, UNSPECIFIED LATERALITY: ICD-10-CM

## 2021-06-22 PROCEDURE — 99214 OFFICE O/P EST MOD 30 MIN: CPT | Performed by: FAMILY MEDICINE

## 2021-06-22 RX ORDER — AMLODIPINE BESYLATE 5 MG/1
TABLET ORAL
Qty: 90 TABLET | Refills: 1 | Status: SHIPPED | OUTPATIENT
Start: 2021-06-22 | End: 2021-12-28

## 2021-06-22 ASSESSMENT — PAIN SCALES - GENERAL: PAINLEVEL: NO PAIN (0)

## 2021-06-22 NOTE — PROGRESS NOTES
"    Assessment & Plan     Screening for HIV (human immunodeficiency virus)      Essential hypertension    - amLODIPine (NORVASC) 5 MG tablet; TAKE 1 TABLET(5 MG) BY MOUTH DAILY    Benign essential hypertension  Pressure is stable medication refill.  Low-salt diet regular exercise and increase hydration discussed.  - amLODIPine (NORVASC) 5 MG tablet; TAKE 1 TABLET(5 MG) BY MOUTH DAILY    Hearing loss, unspecified hearing loss type, unspecified laterality  Complains of hearing issues with some hearing loss.  Suggested ENT evaluation.  - OTOLARYNGOLOGY REFERRAL       BMI:   Estimated body mass index is 29.55 kg/m  as calculated from the following:    Height as of 2/22/21: 1.854 m (6' 1\").    Weight as of this encounter: 101.6 kg (224 lb).           Return in about 6 months (around 12/22/2021) for Physical Exam.    Doyle Silva MD  [facility] KANU Murphy is a 66 year old who presents for the following health issues    HPI     Hypertension Follow-up  Patient was started on amlodipine for his blood pressure.  He has been taking most of the days.  Denies any side effects.  History of kidney stones and trying to obtain some better diet which include low protein diet.  Sometimes frustrated because no better communication from nephrology office.  Denies any chest pains no shortness of breath.    Do you check your blood pressure regularly outside of the clinic? No     Are you following a low salt diet? No    Are your blood pressures ever more than 140 on the top number (systolic) OR more   than 90 on the bottom number (diastolic), for example 140/90? Yes      How many servings of fruits and vegetables do you eat daily?  4 or more    On average, how many sweetened beverages do you drink each day (Examples: soda, juice, sweet tea, etc.  Do NOT count diet or artificially sweetened beverages)?   0    How many days per week do you exercise enough to make your heart beat faster? Light walking "     How many minutes a day do you exercise enough to make your heart beat faster? NA    How many days per week do you miss taking your medication? 0    Planes of some hearing issues for the last couple of months.  It has not completely cleared feel like congested some hearing problem.  Would like to see ENT.  Review of Systems   Constitutional, HEENT, cardiovascular, pulmonary, gi and gu systems are negative, except as otherwise noted.      Objective    BP (!) 144/80   Pulse 74   Temp 96.3  F (35.7  C) (Tympanic)   Resp 20   Wt 101.6 kg (224 lb)   SpO2 97%   BMI 29.55 kg/m    Body mass index is 29.55 kg/m .  Physical Exam   GENERAL: healthy, alert and no distress  NECK: no adenopathy, no asymmetry, masses, or scars and thyroid normal to palpation  RESP: lungs clear to auscultation - no rales, rhonchi or wheezes  CV: regular rate and rhythm, normal S1 S2, no S3 or S4, no murmur, click or rub, no peripheral edema and peripheral pulses strong

## 2021-08-12 ENCOUNTER — TRANSFERRED RECORDS (OUTPATIENT)
Dept: HEALTH INFORMATION MANAGEMENT | Facility: CLINIC | Age: 66
End: 2021-08-12

## 2021-08-24 ENCOUNTER — OFFICE VISIT (OUTPATIENT)
Dept: FAMILY MEDICINE | Facility: CLINIC | Age: 66
End: 2021-08-24
Payer: COMMERCIAL

## 2021-08-24 VITALS
HEART RATE: 72 BPM | HEIGHT: 73 IN | OXYGEN SATURATION: 97 % | SYSTOLIC BLOOD PRESSURE: 124 MMHG | WEIGHT: 225 LBS | BODY MASS INDEX: 29.82 KG/M2 | TEMPERATURE: 97.2 F | DIASTOLIC BLOOD PRESSURE: 80 MMHG

## 2021-08-24 DIAGNOSIS — M62.830 BACK MUSCLE SPASM: Primary | ICD-10-CM

## 2021-08-24 PROCEDURE — 99214 OFFICE O/P EST MOD 30 MIN: CPT | Performed by: FAMILY MEDICINE

## 2021-08-24 RX ORDER — CYCLOBENZAPRINE HCL 5 MG
5 TABLET ORAL AT BEDTIME
Qty: 10 TABLET | Refills: 0 | Status: SHIPPED | OUTPATIENT
Start: 2021-08-24 | End: 2022-04-26

## 2021-08-24 ASSESSMENT — PAIN SCALES - GENERAL: PAINLEVEL: MILD PAIN (3)

## 2021-08-24 ASSESSMENT — MIFFLIN-ST. JEOR: SCORE: 1864.47

## 2021-08-24 NOTE — PROGRESS NOTES
Assessment & Plan     Back muscle spasm  Patient has ongoing issues with low back with some back muscle spasm most likely secondary to some postural issues.  Advised to work on his posture and do some physical therapy to see if that helps use Flexeril sparingly at night to see if it improve his acute symptoms.  Any new or changing symptoms he should report back.  - [facility]erral; Future  - cyclobenzaprine (FLEXERIL) 5 MG tablet; Take 1 tablet (5 mg) by mouth At Bedtime           No follow-ups on file.    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 a 66 year old who presents for the following health issues     HPI     Back Pain  Onset/Duration: x 2 weeks   Description:   Location of pain: middle of back bilateral  Character of pain: dull ache  Pain radiation: none   New numbness or weakness in legs, not attributed to pain: no   Intensity: mild, severe  Progression of Symptoms: improving  History:   Specific cause: lumbar roll that he uses while driving   Pain interferes with job: YES- sometimes   History of back problems: recurrent self limited episodes of low back pain in the past and arthritis in the back   Any previous MRI or X-rays: Yes- at [place].  Date 12/2/2020  Sees a specialist for back pain: No  Alleviating factors:   Improved by: none    Precipitating factors:  Worsened by: Nothing  Therapies tried and outcome: NSAIDs  Patient has a work where he has to sometimes stand or sometimes sit in for sure is not the great.  That has caused some discomfort in the bilateral back which cause some spasm.  Denies any numbness tingling.  In the past he has evaluation done he has mild to moderate arthritis.  There is no foot pain or any numbness tingling noted.  Accompanying Signs  Review of Systems   Constitutional, HEENT, cardiovascular, pulmonary, gi and gu systems are negative, except as otherwise noted.      Objective    /80   Pulse 72   Temp 97.2  F (36.2  " C) (Tympanic)   Ht 1.854 m (6' 1\")   Wt 102.1 kg (225 lb)   SpO2 97%   BMI 29.69 kg/m    Body mass index is 29.69 kg/m .  Physical Exam   GENERAL: healthy, alert and no distress  NECK: no adenopathy, no asymmetry, masses, or scars and thyroid normal to palpation  ABDOMEN: soft, nontender, no hepatosplenomegaly, no masses and bowel sounds normal  Para  spinous tenderness and spasm noted.  Straight leg raise is negative            "

## 2021-09-01 ENCOUNTER — THERAPY VISIT (OUTPATIENT)
Dept: PHYSICAL THERAPY | Facility: CLINIC | Age: 66
End: 2021-09-01
Attending: FAMILY MEDICINE
Payer: COMMERCIAL

## 2021-09-01 DIAGNOSIS — M62.830 BACK MUSCLE SPASM: ICD-10-CM

## 2021-09-01 DIAGNOSIS — M54.50 ACUTE BILATERAL LOW BACK PAIN WITHOUT SCIATICA: ICD-10-CM

## 2021-09-01 PROCEDURE — 97140 MANUAL THERAPY 1/> REGIONS: CPT | Mod: GP | Performed by: PHYSICAL THERAPIST

## 2021-09-01 PROCEDURE — 97161 PT EVAL LOW COMPLEX 20 MIN: CPT | Mod: GP | Performed by: PHYSICAL THERAPIST

## 2021-09-01 PROCEDURE — 97110 THERAPEUTIC EXERCISES: CPT | Mod: GP | Performed by: PHYSICAL THERAPIST

## 2021-09-01 NOTE — PROGRESS NOTES
[facility] Initial Evaluation -- Lumbar    Date: September 1, 2021  Jeffrey Conteh is a 66 year old male with a lumbar condition.   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     Symptoms (improving/unchanging/worsening):  improving.     Symptoms commenced as a result of: unknown   Condition occurred in the following environment:   unknown     Symptoms at onset (back/thigh/leg): B upper lumbar pain  Constant symptoms (back/thigh/leg): none  Intermittent symptoms (back/thigh/leg): B upper lumbar pain    Symptoms are made worse with the following: shifting while sitting, lying down,    Symptoms are made better with the following: bringing knees up to chest while sleeping    Disturbed sleep (yes/no):  yes Sleeping postures (prone/sup/side R/L): sides, supine    Previous episodes (0/1-5/6-10/11+): 1 Year of first episode: 2020    Previous history: previous LBP but lower lumbar  Previous treatments: previous episode resolved with PT      Specific Questions:  Cough/Sneeze/Strain (pos/neg): neg  Bowel/Bladder (normal/abnormal): normal  Gait (normal/abnormal): normal  Medications (nil/NSAIDS/analg/steroids/anticoag/other):  Muscle relaxants and Other - High blood pressure  Medical allergies:  none  General health (excellent/good/fair/poor):  good  Pertinent medical history:  High blood pressure, Osteoarthritis, Sleep disorder/apnea and kidney stones  Imaging (None/Xray/MRI/Other):  X-ray--lumbar--mild diffuse  disc space narrowing  Recent or major surgery (yes/no):  No; hx of L STELLA, L Achilles repair, kidney stone  Night pain (yes/no): no  Accidents (yes/no): no  Unexplained weight loss (yes/no): no  Barriers at home: no  Other red flags: no    EXAMINATION    Posture:   Sitting (good/fair/poor): fair  Standing (good/fair/poor):good  Lordosis (red/acc/normal): red  Correction of posture (better/worse/no effect): NE    Lateral Shift (right/left/nil): nil  Relevant (yes/no):  nA  Other Observations: NA    Neurological:    Motor deficit:  Not assessed--no radicular complaints.  Reflexes:  Not assessed--no radicular complaints  Sensory deficit:  Not assessed--no radicular complaints    Dural signs:  Not assessed--no radicular complaints      Movement Loss:   Angel Luis Mod Min Nil Pain   Flexion   x  Increases B LBP, pain with return to neutral   Extension  x   Increases B LBP   Side Gliding R   x  Increases B LBP   Side Gliding L   x  Increases B LBP     Test Movements:   During: produces, abolishes, increases, decreases, no effect, centralizing, peripheralizing   After: better, worse, no better, no worse, no effect, centralized, peripheralized    Pretest symptoms standing:    Symptoms During Symptoms After ROM increased ROM decreased No Effect   FIS        Rep FIS        EIS        Rep EIS          Pretest symptoms lying: prone lying LBP 1/10    Symptoms During Symptoms After ROM increased ROM decreased No Effect   WALTER        Rep WALTER        EIL No Effect    No Effect         Rep EIL No Effect    Better    x       If required, pretest symptoms:    Symptoms During Symptoms After ROM increased ROM decreased No Effect   SGIS - R        Rep SGIS - R        SGIS - L        Rep SGIS - L          Static Tests:  Sitting slouched:     Sitting erect:    Standing slouched:   Standing erect:    Lying prone in extension:  Increases B LBP, NE Long sitting:      Other Tests:     Provisional Classification:  Derangement - Bilateral, symmetrical,  symptoms above knee    Principle of Management:  Education:  Posture--use of lumbar roll in sitting, avoidance of slouching, importance/impact of posture; POC, treatment rationale, expected response   Equipment provided:  none  Mechanical therapy (Y/N):  y   Extension principle:  REIL x10 reps, every 2 hours, may do NEERAJ when unable to lie down  Lateral Principle:    Flexion principle:    Other:      ASSESSMENT/PLAN:    Patient is a 66 year old male with lumbar complaints. Provisional classification of derangement with directional preference for extension.  He had decreased pain and improved ROM after repeated lumbar extension exercises and mobilizations.  He should make progress with treatment focusing on a lumbar extension program and posture correction to decrease pain and improve mobility and function.       Patient has the following significant findings with corresponding treatment plan.                Diagnosis 1:  Upper lumbar pain  Pain -  self management, education, directional preference exercise and home program  Decreased ROM/flexibility - manual therapy, therapeutic exercise and home program  Decreased function - therapeutic activities and home program  Impaired posture - neuro re-education and home program      Cumulative Therapy Evaluation is: Low complexity.    Previous and current functional limitations:  (See Goal Flow Sheet for this information)    Short term and Long term goals: (See Goal Flow Sheet for this information)     Communication ability:  Patient appears to be able to clearly communicate and understand verbal and written communication and follow directions correctly.  Treatment Explanation - The following has been discussed with the patient:   RX ordered/plan of care  Anticipated outcomes  Possible risks and side effects  This patient would benefit from PT intervention to resume normal activities.   Rehab potential is good.    Frequency:  1 X week, once daily  Duration:  for 6  weeks  Discharge Plan:  Achieve all LTG.  Independent in home treatment program.  Reach maximal therapeutic benefit.    Please refer to the daily flowsheet for treatment today, total treatment time and time spent performing 1:1 timed codes.

## 2021-10-21 ENCOUNTER — IMMUNIZATION (OUTPATIENT)
Dept: FAMILY MEDICINE | Facility: CLINIC | Age: 66
End: 2021-10-21
Payer: COMMERCIAL

## 2021-10-21 DIAGNOSIS — Z23 NEED FOR PROPHYLACTIC VACCINATION AND INOCULATION AGAINST INFLUENZA: Primary | ICD-10-CM

## 2021-10-21 PROCEDURE — 90682 RIV4 VACC RECOMBINANT DNA IM: CPT

## 2021-10-21 PROCEDURE — 90471 IMMUNIZATION ADMIN: CPT

## 2021-10-21 PROCEDURE — 99207 PR NO CHARGE NURSE ONLY: CPT

## 2021-12-14 NOTE — PROGRESS NOTES
Subjective:  HPI  Physical Exam  Oswestry Score: 10 %                 Objective:  System    Physical Exam    General      ROS    Assessment/Plan:    DISCHARGE REPORT    Progress reporting period is from 09/01/2021 to 09/01/2021.       SUBJECTIVE  Subjective changes noted by patient:  Patient was seen for initial evaluation of his LBP on 09/01/2021.  Current status is unknown as patient did not return for additional follow-up visits.  Current Pain level: Unknown as patient did not return for additional follow-up visits.  Initial Pain level: 3/10.   Changes in function:  Unknown as patient did not return for additional follow-up visits.  Adverse reaction to treatment or activity: Unknown as patient did not return for additional follow-up visits.    OBJECTIVE  Changes noted in objective findings:  Current objective measurements are unavailable as patient did not return for additional follow-up visits.        ASSESSMENT/PLAN  Patient was only seen for 1 visit with treatment focusing on lumbar extension exercises and mobilizations as well as posture training to address LBP.  He had decreased pain and improved ROM after repeated lumbar extension exercises and mobilizations at his initial visit.  He has not returned for additional follow-up visits so current assessment is unavailable.  Updated problem list and treatment plan: Diagnosis 1:  LBP. No updated problem list or treatment plan as patient did not return for additional visits and is discharged from PT at this time.    STG/LTGs have been met or progress has been made towards goals:  Unknown as patient did not return for additional follow-up visits.  Assessment of Progress: The patient has not returned to therapy. Current status is unknown.  Self Management Plans:  Patient has been instructed in a home treatment program.  Patient  has been instructed in self management of symptoms.  Jeffrey continues to require the following intervention to meet STG and LTG's:  PT  intervention is no longer required to meet STG/LTG.    Recommendations:  Patient is discharged from PT as he did not return for further follow-up visits.    Please refer to the daily flowsheet for treatment today, total treatment time and time spent performing 1:1 timed codes.

## 2022-02-12 ENCOUNTER — HEALTH MAINTENANCE LETTER (OUTPATIENT)
Age: 67
End: 2022-02-12

## 2022-04-26 ENCOUNTER — OFFICE VISIT (OUTPATIENT)
Dept: FAMILY MEDICINE | Facility: CLINIC | Age: 67
End: 2022-04-26
Payer: COMMERCIAL

## 2022-04-26 VITALS
OXYGEN SATURATION: 97 % | WEIGHT: 219 LBS | BODY MASS INDEX: 28.89 KG/M2 | DIASTOLIC BLOOD PRESSURE: 86 MMHG | SYSTOLIC BLOOD PRESSURE: 146 MMHG | HEART RATE: 69 BPM | RESPIRATION RATE: 16 BRPM | TEMPERATURE: 97.2 F

## 2022-04-26 DIAGNOSIS — Z12.11 SCREEN FOR COLON CANCER: ICD-10-CM

## 2022-04-26 DIAGNOSIS — R09.A2 GLOBUS SENSATION: Primary | ICD-10-CM

## 2022-04-26 PROCEDURE — 99214 OFFICE O/P EST MOD 30 MIN: CPT | Performed by: FAMILY MEDICINE

## 2022-04-26 ASSESSMENT — PAIN SCALES - GENERAL: PAINLEVEL: NO PAIN (0)

## 2022-04-26 NOTE — PROGRESS NOTES
"  Assessment & Plan     Screen for colon cancer    - Adult Gastro Ref - Procedure Only; Future    Globus sensation  Patient most likely has some irritation not necessarily any foreign body at this point.  However I suggested to ibuprofen to decrease inflammation and salt water gargles and if symptoms does not improve I would suggest to see ENT for more detailed evaluation.  Warning signs were discussed with the patient.  - Otolaryngology Referral; Future         BMI:   Estimated body mass index is 28.89 kg/m  as calculated from the following:    Height as of 8/24/21: 1.854 m (6' 1\").    Weight as of this encounter: 99.3 kg (219 lb).           Return in about 4 weeks (around 5/24/2022) for Physical Exam.    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  He exercises with enough effort to increase his heart rate 3 or less days per week.   He is taking medications regularly.  Patient was in [place] almost 2 weeks ago.  He ate some fish and he thinks he may have scratched his throat and since then he is feeling some globus sensation in the back of the throat.  He is able to eat without any pain denies any difficulty swallowing.  There is no difficulty breathing.        Review of Systems   Constitutional, HEENT, cardiovascular, pulmonary, gi and gu systems are negative, except as otherwise noted.      Objective    BP (!) 158/90   Pulse 69   " Temp 97.2  F (36.2  C) (Tympanic)   Resp 16   Wt 99.3 kg (219 lb)   SpO2 97%   BMI 28.89 kg/m    Body mass index is 28.89 kg/m .  Physical Exam   GENERAL: healthy, alert and no distress  NECK: no adenopathy, no asymmetry, masses, or scars and thyroid normal to palpation  RESP: lungs clear to auscultation - no rales, rhonchi or wheezes  CV: regular rate and rhythm, normal S1 S2, no S3 or S4, no murmur, click or rub, no peripheral edema and peripheral pulses strong  Throughout exam did not indicate any obvious foreign body.  The extent I can see

## 2022-04-28 ENCOUNTER — TELEPHONE (OUTPATIENT)
Dept: GASTROENTEROLOGY | Facility: CLINIC | Age: 67
End: 2022-04-28
Payer: COMMERCIAL

## 2022-04-28 NOTE — TELEPHONE ENCOUNTER
Screening Questions  BlueKIND OF PREP RedLOCATION [review exclusion criteria] GreenSEDATION TYPE  1. Have you had a positive covid test in the last 90 days? N     2. Do you have a legal guardian or medical Power of ?  Are you able to give consent for your medical care?Y (Sedation review/consideration needed)    3. Are you active on mychart? Y    4. What insurance is in the chart? PO    3.   Ordering/Referring Provider: Doyle Silva MD     4. BMI 28.89 [BMI OVER 40-EXTENDED PREP]  If greater than 40 review exclusion criteria [PAC APPT IF @ [facility]]        5.  Respiratory Screening :  [If yes to any of the following HOSPITAL setting only]     Do you use daily home oxygen? N  Do you have mod to severe Obstructive Sleep Apnea? Y But no longer uses machine  [OKAY @ [facility] SH PH [facility]]   Do you have Pulmonary Hypertension? N     Do you have UNCONTROLLED asthma? N        6.   Have you had a heart or lung transplant? N      7.   Are you currently on dialysis? N [ If yes, G-PREP & HOSPITAL setting only]     8.   Do you have chronic kidney disease? N [ If yes, G-PREP ]    9.   Have you had a stroke or Transient ischemic attack (TIA - aka  mini stroke ) within 6 months?  N (If yes, please review exclusion criteria)    10.   In the past 6 months, have you had any heart related issues including cardiomyopathy or heart attack? N           If yes, did it require cardiac stenting or other implantable device? N      11.   Do you have any implantable devices in your body (pacemaker, defib, LVAD)? N (If yes, please review exclusion criteria)    12.   Do you take nitroglycerin? N           If yes, how often? N  (if yes, HOSPITAL setting ONLY)    13.   Are you currently taking any blood thinners? N           [IF YES, INFORM PATIENT TO FOLLOW UP W/ ORDERING PROVIDER FOR BRIDGING INSTRUCTIONS]     14.   Do you have a diagnosis of diabetes? N   [ If yes, G-PREP ]    15.   [FEMALES] Are you currently pregnant?     If yes, how  many weeks?     16.   Are you taking any prescription pain medications on a routine schedule?  N  [ If yes, EXTENDED PREP.] [If yes, MAC]    17.   Do you have any chemical dependencies such as alcohol, street drugs, or methadone?  N [If yes, MAC]    18.   Do you have any history of post-traumatic stress syndrome, severe anxiety or history of psychosis?  N  [If yes, MAC]    19.   Do you transfer independently?  Y    20.  On a regular basis do you go 3-5 days between bowel movements? N   [ If yes, EXTENDED PREP.]    21.   Preferred LOCAL Pharmacy for Pre Prescription   [id number] - [place], [place] - [address] AT [facility] [address] [place] [address]      Scheduling Details      Caller : Jeffrey Conteh  (Please ask for phone number if not scheduled by patient)    Type of Procedure Scheduled: COLON  Which Colonoscopy Prep was Sent?: MIRLAX  KHORUTS CF PATIENTS & GROEN'S PATIENTS NEEDS EXTENDED PREP  Surgeon: NELSY  Date of Procedure: 6/2/22  Location:       Sedation Type: CS  Conscious Sedation- Needs  for 6 hours after the procedure  MAC/General-Needs  for 24 hours after procedure    Pre-op Required at [facility], [place], [place] and OR for MAC sedation:   (advise patient they will need a pre-op prior to procedure -)      Informed patient they will need an adult  Y  Cannot take any type of public or medical transportation alone    [facility] or Externally:  5/29    Confirmed Nurse will call to complete assessment Y    Additional comments:

## 2022-05-18 NOTE — PROGRESS NOTES
SUBJECTIVE:   CC: Jeffrey Conteh is an 66 year old male who presents for preventative health visit.       Patient has been advised of split billing requirements and indicates understanding: Yes  Healthy Habits:     Getting at least 3 servings of Calcium per day:  NO    Bi-annual eye exam:  Yes    Dental care twice a year:  Yes    Sleep apnea or symptoms of sleep apnea:  Sleep apnea    Diet:  Other    Frequency of exercise:  1 day/week    Duration of exercise:  Less than 15 minutes    Taking medications regularly:  Yes    Medication side effects:  None    PHQ-2 Total Score: 0    Additional concerns today:  Yes      Overall healthy blood pressure stable.  He feels there may be some indentation at the shaft of the penis but does not affect any erection.    Today's PHQ-2 Score:   PHQ-2 ( 1999 Pfizer) 5/23/2022   Q1: Little interest or pleasure in doing things 0   Q2: Feeling down, depressed or hopeless 0   PHQ-2 Score 0   PHQ-2 Total Score (12-17 Years)- Positive if 3 or more points; Administer PHQ-A if positive -   Q1: Little interest or pleasure in doing things Not at all   Q2: Feeling down, depressed or hopeless Not at all   PHQ-2 Score 0       Abuse: Current or Past(Physical, Sexual or Emotional)- No  Do you feel safe in your environment? Yes    Have you ever done Advance Care Planning? (For example, a Health Directive, POLST, or a discussion with a medical provider or your loved ones about your wishes): No, advance care planning information given to patient to review.  Patient plans to discuss their wishes with loved ones or provider.         Social History     Tobacco Use     Smoking status: Never Smoker     Smokeless tobacco: Never Used   Substance Use Topics     Alcohol use: Yes     Comment: less than weekly          Alcohol Use 5/23/2022   Prescreen: >3 drinks/day or >7 drinks/week? No   Prescreen: >3 drinks/day or >7 drinks/week? -       Last PSA:   PSA   Date Value Ref Range Status   09/19/2019 1.21 0 - 4  "ug/L Final     Comment:     Assay Method:  Chemiluminescence using Siemens Vista analyzer       Reviewed orders with patient. Reviewed health maintenance and updated orders accordingly - Yes  Lab work is in process    Reviewed and updated as needed this visit by clinical staff   Tobacco  Allergies  Meds                Reviewed and updated as needed this visit by Provider                       Review of Systems   Constitutional: Negative for chills and fever.   HENT: Negative for congestion, ear pain, hearing loss and sore throat.    Eyes: Negative for pain and visual disturbance.   Respiratory: Negative for cough and shortness of breath.    Cardiovascular: Negative for chest pain, palpitations and peripheral edema.   Gastrointestinal: Negative for abdominal pain, constipation, diarrhea, heartburn, hematochezia and nausea.   Genitourinary: Negative for dysuria, frequency, genital sores, hematuria, impotence, penile discharge and urgency.   Musculoskeletal: Positive for myalgias. Negative for arthralgias and joint swelling.   Skin: Negative for rash.   Neurological: Negative for dizziness, weakness, headaches and paresthesias.   Psychiatric/Behavioral: Negative for mood changes. The patient is not nervous/anxious.          OBJECTIVE:   /80   Pulse 83   Temp 96.9  F (36.1  C) (Tympanic)   Resp 16   Ht 1.85 m (6' 0.84\")   Wt 97.5 kg (215 lb)   SpO2 97%   BMI 28.49 kg/m      Physical Exam  GENERAL: healthy, alert and no distress  EYES: Eyes grossly normal to inspection, PERRL and conjunctivae and sclerae normal  HENT: ear canals and TM's normal, nose and mouth without ulcers or lesions  NECK: no adenopathy, no asymmetry, masses, or scars and thyroid normal to palpation  RESP: lungs clear to auscultation - no rales, rhonchi or wheezes  CV: regular rate and rhythm, normal S1 S2, no S3 or S4, no murmur, click or rub, no peripheral edema and peripheral pulses strong  ABDOMEN: soft, nontender, no " "hepatosplenomegaly, no masses and bowel sounds normal   (male): normal male genitalia without lesions or urethral discharge, no hernia  MS: no gross musculoskeletal defects noted, no edema  SKIN: no suspicious lesions or rashes  NEURO: Normal strength and tone, mentation intact and speech normal  PSYCH: mentation appears normal, affect normal/bright    Diagnostic Test Results:  Labs reviewed in Epic    ASSESSMENT/PLAN:   Jeffrey was seen today for physical.    Diagnoses and all orders for this visit:    Screening for prostate cancer  -     PSA, screen; Future  -     PSA, screen    Encounter for annual physical exam  -     Comprehensive metabolic panel; Future  -     Lipid Profile; Future  -     PSA, screen; Future  -     Comprehensive metabolic panel  -     Lipid Profile  -     PSA, screen  Discussed with him about penile exam there is no obvious abnormality I could not appreciate any abnormality however if he feels there is any difficulty with erection or pain he will let us know we can get him see urologist.  Screening for HIV (human immunodeficiency virus)  -     HIV Antigen Antibody Combo; Future  -     HIV Antigen Antibody Combo    Screen for colon cancer  Planning to get colonoscopy in the next few days.  Essential hypertension  -     amLODIPine (NORVASC) 5 MG tablet; TAKE 1 TABLET(5 MG) BY MOUTH DAILY  -     Comprehensive metabolic panel; Future  -     Comprehensive metabolic panel    Lipid screening  -     Comprehensive metabolic panel; Future  -     Lipid Profile; Future  -     Comprehensive metabolic panel  -     Lipid Profile        Patient has been advised of split billing requirements and indicates understanding: Yes    COUNSELING:   Reviewed preventive health counseling, as reflected in patient instructions       Regular exercise       Healthy diet/nutrition    Estimated body mass index is 28.49 kg/m  as calculated from the following:    Height as of this encounter: 1.85 m (6' 0.84\").    Weight as of " this encounter: 97.5 kg (215 lb).         He reports that he has never smoked. He has never used smokeless tobacco.      Counseling Resources:  ATP IV Guidelines  Pooled Cohorts Equation Calculator  FRAX Risk Assessment  ICSI Preventive Guidelines  Dietary Guidelines for Americans, 2010  USDA's MyPlate  ASA Prophylaxis  Lung CA Screening    Doyle Silva MD  [facility]

## 2022-05-23 ASSESSMENT — ENCOUNTER SYMPTOMS
PARESTHESIAS: 0
HEADACHES: 0
CONSTIPATION: 0
CHILLS: 0
NAUSEA: 0
ARTHRALGIAS: 0
HEARTBURN: 0
HEMATOCHEZIA: 0
SORE THROAT: 0
PALPITATIONS: 0
JOINT SWELLING: 0
MYALGIAS: 1
ABDOMINAL PAIN: 0
NERVOUS/ANXIOUS: 0
DIARRHEA: 0
EYE PAIN: 0
DYSURIA: 0
WEAKNESS: 0
FEVER: 0
COUGH: 0
FREQUENCY: 0
SHORTNESS OF BREATH: 0
HEMATURIA: 0
DIZZINESS: 0

## 2022-05-24 ENCOUNTER — OFFICE VISIT (OUTPATIENT)
Dept: FAMILY MEDICINE | Facility: CLINIC | Age: 67
End: 2022-05-24
Payer: COMMERCIAL

## 2022-05-24 VITALS
WEIGHT: 215 LBS | HEIGHT: 73 IN | DIASTOLIC BLOOD PRESSURE: 80 MMHG | RESPIRATION RATE: 16 BRPM | HEART RATE: 83 BPM | SYSTOLIC BLOOD PRESSURE: 132 MMHG | OXYGEN SATURATION: 97 % | BODY MASS INDEX: 28.49 KG/M2 | TEMPERATURE: 96.9 F

## 2022-05-24 DIAGNOSIS — Z13.220 LIPID SCREENING: ICD-10-CM

## 2022-05-24 DIAGNOSIS — Z11.4 SCREENING FOR HIV (HUMAN IMMUNODEFICIENCY VIRUS): ICD-10-CM

## 2022-05-24 DIAGNOSIS — Z12.5 SCREENING FOR PROSTATE CANCER: Primary | ICD-10-CM

## 2022-05-24 DIAGNOSIS — I10 ESSENTIAL HYPERTENSION: ICD-10-CM

## 2022-05-24 DIAGNOSIS — Z12.11 SCREEN FOR COLON CANCER: ICD-10-CM

## 2022-05-24 DIAGNOSIS — Z00.00 ENCOUNTER FOR ANNUAL PHYSICAL EXAM: ICD-10-CM

## 2022-05-24 LAB
ALBUMIN SERPL-MCNC: 3.9 G/DL (ref 3.4–5)
ALP SERPL-CCNC: 91 U/L (ref 40–150)
ALT SERPL W P-5'-P-CCNC: 31 U/L (ref 0–70)
ANION GAP SERPL CALCULATED.3IONS-SCNC: 7 MMOL/L (ref 3–14)
AST SERPL W P-5'-P-CCNC: 20 U/L (ref 0–45)
BILIRUB SERPL-MCNC: 0.6 MG/DL (ref 0.2–1.3)
BUN SERPL-MCNC: 12 MG/DL (ref 7–30)
CALCIUM SERPL-MCNC: 9.1 MG/DL (ref 8.5–10.1)
CHLORIDE BLD-SCNC: 107 MMOL/L (ref 94–109)
CHOLEST SERPL-MCNC: 201 MG/DL
CO2 SERPL-SCNC: 26 MMOL/L (ref 20–32)
CREAT SERPL-MCNC: 0.97 MG/DL (ref 0.66–1.25)
FASTING STATUS PATIENT QL REPORTED: YES
GFR SERPL CREATININE-BSD FRML MDRD: 87 ML/MIN/1.73M2
GLUCOSE BLD-MCNC: 101 MG/DL (ref 70–99)
HDLC SERPL-MCNC: 57 MG/DL
HIV 1+2 AB+HIV1 P24 AG SERPL QL IA: NONREACTIVE
LDLC SERPL CALC-MCNC: 125 MG/DL
NONHDLC SERPL-MCNC: 144 MG/DL
POTASSIUM BLD-SCNC: 4 MMOL/L (ref 3.4–5.3)
PROT SERPL-MCNC: 7.6 G/DL (ref 6.8–8.8)
PSA SERPL-MCNC: 0.69 UG/L (ref 0–4)
SODIUM SERPL-SCNC: 140 MMOL/L (ref 133–144)
TRIGL SERPL-MCNC: 93 MG/DL

## 2022-05-24 PROCEDURE — 36415 COLL VENOUS BLD VENIPUNCTURE: CPT | Performed by: FAMILY MEDICINE

## 2022-05-24 PROCEDURE — 87389 HIV-1 AG W/HIV-1&-2 AB AG IA: CPT | Performed by: FAMILY MEDICINE

## 2022-05-24 PROCEDURE — 80061 LIPID PANEL: CPT | Performed by: FAMILY MEDICINE

## 2022-05-24 PROCEDURE — 99213 OFFICE O/P EST LOW 20 MIN: CPT | Mod: 25 | Performed by: FAMILY MEDICINE

## 2022-05-24 PROCEDURE — 99386 PREV VISIT NEW AGE 40-64: CPT | Performed by: FAMILY MEDICINE

## 2022-05-24 PROCEDURE — 80053 COMPREHEN METABOLIC PANEL: CPT | Performed by: FAMILY MEDICINE

## 2022-05-24 PROCEDURE — G0103 PSA SCREENING: HCPCS | Performed by: FAMILY MEDICINE

## 2022-05-24 RX ORDER — AMLODIPINE BESYLATE 5 MG/1
TABLET ORAL
Qty: 90 TABLET | Refills: 3 | Status: SHIPPED | OUTPATIENT
Start: 2022-05-24 | End: 2023-05-25

## 2022-05-24 ASSESSMENT — ENCOUNTER SYMPTOMS
PALPITATIONS: 0
ABDOMINAL PAIN: 0
DIARRHEA: 0
NERVOUS/ANXIOUS: 0
HEMATOCHEZIA: 0
COUGH: 0
JOINT SWELLING: 0
SORE THROAT: 0
CHILLS: 0
SHORTNESS OF BREATH: 0
HEADACHES: 0
FREQUENCY: 0
DYSURIA: 0
HEMATURIA: 0
HEARTBURN: 0
DIZZINESS: 0
NAUSEA: 0
ARTHRALGIAS: 0
EYE PAIN: 0
MYALGIAS: 1
FEVER: 0
WEAKNESS: 0
PARESTHESIAS: 0
CONSTIPATION: 0

## 2022-05-24 ASSESSMENT — PAIN SCALES - GENERAL: PAINLEVEL: NO PAIN (0)

## 2022-05-25 DIAGNOSIS — Z11.59 ENCOUNTER FOR SCREENING FOR OTHER VIRAL DISEASES: Primary | ICD-10-CM

## 2022-05-27 ENCOUNTER — TRANSFERRED RECORDS (OUTPATIENT)
Dept: HEALTH INFORMATION MANAGEMENT | Facility: CLINIC | Age: 67
End: 2022-05-27
Payer: COMMERCIAL

## 2022-05-29 ENCOUNTER — LAB (OUTPATIENT)
Dept: LAB | Facility: CLINIC | Age: 67
End: 2022-05-29
Payer: COMMERCIAL

## 2022-05-29 DIAGNOSIS — Z11.59 ENCOUNTER FOR SCREENING FOR OTHER VIRAL DISEASES: ICD-10-CM

## 2022-05-29 PROCEDURE — U0003 INFECTIOUS AGENT DETECTION BY NUCLEIC ACID (DNA OR RNA); SEVERE ACUTE RESPIRATORY SYNDROME CORONAVIRUS 2 (SARS-COV-2) (CORONAVIRUS DISEASE [COVID-19]), AMPLIFIED PROBE TECHNIQUE, MAKING USE OF HIGH THROUGHPUT TECHNOLOGIES AS DESCRIBED BY CMS-2020-01-R: HCPCS

## 2022-06-01 LAB — SARS-COV-2 RNA RESP QL NAA+PROBE: NEGATIVE

## 2022-06-02 ENCOUNTER — HOSPITAL ENCOUNTER (OUTPATIENT)
Facility: CLINIC | Age: 67
Discharge: HOME | End: 2022-06-02
Attending: COLON & RECTAL SURGERY | Admitting: COLON & RECTAL SURGERY
Payer: COMMERCIAL

## 2022-06-02 VITALS
BODY MASS INDEX: 28.49 KG/M2 | DIASTOLIC BLOOD PRESSURE: 99 MMHG | RESPIRATION RATE: 14 BRPM | SYSTOLIC BLOOD PRESSURE: 146 MMHG | HEIGHT: 73 IN | HEART RATE: 71 BPM | WEIGHT: 215 LBS | OXYGEN SATURATION: 97 %

## 2022-06-02 LAB — COLONOSCOPY: NORMAL

## 2022-06-02 PROCEDURE — 88305 TISSUE EXAM BY PATHOLOGIST: CPT | Mod: 26 | Performed by: PATHOLOGY

## 2022-06-02 PROCEDURE — 88305 TISSUE EXAM BY PATHOLOGIST: CPT | Mod: TC | Performed by: COLON & RECTAL SURGERY

## 2022-06-02 PROCEDURE — 45385 COLONOSCOPY W/LESION REMOVAL: CPT | Mod: PT | Performed by: COLON & RECTAL SURGERY

## 2022-06-02 PROCEDURE — 250N000011 HC RX IP 250 OP 636: Performed by: COLON & RECTAL SURGERY

## 2022-06-02 PROCEDURE — G0500 MOD SEDAT ENDO SERVICE >5YRS: HCPCS | Performed by: COLON & RECTAL SURGERY

## 2022-06-02 RX ORDER — LIDOCAINE 40 MG/G
CREAM TOPICAL
Status: DISCONTINUED | OUTPATIENT
Start: 2022-06-02 | End: 2022-06-02 | Stop reason: HOSPADM

## 2022-06-02 RX ORDER — FENTANYL CITRATE 50 UG/ML
INJECTION, SOLUTION INTRAMUSCULAR; INTRAVENOUS PRN
Status: COMPLETED | OUTPATIENT
Start: 2022-06-02 | End: 2022-06-02

## 2022-06-02 RX ORDER — ONDANSETRON 2 MG/ML
4 INJECTION INTRAMUSCULAR; INTRAVENOUS
Status: DISCONTINUED | OUTPATIENT
Start: 2022-06-02 | End: 2022-06-02 | Stop reason: HOSPADM

## 2022-06-02 RX ADMIN — MIDAZOLAM 2 MG: 1 INJECTION INTRAMUSCULAR; INTRAVENOUS at 09:12

## 2022-06-02 RX ADMIN — FENTANYL CITRATE 100 MCG: 50 INJECTION, SOLUTION INTRAMUSCULAR; INTRAVENOUS at 09:12

## 2022-06-02 NOTE — H&P
Colon & Rectal Surgery History and Physical  Pre-Endoscopy Procedure Note    History of Present Illness   I have been asked by Dr. Silva to evaluate this 66 year old male for colorectal cancer screening. He had a normal screening colonoscopy in  2011 and currently denies any abdominal pain, weight loss, bleeding per rectum, or recent change in bowel habits.    Past Medical History  Diagnosis Date     Arthritis      Hypertension        Past Surgical History  Procedure Laterality Date     APPENDECTOMY  2006     COMBINED CYSTOSCOPY, INSERT STENT URETER(S) Right 10/15/2020    Procedure: CYSTOSCOPY, WITH RIGHT URETERAL STENT PLACEMENT, RIGHT RETROGRADE PYLEOGRAM, RIGHT URETERAL STONE DISPACEMENT WITHOUT REMOVAL;  Surgeon: Lucio Butt MD;  Location: [facility] OR     EXTRACORPOREAL SHOCK WAVE LITHOTRIPSY (ESWL) Right 11/10/2020    Procedure: LITHOTRIPSY, EXTRACORPOREAL SHOCK WAVE (ESWL);  Surgeon: Lucio Butt MD;  Location: [facility] OR     SEPTOPLASTY       SOFT TISSUE SURGERY  2004    Left achilles tendon     TOTAL HIP ARTHROPLASTY  2005        Medications  Medication Sig     amLODIPine (NORVASC) 5 MG tablet TAKE 1 TABLET(5 MG) BY MOUTH DAILY     Cholecalciferol (VITAMIN D) 1000 UNITS capsule Take 1 capsule by mouth every morning      multivitamin w/minerals (THERA-VIT-M) tablet Take 1 tablet by mouth every morning Senior 55 plus     naproxen (NAPROSYN) 500 MG tablet Take 500 mg by mouth 2 times daily as needed for moderate pain        Allergies   No Known Allergies     Family History   Family history includes Anxiety Disorder in his son; Cancer in his father, maternal grandmother, and maternal uncle; Dementia in his father; Depression in his mother; Hypertension in his father; Other Cancer in his mother; Ovarian Cancer in his mother; Parkinsonism in his father.     Social History   He reports that he has never smoked. He has never used smokeless tobacco. He reports current alcohol use. He reports that he does  "not use drugs.    Review of Systems   Constitutional:  No fever, weight change or fatigue.    Eyes:     No dry eyes or vision changes.   Ears/Nose/Throat/Neck:  No oral ulcers, sore throat or voice change.    Cardiovascular:   No palpitations, syncope, angina or edema.   Respiratory:    No chest pain, excessive sleepiness, shortness of breath or hemoptysis.    Gastrointestinal:   No abdominal pain, nausea, vomiting, diarrhea or heartburn.    Genitourinary:   No dysuria, hematuria, urinary retention or urinary frequency.   Musculoskeletal:  No joint swelling or arthralgias.    Dermatologic:  No skin rash or other skin changes.   Neurologic:    No focal weakness or numbness. No neuropathy.   Psychiatric:    No depression, anxiety, suicidal ideation, or paranoid ideation.   Endocrine:   No cold or heat intolerance, polydipsia, hirsutism, change in libido, or flushing.   Hematology/Lymphatic:  No bleeding or lymphadenopathy.    Allergy/Immunology:  No rhinitis or hives.     Physical Exam   Vitals:  /84 RR 12 HR 64 SpO2 100% room air Height 1.854 m (6' 1\"), weight 97.5 kg (215 lb).    General:  Alert and oriented to person, place and time   Airway: Normal oropharyngeal airway and neck mobility   Lungs:  Clear bilaterally   Heart:  Regular rate and rhythm   Abdomen: Soft, NT, ND, no masses   Extremities: Warm, good capillary refill    ASA Grade: II (mild systemic disease)    Impression: Cleared for use of conscious sedation for colorectal cancer screening    Plan: Proceed with colonoscopy     Allyson Arciniega MD  [facility]  [phone]  "

## 2022-06-03 LAB
PATH REPORT.COMMENTS IMP SPEC: NORMAL
PATH REPORT.COMMENTS IMP SPEC: NORMAL
PATH REPORT.FINAL DX SPEC: NORMAL
PATH REPORT.GROSS SPEC: NORMAL
PATH REPORT.MICROSCOPIC SPEC OTHER STN: NORMAL
PATH REPORT.RELEVANT HX SPEC: NORMAL
PHOTO IMAGE: NORMAL

## 2022-06-07 ENCOUNTER — TELEPHONE (OUTPATIENT)
Dept: FAMILY MEDICINE | Facility: CLINIC | Age: 67
End: 2022-06-07
Payer: COMMERCIAL

## 2022-06-07 ENCOUNTER — TELEPHONE (OUTPATIENT)
Dept: UROLOGY | Facility: CLINIC | Age: 67
End: 2022-06-07

## 2022-06-07 DIAGNOSIS — N48.89 PAIN, PENILE: Primary | ICD-10-CM

## 2022-06-07 NOTE — TELEPHONE ENCOUNTER
Pt calling and would like a referral to Urology. He has a hard spot on his penis and would like to have it further evaluated. Please place referral if appropriate. Thank you.  Calista Lopez,

## 2022-06-07 NOTE — TELEPHONE ENCOUNTER
Patient is calling back to check on status of referral for priority visit of 3-5 days. Please reach out to patient to schedule.  Thank you.

## 2022-06-07 NOTE — TELEPHONE ENCOUNTER
[facility] Call Center    Phone Message    May a detailed message be left on voicemail: yes     Reason for Call: Other: Patient is being referred to Urology for urgent appointment in 3-5 days for penile pain.Please triage and call patient to schedule.     Action Taken: Message routed to:  [facility] ([facility] Urology     Travel Screening: Not Applicable

## 2022-06-21 ENCOUNTER — OFFICE VISIT (OUTPATIENT)
Dept: UROLOGY | Facility: CLINIC | Age: 67
End: 2022-06-21
Payer: COMMERCIAL

## 2022-06-21 VITALS
WEIGHT: 218 LBS | HEIGHT: 73 IN | HEART RATE: 90 BPM | DIASTOLIC BLOOD PRESSURE: 80 MMHG | OXYGEN SATURATION: 99 % | BODY MASS INDEX: 28.89 KG/M2 | SYSTOLIC BLOOD PRESSURE: 130 MMHG

## 2022-06-21 DIAGNOSIS — N48.6 PEYRONIE'S DISEASE: Primary | ICD-10-CM

## 2022-06-21 DIAGNOSIS — Z87.442 HISTORY OF KIDNEY STONES: ICD-10-CM

## 2022-06-21 PROCEDURE — 99214 OFFICE O/P EST MOD 30 MIN: CPT | Performed by: UROLOGY

## 2022-06-21 ASSESSMENT — PAIN SCALES - GENERAL: PAINLEVEL: NO PAIN (0)

## 2022-06-21 NOTE — LETTER
6/21/2022       RE: Jeffrey Conteh  [address]  [place] [address]     Dear Colleague,    Thank you for referring your patient, Jeffrey Conteh, to the [facility]. Please see a copy of my visit note below.    Office Visit Note  [facility]  [phone]    UROLOGIC DIAGNOSES:   History of stones    CURRENT INTERVENTIONS:       HISTORY:   This is a 67-year-old gentleman who had previously seen Dr. Butt for treatment of kidney stones.  He has had no problems with stones recently and is here for a new issue.  He has noted a lump in the penis and some penile curvature.  He says that he has not found it bothersome or painful but he wanted to make certain there were no underlying health issues.  He has a normal PSA.  He first noticed the lump underneath the skin of the penis and the curvature about 3 to 4 months ago.      PAST MEDICAL HISTORY:   Past Medical History:   Diagnosis Date     Arthritis      Hypertension        PAST SURGICAL HISTORY:   Past Surgical History:   Procedure Laterality Date     ABDOMEN SURGERY  2006     APPENDECTOMY      2006 along with diverticulities      COLONOSCOPY       COLONOSCOPY N/A 6/2/2022    Procedure: COLONOSCOPY, FLEXIBLE, WITH LESION REMOVAL USING SNARE;  Surgeon: Allyson Arciniega MD;  Location:  GI     COMBINED CYSTOSCOPY, INSERT STENT URETER(S) Right 10/15/2020    Procedure: CYSTOSCOPY, WITH RIGHT URETERAL STENT PLACEMENT, RIGHT RETROGRADE PYLEOGRAM, RIGHT URETERAL STONE DISPACEMENT WITHOUT REMOVAL;  Surgeon: Lucio Butt MD;  Location:  OR     EXTRACORPOREAL SHOCK WAVE LITHOTRIPSY (ESWL) Right 11/10/2020    Procedure: LITHOTRIPSY, EXTRACORPOREAL SHOCK WAVE (ESWL);  Surgeon: Lucio Butt MD;  Location:  OR     SEPTOPLASTY       SOFT TISSUE SURGERY  2004    Left achilles tendon     ZZC TOTAL HIP ARTHROPLASTY      2005       FAMILY  "HISTORY:   Family History   Problem Relation Age of Onset     Hypertension Father      Parkinsonism Father      Dementia Father      Cancer Father         Skin     Ovarian Cancer Mother      Other Cancer Mother      Depression Mother      Cancer Maternal Grandmother         brain tumor      Cancer Maternal Uncle         brain tumor      Anxiety Disorder Son        SOCIAL HISTORY:   Social History     Socioeconomic History     Marital status:      Spouse name:      Number of children: 2     Years of education: None     Highest education level: None   Occupational History     Occupation: group home    Tobacco Use     Smoking status: Never Smoker     Smokeless tobacco: Never Used   Substance and Sexual Activity     Alcohol use: Yes     Comment: less than weekly      Drug use: No     Sexual activity: Not Currently   Other Topics Concern     Parent/sibling w/ CABG, MI or angioplasty before 65F 55M? No       Review Of Systems:  Skin: No rash, pruritis, or skin pigmentation  Eyes: No changes in vision  Ears/Nose/Throat: No changes in hearing, no nosebleeds  Respiratory: No shortness of breath, dyspnea on exertion, cough, or hemoptysis  Cardiovascular: No chest pain or palpitations  Gastrointestinal: No diarrhea or constipation. No abdominal pain. No hematochezia  Genitourinary: see HPI  Musculoskeletal: No pain or swelling of joints, normal range of motion  Neurologic: No weakness or tremors  Psychiatric: No recent changes in memory or mood  Hematologic/Lymphatic/Immunologic: No easy bruising or enlarged lymph nodes  Endocrine: No weight gain or loss      PHYSICAL EXAM:    /80 (BP Location: Left arm, Patient Position: Sitting, Cuff Size: Adult Regular)   Pulse 90   Ht 1.854 m (6' 1\")   Wt 98.9 kg (218 lb)   SpO2 99%   BMI 28.76 kg/m      Constitutional: Well developed. Conversant and in no acute distress  Eyes: Anicteric sclera, conjunctiva clear, normal extraocular movements  ENT: Normocephalic " and atraumatic,   Skin: Warm and dry. No rashes or lesions  Cardiac: No peripheral edema  Back/Flank: Not done  CNS/PNS: Normal musculature and movements, moves all extremities normally  Respiratory: Normal non-labored breathing  Abdomen: Soft nontender and nondistended  Peripheral Vascular: No peripheral edema  Mental Status/Psych: Alert and Oriented x 3. Normal mood and affect    Penis: He has a very small and subtle Peyronie's plaque on the dorsum of the penis near the base of the penis.  Scrotal skin: Normal, no lesions  Testicles: Normal to palpation bilaterally  Epididymis: Normal to palpation bilaterally  Lymphatic: Normal inguinal lymph nodes  Digital Rectal Exam:     Cystoscopy: Not done    Imaging: None    Urinalysis: UA RESULTS:  Recent Labs   Lab Test 10/20/20  1557   COLOR Yellow   APPEARANCE Clear   URINEGLC Negative   URINEBILI Negative   URINEKETONE Negative   SG 1.025   UBLD Large*   URINEPH 7.0   PROTEIN 30*   UROBILINOGEN 0.2   NITRITE Negative   LEUKEST Small*   RBCU *   WBCU 5-10*       PSA: 0.69    Post Void Residual:     Other labs: None today      IMPRESSION:  Peyronie's disease    PLAN:  He has a very small and subtle Peyronie's plaque on the dorsum of the penis near the base of the penis.  This has resulted in some mild upward curvature of the penis.  We discussed the natural history of Peyronie's disease and treatment options that may be employed.  He does not think the plaque size or the curvature has gotten any worse over the past few months.  If things remain stable I recommended continued observation.  If the curvature or size of the plaque does become worse in the future he should come back and see me again for another exam.      Gennaro Perez M.D.

## 2022-06-21 NOTE — PROGRESS NOTES
Office Visit Note  [facility]  [phone]    UROLOGIC DIAGNOSES:   History of stones    CURRENT INTERVENTIONS:       HISTORY:   This is a 67-year-old gentleman who had previously seen Dr. Butt for treatment of kidney stones.  He has had no problems with stones recently and is here for a new issue.  He has noted a lump in the penis and some penile curvature.  He says that he has not found it bothersome or painful but he wanted to make certain there were no underlying health issues.  He has a normal PSA.  He first noticed the lump underneath the skin of the penis and the curvature about 3 to 4 months ago.      PAST MEDICAL HISTORY:   Past Medical History:   Diagnosis Date     Arthritis      Hypertension        PAST SURGICAL HISTORY:   Past Surgical History:   Procedure Laterality Date     ABDOMEN SURGERY  2006     APPENDECTOMY      2006 along with diverticulities      COLONOSCOPY       COLONOSCOPY N/A 6/2/2022    Procedure: COLONOSCOPY, FLEXIBLE, WITH LESION REMOVAL USING SNARE;  Surgeon: Allyson Arciniega MD;  Location:  GI     COMBINED CYSTOSCOPY, INSERT STENT URETER(S) Right 10/15/2020    Procedure: CYSTOSCOPY, WITH RIGHT URETERAL STENT PLACEMENT, RIGHT RETROGRADE PYLEOGRAM, RIGHT URETERAL STONE DISPACEMENT WITHOUT REMOVAL;  Surgeon: Lucio Butt MD;  Location:  OR     EXTRACORPOREAL SHOCK WAVE LITHOTRIPSY (ESWL) Right 11/10/2020    Procedure: LITHOTRIPSY, EXTRACORPOREAL SHOCK WAVE (ESWL);  Surgeon: Lucio Butt MD;  Location:  OR     SEPTOPLASTY       SOFT TISSUE SURGERY  2004    Left achilles tendon     ZZC TOTAL HIP ARTHROPLASTY      2005       FAMILY HISTORY:   Family History   Problem Relation Age of Onset     Hypertension Father      Parkinsonism Father      Dementia Father      Cancer Father         Skin     Ovarian Cancer Mother      Other Cancer Mother      Depression Mother      Cancer Maternal Grandmother         brain tumor      Cancer Maternal Uncle      "    brain tumor      Anxiety Disorder Son        SOCIAL HISTORY:   Social History     Socioeconomic History     Marital status:      Spouse name:      Number of children: 2     Years of education: None     Highest education level: None   Occupational History     Occupation: group home    Tobacco Use     Smoking status: Never Smoker     Smokeless tobacco: Never Used   Substance and Sexual Activity     Alcohol use: Yes     Comment: less than weekly      Drug use: No     Sexual activity: Not Currently   Other Topics Concern     Parent/sibling w/ CABG, MI or angioplasty before 65F 55M? No       Review Of Systems:  Skin: No rash, pruritis, or skin pigmentation  Eyes: No changes in vision  Ears/Nose/Throat: No changes in hearing, no nosebleeds  Respiratory: No shortness of breath, dyspnea on exertion, cough, or hemoptysis  Cardiovascular: No chest pain or palpitations  Gastrointestinal: No diarrhea or constipation. No abdominal pain. No hematochezia  Genitourinary: see HPI  Musculoskeletal: No pain or swelling of joints, normal range of motion  Neurologic: No weakness or tremors  Psychiatric: No recent changes in memory or mood  Hematologic/Lymphatic/Immunologic: No easy bruising or enlarged lymph nodes  Endocrine: No weight gain or loss      PHYSICAL EXAM:    /80 (BP Location: Left arm, Patient Position: Sitting, Cuff Size: Adult Regular)   Pulse 90   Ht 1.854 m (6' 1\")   Wt 98.9 kg (218 lb)   SpO2 99%   BMI 28.76 kg/m      Constitutional: Well developed. Conversant and in no acute distress  Eyes: Anicteric sclera, conjunctiva clear, normal extraocular movements  ENT: Normocephalic and atraumatic,   Skin: Warm and dry. No rashes or lesions  Cardiac: No peripheral edema  Back/Flank: Not done  CNS/PNS: Normal musculature and movements, moves all extremities normally  Respiratory: Normal non-labored breathing  Abdomen: Soft nontender and nondistended  Peripheral Vascular: No peripheral " edema  Mental Status/Psych: Alert and Oriented x 3. Normal mood and affect    Penis: He has a very small and subtle Peyronie's plaque on the dorsum of the penis near the base of the penis.  Scrotal skin: Normal, no lesions  Testicles: Normal to palpation bilaterally  Epididymis: Normal to palpation bilaterally  Lymphatic: Normal inguinal lymph nodes  Digital Rectal Exam:     Cystoscopy: Not done    Imaging: None    Urinalysis: UA RESULTS:  Recent Labs   Lab Test 10/20/20  1557   COLOR Yellow   APPEARANCE Clear   URINEGLC Negative   URINEBILI Negative   URINEKETONE Negative   SG 1.025   UBLD Large*   URINEPH 7.0   PROTEIN 30*   UROBILINOGEN 0.2   NITRITE Negative   LEUKEST Small*   RBCU *   WBCU 5-10*       PSA: 0.69    Post Void Residual:     Other labs: None today      IMPRESSION:  Peyronie's disease    PLAN:  He has a very small and subtle Peyronie's plaque on the dorsum of the penis near the base of the penis.  This has resulted in some mild upward curvature of the penis.  We discussed the natural history of Peyronie's disease and treatment options that may be employed.  He does not think the plaque size or the curvature has gotten any worse over the past few months.  If things remain stable I recommended continued observation.  If the curvature or size of the plaque does become worse in the future he should come back and see me again for another exam.      Gennaro Perez M.D.

## 2022-06-21 NOTE — NURSING NOTE
Chief Complaint   Patient presents with     penile cyst     Has had since about March. He states it is a possible cyst. Patient denies pain.   Jennifer Choi LPN

## 2022-07-25 ENCOUNTER — OFFICE VISIT (OUTPATIENT)
Dept: URGENT CARE | Facility: URGENT CARE | Age: 67
End: 2022-07-25
Payer: COMMERCIAL

## 2022-07-25 ENCOUNTER — NURSE TRIAGE (OUTPATIENT)
Dept: FAMILY MEDICINE | Facility: CLINIC | Age: 67
End: 2022-07-25

## 2022-07-25 VITALS
HEART RATE: 74 BPM | OXYGEN SATURATION: 98 % | DIASTOLIC BLOOD PRESSURE: 94 MMHG | SYSTOLIC BLOOD PRESSURE: 160 MMHG | TEMPERATURE: 98.2 F

## 2022-07-25 DIAGNOSIS — S90.852A FOREIGN BODY IN LEFT FOOT, INITIAL ENCOUNTER: Primary | ICD-10-CM

## 2022-07-25 PROCEDURE — 99213 OFFICE O/P EST LOW 20 MIN: CPT

## 2022-07-25 NOTE — TELEPHONE ENCOUNTER
Patient called the clinic reporting he stepped a piece of glass yesterday morning. He feels that there is still a little piece of glass stuck near his toe pinky on the left foot. Able to walk on it, some pain when leaning foot to the side. Would like to take it out either today or tomorrow. He does not feel that it will come out by itself as he is walking on it.     Need second level triage:   Provider Response to 2nd Level Triage Request    Can leave a detailed VM on cell phone.     Reason for Disposition   FB has a gema (e.g., fish hook)   Caller can't get FB out and it's causing pain   FB is clear (glass or plastic)    Additional Information   Negative: Sounds like a life-threatening emergency to the triager   Negative: Puncture wound (and no current foreign body)   Negative: SEVERE pain (e.g., excruciating)   Negative: Deeply embedded FB (e.g., needle or toothpick in foot)   Negative: Sounds like a serious injury to the triager   Negative: Caller reluctant to take out FB and it's causing pain    Protocols used: SKIN FOREIGN BODY-A-OH

## 2022-07-26 NOTE — TELEPHONE ENCOUNTER
Please provide with that patient next available same-day slot.  If there is any acute pain he should go to urgent care to get it checked.

## 2022-07-26 NOTE — PROGRESS NOTES
HPI  Patient is a 67-year-old male who presents after stepping on a piece of glass.  It is at the base of the fifth toe on the left foot.  Tetanus is up-to-date    ROS  Foreign body foot    Past Medical History:   Diagnosis Date     Arthritis      Hypertension      Patient Active Problem List   Diagnosis     Sleep apnea     Diverticulitis of colon     Hyperlipidemia LDL goal <130     Neck arthritis     Rosacea     Right Achilles tendinitis     Acute kidney injury (H)     Right ureteral stone     Right renal stone     Essential hypertension     Acute bilateral low back pain without sciatica      Past Surgical History:   Procedure Laterality Date     ABDOMEN SURGERY  2006     APPENDECTOMY      2006 along with diverticulities      COLONOSCOPY       COLONOSCOPY N/A 6/2/2022    Procedure: COLONOSCOPY, FLEXIBLE, WITH LESION REMOVAL USING SNARE;  Surgeon: Allyson Arciniega MD;  Location:  GI     COMBINED CYSTOSCOPY, INSERT STENT URETER(S) Right 10/15/2020    Procedure: CYSTOSCOPY, WITH RIGHT URETERAL STENT PLACEMENT, RIGHT RETROGRADE PYLEOGRAM, RIGHT URETERAL STONE DISPACEMENT WITHOUT REMOVAL;  Surgeon: Lucio Butt MD;  Location:  OR     EXTRACORPOREAL SHOCK WAVE LITHOTRIPSY (ESWL) Right 11/10/2020    Procedure: LITHOTRIPSY, EXTRACORPOREAL SHOCK WAVE (ESWL);  Surgeon: Lucio Butt MD;  Location:  OR     SEPTOPLASTY       SOFT TISSUE SURGERY  2004    Left achilles tendon     ZZC TOTAL HIP ARTHROPLASTY      2005     No Known Allergies  Current Outpatient Medications   Medication     amLODIPine (NORVASC) 5 MG tablet     Cholecalciferol (VITAMIN D) 1000 UNITS capsule     multivitamin w/minerals (THERA-VIT-M) tablet     naproxen (NAPROSYN) 500 MG tablet     No current facility-administered medications for this visit.         Physical Exam  Vitals and nursing note reviewed.   Constitutional:       General: He is not in acute distress.     Comments: BP (!) 160/94   Pulse 74   Temp 98.2  F (36.8   C) (Oral)   SpO2 98%      HENT:      Head: Normocephalic.   Cardiovascular:      Rate and Rhythm: Normal rate.   Pulmonary:      Effort: Pulmonary effort is normal.   Musculoskeletal:        Feet:    Feet:      Comments: Very superficial piece of glass is embedded in the left foot.  This was easily removed with a forcep.  Site washed and swabbed with iodine.  No dressing indicated.  Skin:     Capillary Refill: Capillary refill takes less than 2 seconds.   Neurological:      Mental Status: He is alert.       Assessment:  1. Foreign body in left foot, initial encounter        Plan:  Tetanus is up to date   Instructions regarding self-care of patient/child reviewed.   Written instructions provided in after visit summary and reviewed.  Patient instructed to see primary care provider for new or persistent symptoms.   Red flag symptoms reviewed and patient has been instructed to seek emergent care      The use of Dragon/Animalvitae dictation services may have been used to construct the content in this note;   any grammatical or spelling errors are non-intentional. Please contact the author of this note directly if you   are in need of any clarification.     Nancy Pham, DNP, APRN, CNP

## 2022-07-28 ENCOUNTER — TRANSFERRED RECORDS (OUTPATIENT)
Dept: HEALTH INFORMATION MANAGEMENT | Facility: CLINIC | Age: 67
End: 2022-07-28

## 2022-10-09 ENCOUNTER — HEALTH MAINTENANCE LETTER (OUTPATIENT)
Age: 67
End: 2022-10-09

## 2022-10-13 ENCOUNTER — NURSE TRIAGE (OUTPATIENT)
Dept: FAMILY MEDICINE | Facility: CLINIC | Age: 67
End: 2022-10-13

## 2022-10-13 NOTE — TELEPHONE ENCOUNTER
Spoke with patient and gave him the in person visit with Dr. Silva.   He will do another home Covid test tomorrow am prior to his in coming appointment.     Patient was told to stay well hydrated and take OTC  Fever reduction medication if he is running a fever.     His symptoms are not acute, he has had fatigue for awhile. He is just worried this could be another kidney stone like he had in 2020.   He will stay home from work and rest.     Katty Mehta RN  -[facility]

## 2022-10-13 NOTE — TELEPHONE ENCOUNTER
We can call this patient tomorrow at 1130 same day slot.  Today is no available in person visit.  If is not acute symptoms he can go to ER otherwise we will see him tomorrow at 1130 same-day slot if he is willing to come

## 2022-10-13 NOTE — TELEPHONE ENCOUNTER
Patient called in concerned that he is experiencing a kidney stone like he did in 2020 and he needed emergent surgery    Patient states he started feeling fatigued on Sunday or Monday and since Monday has had a low grade fever between  degrees.   Patient scheduled for virtual visit today but patient is wondering if he can come in person for this or what he should do as he feels he needs an Xray again    No urinary symptoms present     Reason for Disposition    MODERATE weakness (i.e., interferes with work, school, normal activities) and persists > 3 days    Additional Information    Negative: MODERATE weakness (i.e., interferes with work, school, normal activities) and cause unknown  (Exceptions: Weakness with acute minor illness, or weakness from poor fluid intake.)    Negative: Fever > 103 F  (39.4 C) and not able to get the Fever down using CARE ADVICE    Negative: Fever > 100.0 F (37.8 C) and bedridden (e.g., nursing home patient, stroke, chronic illness, recovering from surgery)    Negative: Fever > 101 F (38.3 C) and over 60 years of age    Negative: Fever > 100.0 F (37.8 C) and diabetes mellitus or weak immune system (e.g., HIV positive, cancer chemo, splenectomy, organ transplant, chronic steroids)    Negative: Pale skin (pallor)    Negative: MODERATE weakness from poor fluid intake with no improvement after 2 hours of rest and fluids    Negative: Drinking very little and dehydration suspected (e.g., no urine > 12 hours, very dry mouth, very lightheaded)    Negative: Patient sounds very sick or weak to the triager    Negative: Difficulty breathing    Negative: Heart beating < 50 beats per minute OR > 140 beats per minute    Negative: Extra heartbeats OR irregular heart beating (i.e., 'palpitations')    Negative: Follows bleeding (e.g., from vomiting, rectum, vagina)  (Exception: Small transient weakness from sight of a small amount blood.)    Negative: Bloody, black, or tarry bowel movements   (Exception: Chronic-unchanged black-grey bowel movements and is taking iron pills or Pepto-Bismol.)    Negative: Weakness of the face, arm or leg on one side of the body    Negative: Has diabetes mellitus and weakness from low blood sugar (i.e., < 60 mg/dL or 3.5 mmol/L)    Negative: Recent heat exposure, suspected cause of weakness    Negative: Vomiting is main symptom    Negative: Diarrhea is main symptom    Negative: SEVERE difficulty breathing (e.g., struggling for each breath, speaks in single words)    Negative: Shock suspected (e.g., cold/pale/clammy skin, too weak to stand, low BP, rapid pulse)    Negative: Difficult to awaken or acting confused (e.g., disoriented, slurred speech)    Negative: Fainted > 15 minutes ago and still feels too weak or dizzy to stand    Negative: SEVERE weakness (i.e., unable to walk or barely able to walk, requires support) and new-onset or worsening    Negative: Sounds like a life-threatening emergency to the triager    Protocols used: WEAKNESS (GENERALIZED) AND FATIGUE-A-OH

## 2022-10-13 NOTE — TELEPHONE ENCOUNTER
Also advised patient to do home COVID test as he has low-grade fever before coming tomorrow if he is willing to come tomorrow.

## 2022-10-14 ENCOUNTER — OFFICE VISIT (OUTPATIENT)
Dept: FAMILY MEDICINE | Facility: CLINIC | Age: 67
End: 2022-10-14
Payer: COMMERCIAL

## 2022-10-14 VITALS
BODY MASS INDEX: 29.42 KG/M2 | RESPIRATION RATE: 14 BRPM | TEMPERATURE: 96.4 F | SYSTOLIC BLOOD PRESSURE: 138 MMHG | DIASTOLIC BLOOD PRESSURE: 82 MMHG | HEART RATE: 77 BPM | WEIGHT: 223 LBS | OXYGEN SATURATION: 98 %

## 2022-10-14 DIAGNOSIS — R53.83 OTHER FATIGUE: Primary | ICD-10-CM

## 2022-10-14 LAB
ALBUMIN UR-MCNC: NEGATIVE MG/DL
APPEARANCE UR: CLEAR
BACTERIA #/AREA URNS HPF: ABNORMAL /HPF
BILIRUB UR QL STRIP: NEGATIVE
COLOR UR AUTO: YELLOW
ERYTHROCYTE [DISTWIDTH] IN BLOOD BY AUTOMATED COUNT: 13 % (ref 10–15)
GLUCOSE UR STRIP-MCNC: NEGATIVE MG/DL
HCT VFR BLD AUTO: 43.8 % (ref 40–53)
HGB BLD-MCNC: 15 G/DL (ref 13.3–17.7)
HGB UR QL STRIP: ABNORMAL
KETONES UR STRIP-MCNC: NEGATIVE MG/DL
LEUKOCYTE ESTERASE UR QL STRIP: NEGATIVE
MCH RBC QN AUTO: 29.7 PG (ref 26.5–33)
MCHC RBC AUTO-ENTMCNC: 34.2 G/DL (ref 31.5–36.5)
MCV RBC AUTO: 87 FL (ref 78–100)
MUCOUS THREADS #/AREA URNS LPF: PRESENT /LPF
NITRATE UR QL: NEGATIVE
PH UR STRIP: 7 [PH] (ref 5–7)
PLATELET # BLD AUTO: 249 10E3/UL (ref 150–450)
RBC # BLD AUTO: 5.05 10E6/UL (ref 4.4–5.9)
RBC #/AREA URNS AUTO: ABNORMAL /HPF
SP GR UR STRIP: 1.02 (ref 1–1.03)
SQUAMOUS #/AREA URNS AUTO: ABNORMAL /LPF
UROBILINOGEN UR STRIP-ACNC: 0.2 E.U./DL
WBC # BLD AUTO: 6.6 10E3/UL (ref 4–11)
WBC #/AREA URNS AUTO: ABNORMAL /HPF

## 2022-10-14 PROCEDURE — 36415 COLL VENOUS BLD VENIPUNCTURE: CPT | Performed by: FAMILY MEDICINE

## 2022-10-14 PROCEDURE — 81001 URINALYSIS AUTO W/SCOPE: CPT | Performed by: FAMILY MEDICINE

## 2022-10-14 PROCEDURE — 85027 COMPLETE CBC AUTOMATED: CPT | Performed by: FAMILY MEDICINE

## 2022-10-14 PROCEDURE — 99214 OFFICE O/P EST MOD 30 MIN: CPT | Performed by: FAMILY MEDICINE

## 2022-10-14 ASSESSMENT — PAIN SCALES - GENERAL: PAINLEVEL: MILD PAIN (2)

## 2022-10-14 NOTE — PROGRESS NOTES
Assessment & Plan     Other fatigue  Patient is currently asymptomatic his clinical exam is normal he is worried about kidney stone however he does not have any signs or symptoms of kidney stone.  Suggested to get some blood work done and urine to make sure there is no abnormality.  Normal I would suggest to continue observation watch for any signs or symptoms of any worsening symptoms.  - CBC with platelets; Future  - UA with Microscopic reflex to Culture - lab collect; Future  - CBC with platelets  - UA with Microscopic reflex to Culture - lab collect  956}         Return in about 2 weeks (around 10/28/2022) for if symptom does not get better.    Doyle Silva MD  [facility] KANU Murphy is a 67 year old, presenting for the following health issues:  Fatigue  Patient noticed 1 to 2 days of low-grade fever and some fatigue.  It has been improving he gets his COVID test twice which is negative.  Denies any chest pain or shortness of breath he has history of kidney stone and he is worried about that.  He has not had any abdominal pain or urinary symptoms.  He did some hiking prior to that which cause some muscle fatigue tiredness.    History of Present Illness       Reason for visit:  Persistent fatigue and mild fever  Symptom onset:  3-7 days ago  Symptoms include:  Fatigue and fever  Symptom intensity:  Mild  Symptom progression:  Improving  Had these symptoms before:  Yes  Has tried/received treatment for these symptoms:  Yes  Previous treatment was successful:  Yes  Prior treatment description:  Emergency surgery to unblock kidney stone  What makes it worse:  No  What makes it better:  Yes    He eats 2-3 servings of fruits and vegetables daily.He consumes 0 sweetened beverage(s) daily.He exercises with enough effort to increase his heart rate 20 to 29 minutes per day.  He exercises with enough effort to increase his heart rate 5 days per week.   He is taking medications  regularly.           Review of Systems   Constitutional, HEENT, cardiovascular, pulmonary, gi and gu systems are negative, except as otherwise noted.      Objective    BP (!) 150/84   Pulse 77   Temp (!) 96.4  F (35.8  C) (Tympanic)   Resp 14   Wt 101.2 kg (223 lb)   SpO2 98%   BMI 29.42 kg/m    Body mass index is 29.42 kg/m .  Physical Exam   GENERAL: healthy, alert and no distress  NECK: no adenopathy, no asymmetry, masses, or scars and thyroid normal to palpation  RESP: lungs clear to auscultation - no rales, rhonchi or wheezes  CV: regular rate and rhythm, normal S1 S2, no S3 or S4, no murmur, click or rub, no peripheral edema and peripheral pulses strong  ABDOMEN: soft, nontender, no hepatosplenomegaly, no masses and bowel sounds normal  MS: no gross musculoskeletal defects noted, no edema

## 2022-10-21 ENCOUNTER — TELEPHONE (OUTPATIENT)
Dept: FAMILY MEDICINE | Facility: CLINIC | Age: 67
End: 2022-10-21

## 2022-10-21 RX ORDER — NAPROXEN 500 MG/1
500 TABLET ORAL 2 TIMES DAILY PRN
Status: CANCELLED | OUTPATIENT
Start: 2022-10-21

## 2022-10-21 NOTE — TELEPHONE ENCOUNTER
New Medication Request    What medication are you requesting?:   Naproxen Sodium 500mg      Reason for medication request:   Pt was prescribed medication by Dr. Jason He ([facility]) who did his hip replacement surgery. Dr. He is now retiring and pt is wondering if Dr. Silva can take over the prescription. Hip replacement surgery was part of pt's Workman's Compensation because he was injured at work. Naproxen Sodium was prescribed by Dr. He for pt to take when he does rigorous exercise and is covered by workman's compensation as well. Pt is aware that he can buy medication OTC, but would like to keep the medication connected to workman's compensation case.     Have you taken this medication before?:   Yes: Was prescribed a lifetime supply by Dr. Jason He, orthopedic surgeon, who is retiring. Pt does not want to have to make an appointment to establish a relationship with a new orthopedic surgeon.    Controlled Substance Agreement on file:   CSA -- Patient Level:    CSA: None found at the patient level.       Patient offered an appointment? No    Preferred Pharmacy:   [id number] - [place], [place] - [address] AT [facility] [address]  [address] [facility] [address]  Phone: [phone] Fax: [phone]    Could we send this information to you in [facility] or would you prefer to receive a phone call?:   Patient would prefer a phone call  at Cell number on file:    Telephone Information:   Mobile [phone]     Shaista Mahoney,  [facility]

## 2022-10-23 NOTE — TELEPHONE ENCOUNTER
This medication is not for life time, it is available over the counter, can use it if needed.  Never been dicussed in the past, if refill is needed, needs follow up, with next available  Do not use same day slot..

## 2022-10-24 NOTE — TELEPHONE ENCOUNTER
Patient Contact    Attempt # 1    Was call answered?  No.  Left message on voicemail with information to call triage back at [phone], option 2.     On call back:     PCP message below.    Sharla KNOWLES RN  EP Triage

## 2022-10-24 NOTE — TELEPHONE ENCOUNTER
Patient returning missed call. Relayed providers message to patient.   Patient received letter from past surgeon stating Naproxen is a medication he will need as a lifetime drug as a result of a work-related injurt. Patient says he did not discuss naproxen with PCP in the past because it was being managed by surgeon that has now retired. Patient declines scheduling an appointment at this time, says he doesn't want to take up PCP's time and feels the appointment would be a waste of resources. Patient will think about PCP's message and call clinic if he decides to schedule appointment.

## 2022-11-01 ENCOUNTER — VIRTUAL VISIT (OUTPATIENT)
Dept: FAMILY MEDICINE | Facility: CLINIC | Age: 67
End: 2022-11-01
Payer: COMMERCIAL

## 2022-11-01 ENCOUNTER — TELEPHONE (OUTPATIENT)
Dept: NURSING | Facility: CLINIC | Age: 67
End: 2022-11-01

## 2022-11-01 DIAGNOSIS — U07.1 INFECTION DUE TO 2019 NOVEL CORONAVIRUS: Primary | ICD-10-CM

## 2022-11-01 PROCEDURE — 99213 OFFICE O/P EST LOW 20 MIN: CPT | Mod: TEL | Performed by: FAMILY MEDICINE

## 2022-11-01 NOTE — PROGRESS NOTES
Jeffrey is a 67 year old who is being evaluated via a billable telephone visit.      What phone number would you like to be contacted at? [phone]  How would you like to obtain your AVS? MyChart    Assessment & Plan     Infection due to 2019 novel coronavirus  Patient is at risk due to age.  Discussed oral antiviral agents.  He will use the paxlovid medication.  Discussed side effects and drug interactions.    - nirmatrelvir and ritonavir (PAXLOVID) therapy pack; Take 3 tablets by mouth 2 times daily for 5 days (Take 2 Nirmatrelvir tablets and 1 Ritonavir tablet twice daily for 5 days)                 Return in about 5 days (around 11/6/2022) for If not better.    Jason Mar MD  [facility]    Subjective   Jeffrey is a 67 year old, presenting for the following health issues:  Covid Concern      HPI       COVID-19 Symptom Review  How many days ago did these symptoms start? Yesterday morning, home test positive yesterday    Are any of the following symptoms significant for you?    New or worsening difficulty breathing? No    Worsening cough? Does have a cough, sometimes productive, was worse yesterday    Fever or chills? Yes, the highest temperature was 99.4, some chills yesterday    Headache: No    Sore throat: No    Chest pain: No    Diarrhea: No    Body aches? No    What treatments has patient tried? claritin   Does patient live in a nursing home, group home, or shelter? No, does live in apartment  Does patient have a way to get food/medications during quarantined? Yes, I have a friend or family member who can help me.                  Review of Systems         Objective           Vitals:  No vitals were obtained today due to virtual visit.    Physical Exam   healthy, alert and no distress  PSYCH: Alert and oriented times 3; coherent speech, normal   rate and volume, able to articulate logical thoughts, able   to abstract reason, no tangential thoughts, no hallucinations   or delusions   His affect is normal  RESP: No cough, no audible wheezing, able to talk in full sentences  Remainder of exam unable to be completed due to telephone visits                Phone call duration: 8 minutes

## 2022-11-01 NOTE — PATIENT INSTRUCTIONS
Please take paxlovid medication.    Please also cut back your amlodipine to 2.5 mg for 8 days due to drug interaction with the paxlovid.          Thank you for choosing [facility].  You may be receiving an email and/or telephone survey request from [facility] [employer] regarding your visit today.  Please take a few minutes to respond to the survey to let us know how we are doing.      If you have questions or concerns, please contact us via [employer] or you can contact your care team at [phone] option 2.    Our Clinic hours are:  Monday - Thursday 7am-6pm  Friday 7am-5pm    The [place] outpatient lab hours are:  Monday - Friday 7am-4:30pm    Appointments are required, call [phone]    If you have clinical questions after hours or would like to schedule an appointment,  call the clinic at [phone].

## 2022-11-01 NOTE — TELEPHONE ENCOUNTER
Patient states that he has an appointment this afternoon for COVID 19. States that he just wanted to make sure that Dr. Silva agreed with him seeking treatment. Advised anti-viral is indicated if age 65 or older.  Sent COVID 19 patient instructions through [employer]. Daniela Moody RN

## 2022-11-01 NOTE — TELEPHONE ENCOUNTER
Patient interested in Paxlovid treatment but has concerns and questions regarding rebound effect. Would appreciate call from PCP or care team.      Coronavirus (COVID-19) Notification    Caller Name (Patient, parent, daughter/son, grandparent, etc)  Jeffrey Conteh, patient    Reason for call positive Covid test  Notify of Positive Coronavirus (COVID-19) lab results, assess symptoms,  review [facility] recommendations    Gather patient reported symptoms      Assessment  Current Symptoms at time of phone call, reported by patient: (if no symptoms, document No symptoms]    Fever, fatigue, cough productive, runny nose, burning eyes, sore throat  Date of Symptom(s) onset (if applicable)    10/31/2022    If at time of call, Patients symptoms hare worsened, the Patient should contact 911 or have someone drive them to Emergency Dept promptly:         If Patient calling 911, inform 911 personal that you have tested positive for the Coronavirus (COVID-19).  Place mask on and await 911 to arrive.       If Emergency Dept, If possible, please have another adult drive you to the Emergency Dept but you need to wear mask when in contact with other people.      Is the patient symptomatic and onset of symptoms within the last 7 days?  Yes  Is the patient interested in a visit with a provider to discuss treatment options?: Yes  Is the patient seen at [facility]?  No: Warm transfer caller to [phone] to be scheduled with a virtual urgent provider.  During transfer, instruct  on appropriate time frame for visit     Review information with Patient    Your result was positive. This means you have COVID-19 (coronavirus).      How can I protect others?    These guidelines are for isolating before returning to work, school or .          If you DO have symptoms:      o    Stay home and away from others          ?    For at least 5 days after your symptoms started, AND           ?    You are fever free for  24 hours (with no medicine that reduces fever), AND          ?    Your other symptoms are better.      o    Wear a mask for 10 full days any time you are around others.       If you DON'T have symptoms:      o    Stay at home and away from others for at least 5 days after your positive test.      o    Wear a mask for 10 full days any time you are around others.      You will receive a positive COVID-19 letter via [employer] or the mail soon with additional self-care information.      Would you like me to review some of that information with you now?  Yes    How can I take care of myself?      Get lots of rest. Drink extra fluids (unless a doctor has told you not to).      Take Tylenol (acetaminophen) for fever or pain. If you have liver or kidney problems, ask your family doctor if it's okay to take Tylenol.     Take either:     650 mg (two 325 mg pills) every 4 to 6 hours, or     1,000 mg (two 500 mg pills) every 8 hours as needed.     Note: Do not take more than 3,000 mg in one day. Acetaminophen is found in many medicines (both prescribed and over-the-counter medicines). Read all labels to be sure you don't take too much.          Know when to call 911: Emergency warning signs include:    Trouble breathing or shortness of breath    Pain or pressure in the chest that doesn't go away    Feeling confused like you haven't felt before, or not being able to wake up    Bluish-colored lips or face        Moriah Wu RN

## 2022-11-01 NOTE — TELEPHONE ENCOUNTER
Called patient, transferred call to covid response team.     Ines Hector/Deyvi-  [facility]

## 2022-11-04 ENCOUNTER — TELEPHONE (OUTPATIENT)
Dept: FAMILY MEDICINE | Facility: CLINIC | Age: 67
End: 2022-11-04

## 2022-11-04 NOTE — TELEPHONE ENCOUNTER
Received a call from the patient with questions regarding his COVID isolation period. Patient reports that he lives with his 2 sons and they are all currently Covid positive. Patient reports that isolations periods for his sons and himself are 1 and 2 days apart. Patient wondering if he is able to end his isolation after day 5 as originally planned.     Triage RN recommended that patient and sons isolate from each other at home. Advised patient that as long as he is not having symptoms and is afebrile, he should be wearing a mask around others when he goes out. Pt reports that they all have N95 masks and will begin wearing them around each other. Pt agreed with plan. No additional questions at this time.     Hillary Figueroa RN

## 2022-12-15 ENCOUNTER — TRANSFERRED RECORDS (OUTPATIENT)
Dept: HEALTH INFORMATION MANAGEMENT | Facility: CLINIC | Age: 67
End: 2022-12-15

## 2023-01-17 NOTE — NURSING NOTE
Chief Complaint   Patient presents with     kidney stone     cystoscopy stent out today   Prior to the start of the procedure and with procedural staff participation, I verbally confirmed the patient s identity using two indicators, relevant allergies, that the procedure was appropriate and matched the consent or emergent situation, and that the correct equipment/implants were available. Immediately prior to starting the procedure I conducted the Time Out with the procedural staff and re-confirmed the patient s name, procedure, and site/side. I have wiped the patient off with the povidone-Iodine solution, draped them,  used Lidocaine hydrochloride jelly, and instilled sterile water into the bladder. (The Joint Commission universal protocol was followed.)  Yes    Sedation (Moderate or Deep): None  5mL 2% lidocaine hydrochloride Urojet instilled into urethra.    NDC# [address]-8  Lot #: [id number]  Expiration Date:  6-22  Jennifer Choi LPN     98

## 2023-04-05 ENCOUNTER — NURSE TRIAGE (OUTPATIENT)
Dept: FAMILY MEDICINE | Facility: CLINIC | Age: 68
End: 2023-04-05

## 2023-04-05 NOTE — TELEPHONE ENCOUNTER
CC: Patient calling reporting testing positive for covid-19    COVID-19 DIAGNOSIS: at home test positive today   COVID-19 EXPOSURE: unknown   ONSET: Monday   WORST SYMPTOM: fatigue and sore throat   COUGH: mild   FEVER: 98.6 - 2 hours ago   RESPIRATORY STATUS: denies any SOB or chest pain   BETTER-SAME-WORSE: same   HIGH RISK DISEASE: yes, age 67   VACCINE: yes   BOOSTER: 3 boosters   OTHER SYMPTOMS: reports fatigue, sore throat, muscle pain. Denies loss of taste or smell   O2 SATURATION: does not have     RN COVID TREATMENT VISIT  04/05/23      The patient has been triaged and does not require a higher level of care.    Jeffrey Conteh  67 year old  Current weight? 223    Has the patient been seen by a primary care provider at an [facility] or [facility] within the past two years? Yes.   Have you been in close proximity to/do you have a known exposure to a person with a confirmed case of influenza? No.     General treatment eligibility:  Date of positive COVID test (PCR or at home)?  4/5/23    Are you or have you been hospitalized for this COVID-19 infection? No.   Have you received monoclonal antibodies or antiviral treatment for COVID-19 since this positive test? No.   Do you have any of the following conditions that place you at risk of being very sick from COVID-19?   - Age 50 years or older  Yes, patient has at least one high risk condition as noted above.     Current COVID symptoms:   - cough  - fatigue  - muscle or body aches  - sore throat  - congestion or runny nose  Yes. Patient has at least one symptom as selected.     How many days since symptoms started? 5 days or less. Established patient, 12 years or older weighing at least 88.2 lbs, who has symptoms that started in the past 5 days, has not been hospitalized nor received treatment already, and is at risk for being very sick from COVID-19.     Treatment eligibility by RN:    Are you currently pregnant or nursing? No    Do  you have a clinically significant hypersensitivity to nirmatrelvir or ritonavir, or toxic epidermal necrolysis (TEN) or Saunders-Bryce Syndrome? No    Do you have a history of hepatitis, any hepatic impairment on the Problem List (such as Child-Mensah Class C, cirrhosis, fatty liver disease, alcoholic liver disease), or was the last liver lab (hepatic panel, ALT, AST, ALK Phos, bilirubin) elevated in the past 6 months? No    Do you have any history of severe renal impairment (eGFR < 30mL/min)? No    Is patient eligible to continue?   Yes, patient meets all eligibility requirements for the RN COVID treatment (as denoted by all no responses above).     Current Outpatient Medications   Medication Sig Dispense Refill     amLODIPine (NORVASC) 5 MG tablet TAKE 1 TABLET(5 MG) BY MOUTH DAILY 90 tablet 3     Cholecalciferol (VITAMIN D) 1000 UNITS capsule Take 1 capsule by mouth every morning        multivitamin w/minerals (THERA-VIT-M) tablet Take 1 tablet by mouth every morning Senior 55 plus       naproxen (NAPROSYN) 500 MG tablet Take 500 mg by mouth 2 times daily as needed for moderate pain          Medications from List 1 of the standing order (on medications that exclude the use of Paxlovid) that patient is taking: NONE. Is patient taking [place]'s Wort? No  Is patient taking [place]'s Wort or any meds from List 1? No.   Medications from List 2 of the standing order (on meds that provider needs to adjust) that patient is taking: amlodipine (Norvasc), explained a provider visit is necessary to discuss medication adjustments while taking Paxlovid. Is patient on any of the meds from List 2? Yes. Writer scheduled patient for covid virtual treatment appointment at next available per patient request:    4/5/2023 5:00 PM  VIRTUAL CARE PROVIDER [facility] [place]     Also reviewed below information with patient:  How do I self-isolate?  You isolate when you have symptoms of COVID or a test  shows you have COVID, even if you don t have symptoms.     If you DO have symptoms:    Stay home and away from others    For at least 5 days after your symptoms started, AND     You are fever free for 24 hours (with no medicine that reduces fever), AND    Your other symptoms are better.    Wear a mask for 10 full days any time you are around others.    Know when to call 911. Emergency warning signs include:    Trouble breathing or shortness of breath    Pain or pressure in the chest that doesn't go away    Feeling confused like you haven't felt before, or not being able to wake up    Bluish-colored lips or face    Patient agreeable and expressed verbal understanding of above information. No further questions or concerns at this time. Signing encounter.    Mina Chinchilla RN  [facility]    Reason for Disposition    [1] HIGH RISK for severe COVID complications (e.g., weak immune system, age > 64 years, obesity with BMI of 30 or higher, pregnant, chronic lung disease or other chronic medical condition) AND [2] COVID symptoms (e.g., cough, fever)  (Exceptions: Already seen by PCP and no new or worsening symptoms.)    Additional Information    Negative: SEVERE difficulty breathing (e.g., struggling for each breath, speaks in single words)    Negative: Difficult to awaken or acting confused (e.g., disoriented, slurred speech)    Negative: Bluish (or gray) lips or face now    Negative: Shock suspected (e.g., cold/pale/clammy skin, too weak to stand, low BP, rapid pulse)    Negative: Sounds like a life-threatening emergency to the triager    Negative: [1] Diagnosed or suspected COVID-19 AND [2] symptoms lasting 3 or more weeks    Negative: [1] COVID-19 exposure AND [2] no symptoms    Negative: COVID-19 vaccine reaction suspected (e.g., fever, headache, muscle aches) occurring 1 to 3 days after getting vaccine    Negative: COVID-19 vaccine, questions about    Negative: [1] Lives with someone known to  have influenza (flu test positive) AND [2] flu-like symptoms (e.g., cough, runny nose, sore throat, SOB; with or without fever)    Negative: [1] Adult with possible COVID-19 symptoms AND [2] triager concerned about severity of symptoms or other causes    Negative: COVID-19 and breastfeeding, questions about    Negative: SEVERE or constant chest pain or pressure  (Exception: Mild central chest pain, present only when coughing.)    Negative: MODERATE difficulty breathing (e.g., speaks in phrases, SOB even at rest, pulse 100-120)    Negative: Headache and stiff neck (can't touch chin to chest)    Negative: Oxygen level (e.g., pulse oximetry) 90 percent or lower    Negative: Chest pain or pressure  (Exception: MILD central chest pain, present only when coughing)    Negative: Patient sounds very sick or weak to the triager    Negative: MILD difficulty breathing (e.g., minimal/no SOB at rest, SOB with walking, pulse <100)    Negative: Fever > 103 F (39.4 C)    Negative: [1] Fever > 101 F (38.3 C) AND [2] over 60 years of age    Negative: [1] Fever > 100.0 F (37.8 C) AND [2] bedridden (e.g., nursing home patient, CVA, chronic illness, recovering from surgery)    Protocols used: CORONAVIRUS (COVID-19) DIAGNOSED OR [id number]

## 2023-04-19 ENCOUNTER — TRANSFERRED RECORDS (OUTPATIENT)
Dept: HEALTH INFORMATION MANAGEMENT | Facility: CLINIC | Age: 68
End: 2023-04-19
Payer: COMMERCIAL

## 2023-04-24 ENCOUNTER — PATIENT OUTREACH (OUTPATIENT)
Dept: CARE COORDINATION | Facility: CLINIC | Age: 68
End: 2023-04-24
Payer: COMMERCIAL

## 2023-05-08 ENCOUNTER — PATIENT OUTREACH (OUTPATIENT)
Dept: CARE COORDINATION | Facility: CLINIC | Age: 68
End: 2023-05-08
Payer: COMMERCIAL

## 2023-06-13 ENCOUNTER — NURSE TRIAGE (OUTPATIENT)
Dept: FAMILY MEDICINE | Facility: CLINIC | Age: 68
End: 2023-06-13
Payer: COMMERCIAL

## 2023-06-13 NOTE — TELEPHONE ENCOUNTER
"Patient called and stated that he suddenly started getting back pain on his left side for the past week. Patient stated that the pain comes and goes and is about an 8/10 when present. Patient called today because pain has started come more frequently and happening about 10 or more times an hour. Patient was advised per protocol to be seen in the ED. Patient agreed to this plan and had no further questions.    Reason for Disposition    SEVERE back pain of sudden onset and age > 60 years    Additional Information    Negative: Passed out (i.e., fainted, collapsed and was not responding)    Negative: Shock suspected (e.g., cold/pale/clammy skin, too weak to stand, low BP, rapid pulse)    Negative: Sounds like a life-threatening emergency to the triager    Negative: Major injury to the back (e.g., MVA, fall > 10 feet or 3 meters, penetrating injury, etc.)    Negative: Pain in the upper back over the ribs (rib cage) that radiates (travels) into the chest    Negative: Pain in the upper back over the ribs (rib cage) and worsened by coughing (or clearly increases with breathing)    Negative: Back pain during pregnancy    Answer Assessment - Initial Assessment Questions  1. ONSET: \"When did the pain begin?\"         6/6/23    2. LOCATION: \"Where does it hurt?\" (upper, mid or lower back)        Left Lower back above hip bone    3. SEVERITY: \"How bad is the pain?\"  (e.g., Scale 1-10; mild, moderate, or severe)    - MILD (1-3): doesn't interfere with normal activities     - MODERATE (4-7): interferes with normal activities or awakens from sleep     - SEVERE (8-10): excruciating pain, unable to do any normal activities         8/10    4. PATTERN: \"Is the pain constant?\" (e.g., yes, no; constant, intermittent)         Intermittent    5. RADIATION: \"Does the pain shoot into your legs or elsewhere?\"        Radiates further up his back    6. CAUSE:  \"What do you think is causing the back pain?\"         Unsure    7. BACK OVERUSE:  \"Any " "recent lifting of heavy objects, strenuous work or exercise?\"        Has been gardening for the past couple days    8. MEDICATIONS: \"What have you taken so far for the pain?\" (e.g., nothing, acetaminophen, NSAIDS)        Has not taken anything    9. NEUROLOGIC SYMPTOMS: \"Do you have any weakness, numbness, or problems with bowel/bladder control?\"        None    10. OTHER SYMPTOMS: \"Do you have any other symptoms?\" (e.g., fever, abdominal pain, burning with urination, blood in urine)          None    11. PREGNANCY: \"Is there any chance you are pregnant?\" (e.g., yes, no; LMP)          N/A    Protocols used: BACK PAIN-A-OH    GO TO ED NOW:   * You need to be seen in the Emergency Department.  * Go to the ED at [facility].  * Leave now. Drive carefully.      CALL BACK IF:  * Fever occurs  * Numbness or weakness occurs, or bowel/bladder problems  * Pain begins to shoot into the leg  * Pain persists over 2 weeks  * Pain becomes worse  * You become worse        Patient/Caregiver understands and will follow care advice? Yes, plans to follow advice     Luciana RODRIGUEZ [facility] Triage Team    "

## 2023-06-14 ENCOUNTER — TELEPHONE (OUTPATIENT)
Dept: FAMILY MEDICINE | Facility: CLINIC | Age: 68
End: 2023-06-14

## 2023-06-14 ENCOUNTER — ANCILLARY PROCEDURE (OUTPATIENT)
Dept: ULTRASOUND IMAGING | Facility: CLINIC | Age: 68
End: 2023-06-14
Attending: NURSE PRACTITIONER
Payer: COMMERCIAL

## 2023-06-14 ENCOUNTER — OFFICE VISIT (OUTPATIENT)
Dept: URGENT CARE | Facility: URGENT CARE | Age: 68
End: 2023-06-14
Payer: COMMERCIAL

## 2023-06-14 VITALS
DIASTOLIC BLOOD PRESSURE: 92 MMHG | WEIGHT: 228 LBS | TEMPERATURE: 97.6 F | HEART RATE: 70 BPM | OXYGEN SATURATION: 97 % | SYSTOLIC BLOOD PRESSURE: 154 MMHG | BODY MASS INDEX: 30.08 KG/M2

## 2023-06-14 DIAGNOSIS — R10.9 LEFT FLANK PAIN: Primary | ICD-10-CM

## 2023-06-14 DIAGNOSIS — K43.9 ABDOMINAL WALL HERNIA: ICD-10-CM

## 2023-06-14 DIAGNOSIS — I10 ESSENTIAL HYPERTENSION: ICD-10-CM

## 2023-06-14 DIAGNOSIS — K42.9 REDUCIBLE UMBILICAL HERNIA: ICD-10-CM

## 2023-06-14 PROBLEM — N20.0 NEPHROLITHIASIS: Status: ACTIVE | Noted: 2021-03-31

## 2023-06-14 LAB
ALBUMIN UR-MCNC: NEGATIVE MG/DL
APPEARANCE UR: CLEAR
BILIRUB UR QL STRIP: NEGATIVE
COLOR UR AUTO: YELLOW
GLUCOSE UR STRIP-MCNC: NEGATIVE MG/DL
HGB UR QL STRIP: NEGATIVE
KETONES UR STRIP-MCNC: NEGATIVE MG/DL
LEUKOCYTE ESTERASE UR QL STRIP: NEGATIVE
NITRATE UR QL: NEGATIVE
PH UR STRIP: 7 [PH] (ref 5–7)
SP GR UR STRIP: 1.02 (ref 1–1.03)
UROBILINOGEN UR STRIP-ACNC: 0.2 E.U./DL

## 2023-06-14 PROCEDURE — 76705 ECHO EXAM OF ABDOMEN: CPT

## 2023-06-14 PROCEDURE — 99214 OFFICE O/P EST MOD 30 MIN: CPT | Performed by: NURSE PRACTITIONER

## 2023-06-14 PROCEDURE — 81003 URINALYSIS AUTO W/O SCOPE: CPT | Performed by: NURSE PRACTITIONER

## 2023-06-14 NOTE — PROGRESS NOTES
Chief Complaint   Patient presents with     Hip Pain     Pain in the lower left tip of hip radiating to lower left back x 2-3 days          ICD-10-CM    1. Left flank pain  R10.9 UA Macroscopic with reflex to Microscopic and Culture      2. Essential hypertension  I10       3. Abdominal wall hernia  K43.9 US Hernia Evaluation      Patient is scheduled for an ultrasound later today to assess hernia.  He is instructed to make appointment to follow-up with primary care provider regarding this issue.  Advised him to avoid any heavy lifting and refer to the handouts given.    He is taking his amlodipine as scheduled and did take it this morning.  Blood pressure reading is high here which could be somewhat anxiety related.  Encouraged blood pressure checking at home to be sure it is less than 140/90.  If it remains elevated he should discuss with primary care provider.    No evidence of kidney stone, no evidence of fracture on exam, highly suspicious of a strained oblique muscle.  Suggested nonsteroidal anti-inflammatories, rest and ice as needed.  Alba Alcantar the nurse practitioner from the urgent care    Ultrasound for hernia evaluation 6/14/2023 preliminary report  IMPRESSION: Ventral midline abdominal hernia lying approximately 4 - 5 cm superior to the umbilicus. During Valsalva there is visualization of soft tissue motion through the hernia opening that is 1.9 cm in size. A CT could provide additional assessment.   This result has not been signed. Information might be incomplete.         Results for orders placed or performed in visit on 06/14/23 (from the past 24 hour(s))   UA Macroscopic with reflex to Microscopic and Culture    Specimen: Urine, Midstream   Result Value Ref Range    Color Urine Yellow Colorless, Straw, Light Yellow, Yellow    Appearance Urine Clear Clear    Glucose Urine Negative Negative mg/dL    Bilirubin Urine Negative Negative    Ketones Urine Negative Negative mg/dL    Specific Gravity Urine  1.020 1.003 - 1.035    Blood Urine Negative Negative    pH Urine 7.0 5.0 - 7.0    Protein Albumin Urine Negative Negative mg/dL    Urobilinogen Urine 0.2 0.2, 1.0 E.U./dL    Nitrite Urine Negative Negative    Leukocyte Esterase Urine Negative Negative    Narrative    Microscopic not indicated       Subjective     Jeffrey Conteh is an 68 year old male who presents to clinic today for left hip pain that radiates around to the back for several days.  Usually lasts about 10-15 seconds, comes and goes away quickly.    ROS: 10 point ROS neg other than the symptoms noted above in the HPI.       Objective    BP (!) 154/92   Pulse 70   Temp 97.6  F (36.4  C) (Tympanic)   Wt 103.4 kg (228 lb)   SpO2 97%   BMI 30.08 kg/m    Nurses notes and VS have been reviewed.    Physical Exam       GENERAL APPEARANCE: healthy appearing, alert     EYES: PERRL, EOMI, sclera non-icteric     HENT: oral exam benign, mucus membranes intact, without ulcers or lesions     NECK: no adenopathy or asymmetry, thyroid normal to palpation     RESP: lungs clear to auscultation - no rales, rhonchi or wheezes     CV: regular rates and rhythm, no murmurs, rubs, or gallop     ABDOMEN:  soft, nontender, no HSM or masses and bowel sounds normal, large area of bulging noted just above the umbilicus when patient is moving from lying to sitting position and vice versa     MS: extremities normal- no gross deformities noted; normal muscle tone.     SKIN: no suspicious lesions or rashes     NEURO: Normal strength and tone, mentation intact and speech normal     PSYCH: normal thought process; no significant mood disturbance      GREG Fierro, CNP  [facility] Provider    The use of Dragon/[employer] dictation services may have been used to construct the content in this note; any grammatical or spelling errors are non-intentional. Please contact the author of this note directly if you are in need of any clarification.

## 2023-06-14 NOTE — Clinical Note
Hi Dr Green, I saw your patient Jeffrey Conteh today in the urgent care for left flank pain.  His urinalysis was normal and no other causes seemed evident.  Seems highly suspicious for a strained muscle.  On exam I also noted him to have a large abdominal hernia.  He is not having pain with this and was unaware it was there.  I did order an ultrasound and the result is in.  I made patient aware of the results but asked that he see you in follow-up to determine next steps. If your office could contact patient to set up that appointment that would be most helpful.  GREG Fierro, CNP [facility] Provider

## 2023-06-14 NOTE — PATIENT INSTRUCTIONS
Take Tylenol or ibuprofen as needed for pain.  If symptoms persist over the next couple weeks consider following up with primary care provider.  If symptoms worsen or you develop fever or other concerns please return sooner.    Monitor blood pressure to be sure it is remaining less than 140/90.  If it remains elevated please discuss with primary care provider.    We have scheduled you for an outpatient ultrasound to evaluate possible hernia.  When this result returns you will be able to see it on my chart and should make follow-up appointment with primary care provider.

## 2023-06-14 NOTE — TELEPHONE ENCOUNTER
Reason for Call:  Appointment Request    Patient requesting this type of appt:  In Person    Requested provider: Doyle Silva    Reason patient unable to be scheduled: Not within requested timeframe    When does patient want to be seen/preferred time: 1-2 days    Comments: Hernia    Could we send this information to you in [facility] or would you prefer to receive a phone call?:   Patient would prefer a phone call   Okay to leave a detailed message?: Yes at Cell number on file:    Telephone Information:   Mobile [phone]       Call taken on 6/14/2023 at 1:49 PM by Lianne Larios

## 2023-06-15 ENCOUNTER — TELEPHONE (OUTPATIENT)
Dept: FAMILY MEDICINE | Facility: CLINIC | Age: 68
End: 2023-06-15
Payer: COMMERCIAL

## 2023-06-15 NOTE — TELEPHONE ENCOUNTER
Patient has appointment 6/22/2023 for ultra sound results.       Katty Mehta RN  [facility]

## 2023-06-15 NOTE — TELEPHONE ENCOUNTER
----- Message from Doyle Silva MD sent at 6/15/2023  8:02 AM CDT -----  Please have patient follow-up in the next 1 week to discuss his ultrasound report.  If any acute pain you should notify us and see us sooner.

## 2023-06-22 ENCOUNTER — OFFICE VISIT (OUTPATIENT)
Dept: FAMILY MEDICINE | Facility: CLINIC | Age: 68
End: 2023-06-22
Payer: COMMERCIAL

## 2023-06-22 VITALS
SYSTOLIC BLOOD PRESSURE: 136 MMHG | TEMPERATURE: 97.6 F | BODY MASS INDEX: 30.27 KG/M2 | RESPIRATION RATE: 16 BRPM | DIASTOLIC BLOOD PRESSURE: 86 MMHG | OXYGEN SATURATION: 96 % | HEART RATE: 81 BPM | WEIGHT: 229.4 LBS

## 2023-06-22 DIAGNOSIS — K43.9 ABDOMINAL WALL HERNIA: Primary | ICD-10-CM

## 2023-06-22 PROCEDURE — 99213 OFFICE O/P EST LOW 20 MIN: CPT | Performed by: FAMILY MEDICINE

## 2023-06-22 ASSESSMENT — PAIN SCALES - GENERAL: PAINLEVEL: MILD PAIN (3)

## 2023-06-22 NOTE — PROGRESS NOTES
Assessment & Plan     Abdominal wall hernia  Requested to be evaluated by general surgery.  Which is appropriate.  Does not have any acute abdomen.  Watchful waiting versus further evaluation discussed patient would like to proceed with evaluation.  - Adult General Surg Referral; Future  6}         Doyle Silva MD  [facility] [place] Murphy is a 68 year old, presenting for the following health issues:  Hernia and Ultrasound  Patient recently had an ultrasound of abdomen which indicate abdominal wall hernia.  He does not have any discomfort however he would like to get it checked.  Denies any pain in that area.  Initially had some discomfort in the back which has resolved.  No blood in the stool or urine problem.      6/22/2023     9:45 AM   Additional Questions   Roomed by Errol   Accompanied by N/A     History of Present Illness       Reason for visit:  Umbilical hernia    He eats 2-3 servings of fruits and vegetables daily.He consumes 1 sweetened beverage(s) daily.He exercises with enough effort to increase his heart rate 10 to 19 minutes per day.  He exercises with enough effort to increase his heart rate 3 or less days per week.   He is taking medications regularly.             Review of Systems   Constitutional, HEENT, cardiovascular, pulmonary, gi and gu systems are negative, except as otherwise noted.      Objective    /86   Pulse 81   Temp 97.6  F (36.4  C) (Tympanic)   Resp 16   Wt 104.1 kg (229 lb 6.4 oz)   SpO2 96%   BMI 30.27 kg/m    Body mass index is 30.27 kg/m .  Physical Exam   GENERAL: healthy, alert and no distress  NECK: no adenopathy, no asymmetry, masses, or scars and thyroid normal to palpation  RESP: lungs clear to auscultation - no rales, rhonchi or wheezes  CV: regular rate and rhythm, normal S1 S2, no S3 or S4, no murmur, click or rub, no peripheral edema and peripheral pulses strong  ABDOMEN: soft, nontender, no hepatosplenomegaly, no masses  and bowel sounds normal superior to the umbilical area there is small abdominal wall hernia which is not painful on palpation.  MS: no gross musculoskeletal defects noted, no edema

## 2023-07-13 ENCOUNTER — OFFICE VISIT (OUTPATIENT)
Dept: SURGERY | Facility: CLINIC | Age: 68
End: 2023-07-13
Payer: COMMERCIAL

## 2023-07-13 VITALS
SYSTOLIC BLOOD PRESSURE: 130 MMHG | HEART RATE: 80 BPM | HEIGHT: 73 IN | BODY MASS INDEX: 30.09 KG/M2 | WEIGHT: 227 LBS | DIASTOLIC BLOOD PRESSURE: 80 MMHG

## 2023-07-13 DIAGNOSIS — M62.08 DIASTASIS RECTI: Primary | ICD-10-CM

## 2023-07-13 PROCEDURE — 99204 OFFICE O/P NEW MOD 45 MIN: CPT | Performed by: SURGERY

## 2023-07-17 NOTE — PROGRESS NOTES
"[facility]  Surgery Consultation    CONSULTATION REQUESTED BY:  Ricardo Doyle [phone]    HPI: 68-year-old gentleman referred by the above provider for consultation regarding abdominal wall hernia.  He states that over the course the last several years he has had a 20 to 25 pound weight gain.  He has since noted a bulging in his upper abdomen.  The recently went to urgent care and was told that he had an abdominal wall hernia.  Urgent care visit was not for the bulging but due to back pain.  He has had no discomfort in his upper abdomen.  No signs or symptoms that suggest incarceration or strangulation.  No GI or bowel obstruction symptoms.  No difficulty with core exercise.    PMH:   has a past medical history of Arthritis and Hypertension.  PSH:    has a past surgical history that includes appendectomy; TOTAL HIP ARTHROPLASTY; Septoplasty; Combined Cystoscopy, Insert Stent Ureter(s) (Right, 10/15/2020); Extracorporeal shock wave lithotripsy (ESWL) (Right, 11/10/2020); Abdomen surgery (2006); colonoscopy; Soft tissue surgery (2004); and Colonoscopy (N/A, 6/2/2022).  Social History:   reports that he has never smoked. He has never used smokeless tobacco. He reports current alcohol use. He reports that he does not use drugs.  Family History:  family history includes Anxiety Disorder in his son; Cancer in his father, maternal grandmother, and maternal uncle; Dementia in his father; Depression in his mother; Hypertension in his father; Other Cancer in his mother; Ovarian Cancer in his mother; Parkinsonism in his father.  Medications/Allergies: Home medications and allergies reviewed.    ROS:  The 10 point Review of Systems is negative other than noted in the HPI.    Physical Exam:  /80   Pulse 80   Ht 1.854 m (6' 1\")   Wt 103 kg (227 lb)   BMI 29.95 kg/m    GENERAL: Generally appears well.  Psych: Alert and Oriented.  Normal affect  Eyes: Sclera clear  Respiratory:  Lungs clear to " ausculation bilaterally with good air excursion  Cardiovascular:  Regular Rate and Rhythm with no murmurs gallops or rubs, normal peripheral pulses  Abdomen: Soft, nondistended, initiation of sit up reveals modest diastases recti.  No true palpable hernia defect.  Nothing to correlate with the results of his ultrasound.  Lymphatic/Hematologic/Immune:  No femoral or cervical lymphadenopathy.  Integumentary:  No rashes  Neurological: grossly intact     All new lab and imaging data was reviewed.  I specifically reviewed the results of his recent ultrasound.  Unsure as to what they were seeing as I can feel no significant or identifiable fascial defect    Impression and Plan:  Patient is a 68 year old male with diastases recti.    PLAN: Pathophysiology of diastases was discussed.  I see no evidence of true hernia defect.  At this point no indication to proceed with abdominal wall reconstruction or hernia repair.  This was all discussed with him in detail.  Certainly is possible that at the time of my examination some manner of true hernia was not noted but at present my examination only reveals diastases.      Thank you very much for this consult.    Juan Helm M.D.  [facility]  [phone]    Please route or send letter to:  Primary Care Provider (PCP) and Referring Provider

## 2023-08-19 ENCOUNTER — HEALTH MAINTENANCE LETTER (OUTPATIENT)
Age: 68
End: 2023-08-19

## 2023-09-06 DIAGNOSIS — I10 ESSENTIAL HYPERTENSION: ICD-10-CM

## 2023-09-06 RX ORDER — AMLODIPINE BESYLATE 5 MG/1
TABLET ORAL
Qty: 90 TABLET | Refills: 0 | Status: SHIPPED | OUTPATIENT
Start: 2023-09-06 | End: 2023-10-17

## 2023-09-06 NOTE — TELEPHONE ENCOUNTER
Spoke with patient, scheduled on 10/17 for medication follow up.    Caitlin NORIEGA   [facility]    Yes

## 2023-10-17 ENCOUNTER — OFFICE VISIT (OUTPATIENT)
Dept: FAMILY MEDICINE | Facility: CLINIC | Age: 68
End: 2023-10-17
Payer: COMMERCIAL

## 2023-10-17 VITALS
RESPIRATION RATE: 11 BRPM | WEIGHT: 217.6 LBS | BODY MASS INDEX: 28.84 KG/M2 | HEART RATE: 67 BPM | SYSTOLIC BLOOD PRESSURE: 138 MMHG | HEIGHT: 73 IN | TEMPERATURE: 98.1 F | DIASTOLIC BLOOD PRESSURE: 80 MMHG | OXYGEN SATURATION: 99 %

## 2023-10-17 DIAGNOSIS — Z12.5 SCREENING FOR PROSTATE CANCER: ICD-10-CM

## 2023-10-17 DIAGNOSIS — I10 ESSENTIAL HYPERTENSION: Primary | ICD-10-CM

## 2023-10-17 DIAGNOSIS — Z00.00 ENCOUNTER FOR ANNUAL PHYSICAL EXAM: ICD-10-CM

## 2023-10-17 DIAGNOSIS — Z13.220 LIPID SCREENING: ICD-10-CM

## 2023-10-17 LAB
ALBUMIN SERPL BCG-MCNC: 4.3 G/DL (ref 3.5–5.2)
ALP SERPL-CCNC: 82 U/L (ref 40–129)
ALT SERPL W P-5'-P-CCNC: 13 U/L (ref 0–70)
ANION GAP SERPL CALCULATED.3IONS-SCNC: 13 MMOL/L (ref 7–15)
AST SERPL W P-5'-P-CCNC: 22 U/L (ref 0–45)
BILIRUB SERPL-MCNC: 0.6 MG/DL
BUN SERPL-MCNC: 13 MG/DL (ref 8–23)
CALCIUM SERPL-MCNC: 9.2 MG/DL (ref 8.8–10.2)
CHLORIDE SERPL-SCNC: 103 MMOL/L (ref 98–107)
CHOLEST SERPL-MCNC: 179 MG/DL
CREAT SERPL-MCNC: 1.08 MG/DL (ref 0.67–1.17)
DEPRECATED HCO3 PLAS-SCNC: 26 MMOL/L (ref 22–29)
EGFRCR SERPLBLD CKD-EPI 2021: 76 ML/MIN/1.73M2
ERYTHROCYTE [DISTWIDTH] IN BLOOD BY AUTOMATED COUNT: 12.6 % (ref 10–15)
GLUCOSE SERPL-MCNC: 91 MG/DL (ref 70–99)
HCT VFR BLD AUTO: 46 % (ref 40–53)
HDLC SERPL-MCNC: 50 MG/DL
HGB BLD-MCNC: 15.7 G/DL (ref 13.3–17.7)
LDLC SERPL CALC-MCNC: 104 MG/DL
MCH RBC QN AUTO: 29.6 PG (ref 26.5–33)
MCHC RBC AUTO-ENTMCNC: 34.1 G/DL (ref 31.5–36.5)
MCV RBC AUTO: 87 FL (ref 78–100)
NONHDLC SERPL-MCNC: 129 MG/DL
PLATELET # BLD AUTO: 280 10E3/UL (ref 150–450)
POTASSIUM SERPL-SCNC: 3.9 MMOL/L (ref 3.4–5.3)
PROT SERPL-MCNC: 6.6 G/DL (ref 6.4–8.3)
PSA SERPL DL<=0.01 NG/ML-MCNC: 0.47 NG/ML (ref 0–4.5)
RBC # BLD AUTO: 5.31 10E6/UL (ref 4.4–5.9)
SODIUM SERPL-SCNC: 142 MMOL/L (ref 135–145)
TRIGL SERPL-MCNC: 125 MG/DL
WBC # BLD AUTO: 7.1 10E3/UL (ref 4–11)

## 2023-10-17 PROCEDURE — 85027 COMPLETE CBC AUTOMATED: CPT | Performed by: FAMILY MEDICINE

## 2023-10-17 PROCEDURE — G0103 PSA SCREENING: HCPCS | Performed by: FAMILY MEDICINE

## 2023-10-17 PROCEDURE — 90480 ADMN SARSCOV2 VAC 1/ONLY CMP: CPT | Performed by: FAMILY MEDICINE

## 2023-10-17 PROCEDURE — 80053 COMPREHEN METABOLIC PANEL: CPT | Performed by: FAMILY MEDICINE

## 2023-10-17 PROCEDURE — 91320 SARSCV2 VAC 30MCG TRS-SUC IM: CPT | Performed by: FAMILY MEDICINE

## 2023-10-17 PROCEDURE — 90662 IIV NO PRSV INCREASED AG IM: CPT | Performed by: FAMILY MEDICINE

## 2023-10-17 PROCEDURE — 99397 PER PM REEVAL EST PAT 65+ YR: CPT | Mod: 25 | Performed by: FAMILY MEDICINE

## 2023-10-17 PROCEDURE — 90471 IMMUNIZATION ADMIN: CPT | Performed by: FAMILY MEDICINE

## 2023-10-17 PROCEDURE — 36415 COLL VENOUS BLD VENIPUNCTURE: CPT | Performed by: FAMILY MEDICINE

## 2023-10-17 PROCEDURE — 99213 OFFICE O/P EST LOW 20 MIN: CPT | Mod: 25 | Performed by: FAMILY MEDICINE

## 2023-10-17 PROCEDURE — 80061 LIPID PANEL: CPT | Performed by: FAMILY MEDICINE

## 2023-10-17 RX ORDER — AMLODIPINE BESYLATE 5 MG/1
TABLET ORAL
Qty: 90 TABLET | Refills: 3 | Status: SHIPPED | OUTPATIENT
Start: 2023-10-17

## 2023-10-17 ASSESSMENT — ENCOUNTER SYMPTOMS
ABDOMINAL PAIN: 0
PALPITATIONS: 0
DYSURIA: 0
HEMATURIA: 0
NAUSEA: 0
DIZZINESS: 0
CONSTIPATION: 1
SHORTNESS OF BREATH: 0
HEADACHES: 0
COUGH: 0
PARESTHESIAS: 0
JOINT SWELLING: 0
HEARTBURN: 0
HEMATOCHEZIA: 0
FEVER: 0
EYE PAIN: 0
MYALGIAS: 0
ARTHRALGIAS: 1
CHILLS: 0
DIARRHEA: 0
WEAKNESS: 0
NERVOUS/ANXIOUS: 0
FREQUENCY: 0
SORE THROAT: 0

## 2023-10-17 ASSESSMENT — ACTIVITIES OF DAILY LIVING (ADL): CURRENT_FUNCTION: NO ASSISTANCE NEEDED

## 2023-10-17 ASSESSMENT — PAIN SCALES - GENERAL: PAINLEVEL: NO PAIN (0)

## 2023-10-17 NOTE — LETTER
October 26, 2023      Jeffrey Conteh  [address]   [place] [address]        Dear ,      I have reviewed your recent labs. Here are the results:     -Normal red blood cell (hgb) levels, normal white blood cell count and normal platelet levels.   -PSA (prostate specific antigen) test is normal.  This indicates a low likelihood of prostate cancer.  ADVISE: rechecking this in 1 year.   -Cholesterol levels (LDL,HDL, Triglycerides) are normal.  ADVISE: rechecking in 1 year.   -Liver and gallbladder tests are normal (ALT,AST, Alk phos, bilirubin), kidney function is normal (Cr, GFR), sodium is normal, potassium is normal, calcium is normal, glucose is normal.       Resulted Orders   Lipid panel reflex to direct LDL Fasting   Result Value Ref Range    Cholesterol 179 <200 mg/dL    Triglycerides 125 <150 mg/dL    Direct Measure HDL 50 >=40 mg/dL    LDL Cholesterol Calculated 104 (H) <=100 mg/dL    Non HDL Cholesterol 129 <130 mg/dL    Narrative    Cholesterol  Desirable:  <200 mg/dL    Triglycerides  Normal:  Less than 150 mg/dL  Borderline High:  150-199 mg/dL  High:  200-499 mg/dL  Very High:  Greater than or equal to 500 mg/dL    Direct Measure HDL  Female:  Greater than or equal to 50 mg/dL   Male:  Greater than or equal to 40 mg/dL    LDL Cholesterol  Desirable:  <100mg/dL  Above Desirable:  100-129 mg/dL   Borderline High:  130-159 mg/dL   High:  160-189 mg/dL   Very High:  >= 190 mg/dL    Non HDL Cholesterol  Desirable:  130 mg/dL  Above Desirable:  130-159 mg/dL  Borderline High:  160-189 mg/dL  High:  190-219 mg/dL  Very High:  Greater than or equal to 220 mg/dL   Prostate Specific Antigen Screen   Result Value Ref Range    Prostate Specific Antigen Screen 0.47 0.00 - 4.50 ng/mL    Narrative    This result is obtained using the Roche Elecsys total PSA method on the rosalie [id number] immunoassay analyzer. Results obtained with different assay methods or kits cannot be used interchangeably.    Comprehensive metabolic panel   Result Value Ref Range    Sodium 142 135 - 145 mmol/L      Comment:      Reference intervals for this test were updated on 09/26/2023 to more accurately reflect our healthy population. There may be differences in the flagging of prior results with similar values performed with this method. Interpretation of those prior results can be made in the context of the updated reference intervals.     Potassium 3.9 3.4 - 5.3 mmol/L    Carbon Dioxide (CO2) 26 22 - 29 mmol/L    Anion Gap 13 7 - 15 mmol/L    Urea Nitrogen 13.0 8.0 - 23.0 mg/dL    Creatinine 1.08 0.67 - 1.17 mg/dL    GFR Estimate 76 >60 mL/min/1.73m2    Calcium 9.2 8.8 - 10.2 mg/dL    Chloride 103 98 - 107 mmol/L    Glucose 91 70 - 99 mg/dL    Alkaline Phosphatase 82 40 - 129 U/L    AST 22 0 - 45 U/L      Comment:      Reference intervals for this test were updated on 6/12/2023 to more accurately reflect our healthy population. There may be differences in the flagging of prior results with similar values performed with this method. Interpretation of those prior results can be made in the context of the updated reference intervals.    ALT 13 0 - 70 U/L      Comment:      Reference intervals for this test were updated on 6/12/2023 to more accurately reflect our healthy population. There may be differences in the flagging of prior results with similar values performed with this method. Interpretation of those prior results can be made in the context of the updated reference intervals.      Protein Total 6.6 6.4 - 8.3 g/dL    Albumin 4.3 3.5 - 5.2 g/dL    Bilirubin Total 0.6 <=1.2 mg/dL   CBC with Platelets   Result Value Ref Range    WBC Count 7.1 4.0 - 11.0 10e3/uL    RBC Count 5.31 4.40 - 5.90 10e6/uL    Hemoglobin 15.7 13.3 - 17.7 g/dL    Hematocrit 46.0 40.0 - 53.0 %    MCV 87 78 - 100 fL    MCH 29.6 26.5 - 33.0 pg    MCHC 34.1 31.5 - 36.5 g/dL    RDW 12.6 10.0 - 15.0 %    Platelet Count 280 150 - 450 10e3/uL       If you have  any questions or concerns, please call the clinic at the number listed above.       Sincerely,      Doyle Silva MD

## 2023-10-17 NOTE — PROGRESS NOTES
"SUBJECTIVE:   Jeffrey is a 68 year old who presents for Preventive Visit.      10/17/2023    10:57 AM   Additional Questions   Roomed by patrick       Are you in the first 12 months of your Medicare coverage?  No    Imm/Inj  Associated symptoms include arthralgias. Pertinent negatives include no abdominal pain, chest pain, chills, congestion, coughing, fever, headaches, joint swelling, myalgias, nausea, rash, sore throat or weakness.   Healthy Habits:     In general, how would you rate your overall health?  Good    Frequency of exercise:  2-3 days/week    Duration of exercise:  Less than 15 minutes    Do you usually eat at least 4 servings of fruit and vegetables a day, include whole grains    & fiber and avoid regularly eating high fat or \"junk\" foods?  Yes    Taking medications regularly:  Yes    Medication side effects:  None    Ability to successfully perform activities of daily living:  No assistance needed    Home Safety:  No safety concerns identified    Hearing Impairment:  Difficulty following a conversation in a noisy restaurant or crowded room and difficulty understanding soft or whispered speech    In the past 6 months, have you been bothered by leaking of urine? Yes    In general, how would you rate your overall mental or emotional health?  Excellent    Additional concerns today:  No          Have you ever done Advance Care Planning? (For example, a Health Directive, POLST, or a discussion with a medical provider or your loved ones about your wishes): No, advance care planning information given to patient to review.  Patient plans to discuss their wishes with loved ones or provider.        Fall risk  Fallen 2 or more times in the past year?: No  Any fall with injury in the past year?: No    Cognitive Screening   1) Repeat 3 items (Leader, Season, Table)   2) Clock draw: NORMAL  3) 3 item recall: Recalls 2 objects   Results: 3 items recalled: COGNITIVE IMPAIRMENT LESS LIKELY    Mini-CogTM Copyright S Kenny. " Licensed by the author for use in [facility]; reprinted with permission (rafy@[email or web address]). All rights reserved.      Do you have sleep apnea, excessive snoring or daytime drowsiness? : yes    Reviewed and updated as needed this visit by clinical staff   Tobacco  Allergies  Meds              Reviewed and updated as needed this visit by Provider                 Social History     Tobacco Use    Smoking status: Never    Smokeless tobacco: Never   Substance Use Topics    Alcohol use: Yes     Comment: less than weekly            10/10/2023     9:19 AM   Alcohol Use   Prescreen: >3 drinks/day or >7 drinks/week? No     Do you have a current opioid prescription? No  Do you use any other controlled substances or medications that are not prescribed by a provider? None      Current providers sharing in care for this patient include: Patient Care Team:  Doyle Silva MD as PCP - General (Family Practice)  Doyle Silva MD as Assigned PCP  Juan Helm MD as Assigned Surgical Provider    The following health maintenance items are reviewed in Epic and correct as of today:  Health Maintenance   Topic Date Due    RSV VACCINE 60+ (1 - 1-dose 60+ series) Never done    Pneumococcal Vaccine: 65+ Years (1 - PCV) Never done    MEDICARE ANNUAL WELLNESS VISIT  05/24/2023    INFLUENZA VACCINE (1) 09/01/2023    COVID-19 Vaccine (7 - 2023-24 season) 09/01/2023    ANNUAL REVIEW OF HM ORDERS  10/14/2023    FALL RISK ASSESSMENT  10/17/2024    LIPID  05/24/2027    ADVANCE CARE PLANNING  05/24/2027    COLORECTAL CANCER SCREENING  06/02/2027    DTAP/TDAP/TD IMMUNIZATION (3 - Td or Tdap) 09/19/2029    HEPATITIS C SCREENING  Completed    PHQ-2 (once per calendar year)  Completed    ZOSTER IMMUNIZATION  Completed    AORTIC ANEURYSM SCREENING (SYSTEM ASSIGNED)  Completed    IPV IMMUNIZATION  Aged Out    HPV IMMUNIZATION  Aged Out    MENINGITIS IMMUNIZATION  Aged Out             Review of Systems   Constitutional:  Negative for  "chills and fever.   HENT:  Negative for congestion, ear pain, hearing loss and sore throat.    Eyes:  Negative for pain and visual disturbance.   Respiratory:  Negative for cough and shortness of breath.    Cardiovascular:  Negative for chest pain, palpitations and peripheral edema.   Gastrointestinal:  Positive for constipation. Negative for abdominal pain, diarrhea, heartburn, hematochezia and nausea.   Genitourinary:  Positive for urgency. Negative for dysuria, frequency, genital sores, hematuria, impotence and penile discharge.   Musculoskeletal:  Positive for arthralgias. Negative for joint swelling and myalgias.   Skin:  Negative for rash.   Neurological:  Negative for dizziness, weakness, headaches and paresthesias.   Psychiatric/Behavioral:  Negative for mood changes. The patient is not nervous/anxious.          OBJECTIVE:   /80   Pulse 67   Temp 98.1  F (36.7  C) (Tympanic)   Resp 11   Ht 1.847 m (6' 0.7\")   Wt 98.7 kg (217 lb 9.6 oz)   SpO2 99%   BMI 28.95 kg/m   Estimated body mass index is 28.95 kg/m  as calculated from the following:    Height as of this encounter: 1.847 m (6' 0.7\").    Weight as of this encounter: 98.7 kg (217 lb 9.6 oz).  Physical Exam  GENERAL: healthy, alert and no distress  EYES: Eyes grossly normal to inspection, PERRL and conjunctivae and sclerae normal  HENT: ear canals and TM's normal, nose and mouth without ulcers or lesions  NECK: no adenopathy, no asymmetry, masses, or scars and thyroid normal to palpation  RESP: lungs clear to auscultation - no rales, rhonchi or wheezes  CV: regular rate and rhythm, normal S1 S2, no S3 or S4, no murmur, click or rub, no peripheral edema and peripheral pulses strong  ABDOMEN: soft, nontender, no hepatosplenomegaly, no masses and bowel sounds normal  MS: no gross musculoskeletal defects noted, no edema  SKIN: no suspicious lesions or rashes  NEURO: Normal strength and tone, mentation intact and speech normal  PSYCH: mentation " "appears normal, affect normal/bright    Diagnostic Test Results:  Labs reviewed in Epic    ASSESSMENT / PLAN:   Jeffrey was seen today for wellness visit and imm/inj.    Diagnoses and all orders for this visit:    Essential hypertension  -     Comprehensive metabolic panel; Future  -     amLODIPine (NORVASC) 5 MG tablet; TAKE 1 TABLET(5 MG) BY MOUTH DAILY  -     Comprehensive metabolic panel    Encounter for annual physical exam  -     Lipid panel reflex to direct LDL Fasting; Future  -     Prostate Specific Antigen Screen; Future  -     Comprehensive metabolic panel; Future  -     CBC with Platelets; Future  -     Lipid panel reflex to direct LDL Fasting  -     Prostate Specific Antigen Screen  -     Comprehensive metabolic panel  -     CBC with Platelets    Screening for prostate cancer  -     Prostate Specific Antigen Screen; Future  -     Prostate Specific Antigen Screen    Lipid screening  -     Lipid panel reflex to direct LDL Fasting; Future  -     Comprehensive metabolic panel; Future  -     Lipid panel reflex to direct LDL Fasting  -     Comprehensive metabolic panel    Other orders  -     INFLUENZA VACCINE 65+ (FLUZONE HD)  -     COVID-19 12+ (2023-24) (PFIZER)  -     REVIEW OF HEALTH MAINTENANCE PROTOCOL ORDERS  -     PRIMARY CARE FOLLOW-UP SCHEDULING; Future        Patient has been advised of split billing requirements and indicates understanding: Yes      COUNSELING:  Reviewed preventive health counseling, as reflected in patient instructions       Regular exercise       Healthy diet/nutrition      BMI:   Estimated body mass index is 28.95 kg/m  as calculated from the following:    Height as of this encounter: 1.847 m (6' 0.7\").    Weight as of this encounter: 98.7 kg (217 lb 9.6 oz).         He reports that he has never smoked. He has never used smokeless tobacco.      Appropriate preventive services were discussed with this patient, including applicable screening as appropriate for fall prevention, " nutrition, physical activity, Tobacco-use cessation, weight loss and cognition.  Checklist reviewing preventive services available has been given to the patient.    Reviewed patients plan of care and provided an AVS. The Basic Care Plan (routine screening as documented in Health Maintenance) for Jeffrey meets the Care Plan requirement. This Care Plan has been established and reviewed with the Patient.        Doyle Silva MD  [facility]    Identified Health Risks:  I have reviewed Opioid Use Disorder and Substance Use Disorder risk factors and made any needed referrals.

## 2023-10-17 NOTE — PATIENT INSTRUCTIONS
Patient Education   Personalized Prevention Plan  You are due for the preventive services outlined below.  Your care team is available to assist you in scheduling these services.  If you have already completed any of these items, please share that information with your care team to update in your medical record.  Health Maintenance Due   Topic Date Due     RSV VACCINE 60+ (1 - 1-dose 60+ series) Never done     Pneumococcal Vaccine (1 - PCV) Never done     Annual Wellness Visit  05/24/2023     Flu Vaccine (1) 09/01/2023     COVID-19 Vaccine (7 - 2023-24 season) 09/01/2023     Hearing Loss: Care Instructions  Overview     Hearing loss is a sudden or slow decrease in how well you hear. It can range from slight to profound. Permanent hearing loss can occur with aging. It also can happen when you are exposed long-term to loud noise. Examples include listening to loud music, riding motorcycles, or being around other loud machines.  Hearing loss can affect your work and home life. It can make you feel lonely or depressed. You may feel that you have lost your independence. But hearing aids and other devices can help you hear better and feel connected to others.  Follow-up care is a key part of your treatment and safety. Be sure to make and go to all appointments, and call your doctor if you are having problems. It's also a good idea to know your test results and keep a list of the medicines you take.  How can you care for yourself at home?  Avoid loud noises whenever possible. This helps keep your hearing from getting worse.  Always wear hearing protection around loud noises.  Wear a hearing aid as directed.  A professional can help you pick a hearing aid that will work best for you.  You can also get hearing aids over the counter for mild to moderate hearing loss.  Have hearing tests as your doctor suggests. They can show whether your hearing has changed. Your hearing aid may need to be adjusted.  Use other devices as  "needed. These may include:  Telephone amplifiers and hearing aids that can connect to a television, stereo, radio, or microphone.  Devices that use lights or vibrations. These alert you to the doorbell, a ringing telephone, or a baby monitor.  Television closed-captioning. This shows the words at the bottom of the screen. Most new TVs can do this.  TTY (text telephone). This lets you type messages back and forth on the telephone instead of talking or listening. These devices are also called TDD. When messages are typed on the keyboard, they are sent over the phone line to a receiving TTY. The message is shown on a monitor.  Use text messaging, social media, and email if it is hard for you to communicate by telephone.  Try to learn a listening technique called speechreading. It is not lipreading. You pay attention to people's gestures, expressions, posture, and tone of voice. These clues can help you understand what a person is saying. Face the person you are talking to, and have them face you. Make sure the lighting is good. You need to see the other person's face clearly.  Think about counseling if you need help to adjust to your hearing loss.  When should you call for help?  Watch closely for changes in your health, and be sure to contact your doctor if:    You think your hearing is getting worse.     You have new symptoms, such as dizziness or nausea.   Where can you learn more?  Go to https://www.[employer].net/patiented  Enter R798 in the search box to learn more about \"Hearing Loss: Care Instructions.\"  Current as of: March 1, 2023               Content Version: 13.7 2006-2023 [employer].   Care instructions adapted under license by your healthcare professional. If you have questions about a medical condition or this instruction, always ask your healthcare professional. [employer] disclaims any warranty or liability for your use of this information.      Bladder Training: Care " Instructions  Your Care Instructions     Bladder training is used to treat urge incontinence and stress incontinence. Urge incontinence means that the need to urinate comes on so fast that you can't get to a toilet in time. Stress incontinence means that you leak urine because of pressure on your bladder. For example, it may happen when you laugh, cough, or lift something heavy.  Bladder training can increase how long you can wait before you have to urinate. It can also help your bladder hold more urine. And it can give you better control over the urge to urinate.  It is important to remember that bladder training takes a few weeks to a few months to make a difference. You may not see results right away, but don't give up.  Follow-up care is a key part of your treatment and safety. Be sure to make and go to all appointments, and call your doctor if you are having problems. It's also a good idea to know your test results and keep a list of the medicines you take.  How can you care for yourself at home?  Work with your doctor to come up with a bladder training program that is right for you. You may use one or more of the following methods.  Delayed urination  In the beginning, try to keep from urinating for 5 minutes after you first feel the need to go.  While you wait, take deep, slow breaths to relax. Kegel exercises can also help you delay the need to go to the bathroom.  After some practice, when you can easily wait 5 minutes to urinate, try to wait 10 minutes before you urinate.  Slowly increase the waiting period until you are able to control when you have to urinate.  Scheduled urination  Empty your bladder when you first wake up in the morning.  Schedule times throughout the day when you will urinate.  Start by going to the bathroom every hour, even if you don't need to go.  Slowly increase the time between trips to the bathroom.  When you have found a schedule that works well for you, keep doing it.  If you  "wake up during the night and have to urinate, do it. Apply your schedule to waking hours only.  Kegel exercises  These tighten and strengthen pelvic muscles, which can help you control the flow of urine. (If doing these exercises causes pain, stop doing them and talk with your doctor.) To do Kegel exercises:  Squeeze your muscles as if you were trying not to pass gas. Or squeeze your muscles as if you were stopping the flow of urine. Your belly, legs, and buttocks shouldn't move.  Hold the squeeze for 3 seconds, then relax for 5 to 10 seconds.  Start with 3 seconds, then add 1 second each week until you are able to squeeze for 10 seconds.  Repeat the exercise 10 times a session. Do 3 to 8 sessions a day.  When should you call for help?  Watch closely for changes in your health, and be sure to contact your doctor if:    Your incontinence is getting worse.     You do not get better as expected.   Where can you learn more?  Go to https://www.[employer].net/patiented  Enter V684 in the search box to learn more about \"Bladder Training: Care Instructions.\"  Current as of: March 1, 2023               Content Version: 13.7    2314-1499 [employer].   Care instructions adapted under license by your healthcare professional. If you have questions about a medical condition or this instruction, always ask your healthcare professional. [employer] disclaims any warranty or liability for your use of this information.         "

## 2023-11-07 ENCOUNTER — ALLIED HEALTH/NURSE VISIT (OUTPATIENT)
Dept: FAMILY MEDICINE | Facility: CLINIC | Age: 68
End: 2023-11-07
Payer: COMMERCIAL

## 2023-11-07 DIAGNOSIS — Z23 ENCOUNTER FOR IMMUNIZATION: Primary | ICD-10-CM

## 2023-11-07 PROCEDURE — 90677 PCV20 VACCINE IM: CPT

## 2023-11-07 PROCEDURE — 99207 PR NO CHARGE NURSE ONLY: CPT

## 2023-11-07 PROCEDURE — 90471 IMMUNIZATION ADMIN: CPT

## 2023-11-07 NOTE — PROGRESS NOTES
Prior to immunization administration, verified patients identity using patient s name and date of birth. Please see Immunization Activity for additional information.     Screening Questionnaire for Adult Immunization    Are you sick today?   No   Do you have allergies to medications, food, a vaccine component or latex?   No   Have you ever had a serious reaction after receiving a vaccination?   No   Do you have a long-term health problem with heart, lung, kidney, or metabolic disease (e.g., diabetes), asthma, a blood disorder, no spleen, complement component deficiency, a cochlear implant, or a spinal fluid leak?  Are you on long-term aspirin therapy?   No   Do you have cancer, leukemia, HIV/AIDS, or any other immune system problem?   No   Do you have a parent, brother, or sister with an immune system problem?   No   In the past 3 months, have you taken medications that affect  your immune system, such as prednisone, other steroids, or anticancer drugs; drugs for the treatment of rheumatoid arthritis, Crohn s disease, or psoriasis; or have you had radiation treatments?   No   Have you had a seizure, or a brain or other nervous system problem?   No   During the past year, have you received a transfusion of blood or blood    products, or been given immune (gamma) globulin or antiviral drug?   No   For women: Are you pregnant or is there a chance you could become       pregnant during the next month?   No   Have you received any vaccinations in the past 4 weeks?   No     Immunization questionnaire answers were all negative.    I have reviewed the following standing orders: This patient is due and qualifies for the Pneumococcal vaccine.    Click here for Pneumococcal (Adult) Standing Order    I have reviewed the vaccines inclusion and exclusion criteria;No concerns regarding eligibility.     Patient instructed to remain in clinic for 15 minutes afterwards, and to report any adverse reactions.     Screening performed by  Rachelle Hernandez, CMA on 11/7/2023 at 10:25 AM.

## 2024-01-16 ENCOUNTER — TRANSFERRED RECORDS (OUTPATIENT)
Dept: HEALTH INFORMATION MANAGEMENT | Facility: CLINIC | Age: 69
End: 2024-01-16
Payer: COMMERCIAL

## 2024-02-21 ENCOUNTER — TRANSFERRED RECORDS (OUTPATIENT)
Dept: HEALTH INFORMATION MANAGEMENT | Facility: CLINIC | Age: 69
End: 2024-02-21

## 2024-05-23 ENCOUNTER — TELEPHONE (OUTPATIENT)
Dept: FAMILY MEDICINE | Facility: CLINIC | Age: 69
End: 2024-05-23
Payer: COMMERCIAL

## 2024-05-23 NOTE — TELEPHONE ENCOUNTER
Reason for Call:  Appointment Request    Patient requesting this type of appt:  Preventive     Requested provider: Doyle Silva    Reason patient unable to be scheduled: Not within requested timeframe    When does patient want to be seen/preferred time:  wants an appointment for the  last week of June (27-31) 2024    Comments: patient wants an appointment for the last week of June for his annual check because patient plans to retire in June of 2025, and would like to try to fit another annual check up before his insurance ends when he retires in June of 2025.    Could we send this information to you in [employer] or would you prefer to receive a phone call?:   No preference   Okay to leave a detailed message?: Yes at Home number on file [phone] (home)    Call taken on 5/23/2024 at 2:52 PM by Asia Guthrie

## 2024-05-24 NOTE — TELEPHONE ENCOUNTER
Called pt, last annual exam 10/17/23.  Scheduled next appt for after that date.Lam RODRIGUEZ CMA

## 2024-08-28 ENCOUNTER — TRANSFERRED RECORDS (OUTPATIENT)
Dept: HEALTH INFORMATION MANAGEMENT | Facility: CLINIC | Age: 69
End: 2024-08-28
Payer: COMMERCIAL

## 2024-10-10 ENCOUNTER — IMMUNIZATION (OUTPATIENT)
Dept: FAMILY MEDICINE | Facility: CLINIC | Age: 69
End: 2024-10-10
Payer: COMMERCIAL

## 2024-10-10 DIAGNOSIS — Z23 HIGH PRIORITY FOR 2019-NCOV VACCINE: ICD-10-CM

## 2024-10-10 DIAGNOSIS — Z23 NEED FOR PROPHYLACTIC VACCINATION AND INOCULATION AGAINST INFLUENZA: Primary | ICD-10-CM

## 2024-10-10 PROCEDURE — 90662 IIV NO PRSV INCREASED AG IM: CPT

## 2024-10-10 PROCEDURE — 90480 ADMN SARSCOV2 VAC 1/ONLY CMP: CPT

## 2024-10-10 PROCEDURE — 90471 IMMUNIZATION ADMIN: CPT

## 2024-10-10 PROCEDURE — 91320 SARSCV2 VAC 30MCG TRS-SUC IM: CPT

## 2024-10-17 SDOH — HEALTH STABILITY: PHYSICAL HEALTH: ON AVERAGE, HOW MANY MINUTES DO YOU ENGAGE IN EXERCISE AT THIS LEVEL?: 30 MIN

## 2024-10-17 SDOH — HEALTH STABILITY: PHYSICAL HEALTH: ON AVERAGE, HOW MANY DAYS PER WEEK DO YOU ENGAGE IN MODERATE TO STRENUOUS EXERCISE (LIKE A BRISK WALK)?: 4 DAYS

## 2024-10-17 ASSESSMENT — SOCIAL DETERMINANTS OF HEALTH (SDOH): HOW OFTEN DO YOU GET TOGETHER WITH FRIENDS OR RELATIVES?: TWICE A WEEK

## 2024-10-22 ENCOUNTER — OFFICE VISIT (OUTPATIENT)
Dept: FAMILY MEDICINE | Facility: CLINIC | Age: 69
End: 2024-10-22
Payer: COMMERCIAL

## 2024-10-22 VITALS
BODY MASS INDEX: 29.58 KG/M2 | SYSTOLIC BLOOD PRESSURE: 150 MMHG | HEART RATE: 72 BPM | TEMPERATURE: 97.9 F | WEIGHT: 223.2 LBS | DIASTOLIC BLOOD PRESSURE: 90 MMHG | OXYGEN SATURATION: 98 % | HEIGHT: 73 IN | RESPIRATION RATE: 12 BRPM

## 2024-10-22 DIAGNOSIS — Z12.5 SCREENING FOR PROSTATE CANCER: ICD-10-CM

## 2024-10-22 DIAGNOSIS — Z00.00 ENCOUNTER FOR ANNUAL PHYSICAL EXAM: ICD-10-CM

## 2024-10-22 DIAGNOSIS — I10 ESSENTIAL HYPERTENSION: Primary | ICD-10-CM

## 2024-10-22 DIAGNOSIS — E78.5 HYPERLIPIDEMIA LDL GOAL <130: ICD-10-CM

## 2024-10-22 LAB
ALBUMIN SERPL BCG-MCNC: 4.1 G/DL (ref 3.5–5.2)
ALP SERPL-CCNC: 95 U/L (ref 40–150)
ALT SERPL W P-5'-P-CCNC: 11 U/L (ref 0–70)
ANION GAP SERPL CALCULATED.3IONS-SCNC: 11 MMOL/L (ref 7–15)
AST SERPL W P-5'-P-CCNC: 24 U/L (ref 0–45)
BILIRUB SERPL-MCNC: 0.7 MG/DL
BUN SERPL-MCNC: 10.4 MG/DL (ref 8–23)
CALCIUM SERPL-MCNC: 9.1 MG/DL (ref 8.8–10.4)
CHLORIDE SERPL-SCNC: 106 MMOL/L (ref 98–107)
CHOLEST SERPL-MCNC: 198 MG/DL
CREAT SERPL-MCNC: 1.02 MG/DL (ref 0.67–1.17)
EGFRCR SERPLBLD CKD-EPI 2021: 81 ML/MIN/1.73M2
FASTING STATUS PATIENT QL REPORTED: YES
FASTING STATUS PATIENT QL REPORTED: YES
GLUCOSE SERPL-MCNC: 99 MG/DL (ref 70–99)
HCO3 SERPL-SCNC: 24 MMOL/L (ref 22–29)
HDLC SERPL-MCNC: 51 MG/DL
HGB BLD-MCNC: 15.7 G/DL (ref 13.3–17.7)
LDLC SERPL CALC-MCNC: 125 MG/DL
NONHDLC SERPL-MCNC: 147 MG/DL
POTASSIUM SERPL-SCNC: 3.7 MMOL/L (ref 3.4–5.3)
PROT SERPL-MCNC: 6.7 G/DL (ref 6.4–8.3)
PSA SERPL DL<=0.01 NG/ML-MCNC: 0.62 NG/ML (ref 0–4.5)
SODIUM SERPL-SCNC: 141 MMOL/L (ref 135–145)
TRIGL SERPL-MCNC: 109 MG/DL

## 2024-10-22 PROCEDURE — 99397 PER PM REEVAL EST PAT 65+ YR: CPT | Performed by: FAMILY MEDICINE

## 2024-10-22 PROCEDURE — G0103 PSA SCREENING: HCPCS | Performed by: FAMILY MEDICINE

## 2024-10-22 PROCEDURE — 99213 OFFICE O/P EST LOW 20 MIN: CPT | Mod: 25 | Performed by: FAMILY MEDICINE

## 2024-10-22 PROCEDURE — 85018 HEMOGLOBIN: CPT | Performed by: FAMILY MEDICINE

## 2024-10-22 PROCEDURE — 80053 COMPREHEN METABOLIC PANEL: CPT | Performed by: FAMILY MEDICINE

## 2024-10-22 PROCEDURE — 36415 COLL VENOUS BLD VENIPUNCTURE: CPT | Performed by: FAMILY MEDICINE

## 2024-10-22 PROCEDURE — 80061 LIPID PANEL: CPT | Performed by: FAMILY MEDICINE

## 2024-10-22 RX ORDER — LISINOPRIL 10 MG/1
10 TABLET ORAL DAILY
Qty: 90 TABLET | Refills: 3 | Status: SHIPPED | OUTPATIENT
Start: 2024-10-22

## 2024-10-22 RX ORDER — AMLODIPINE BESYLATE 5 MG/1
TABLET ORAL
Qty: 90 TABLET | Refills: 3 | Status: SHIPPED | OUTPATIENT
Start: 2024-10-22

## 2024-10-22 ASSESSMENT — PAIN SCALES - GENERAL: PAINLEVEL: NO PAIN (0)

## 2024-10-22 NOTE — PROGRESS NOTES
"Preventive Care Visit  [facility] KANU Silva MD, Family Medicine  Oct 22, 2024      Assessment & Plan     Encounter for annual physical exam  Labs we will follow-up on that.  - Hemoglobin; Future  - Hemoglobin    Essential hypertension  Blood pressure patient numbers were elevated however I checked it twice after that it came back to a reasonable range.  He sometimes feel little anxious however he is overall feeling better.  He is currently on amlodipine however blood pressure still on the upper side will add lisinopril 10 mg he will follow-up in 3 months for recheck.  Denies any chest pains no shortness of no other systemic symptoms.  - Comprehensive metabolic panel (BMP + Alb, Alk Phos, ALT, AST, Total. Bili, TP); Future  - Comprehensive metabolic panel (BMP + Alb, Alk Phos, ALT, AST, Total. Bili, TP)  - lisinopril (ZESTRIL) 10 MG tablet; Take 1 tablet (10 mg) by mouth daily.  - amLODIPine (NORVASC) 5 MG tablet; TAKE 1 TABLET(5 MG) BY MOUTH DAILY    Hyperlipidemia LDL goal <130    - Lipid panel reflex to direct LDL Non-fasting; Future  - Lipid panel reflex to direct LDL Non-fasting    Screening for prostate cancer    - PSA, screen; Future  - PSA, screen            BMI  Estimated body mass index is 29.74 kg/m  as calculated from the following:    Height as of this encounter: 1.845 m (6' 0.64\").    Weight as of this encounter: 101.2 kg (223 lb 3.2 oz).     Counseling  Appropriate preventive services were addressed with this patient via screening, questionnaire, or discussion as appropriate for fall prevention, nutrition, physical activity, Tobacco-use cessation, social engagement, weight loss and cognition.  Checklist reviewing preventive services available has been given to the patient.  Reviewed patient's diet, addressing concerns and/or questions.   He is at risk for psychosocial distress and has been provided with information to reduce risk.   The patient was provided with written " information regarding signs of hearing loss.   Information on urinary incontinence and treatment options given to patient.           Sofia Murphy is a 69 year old, presenting for the following:  Wellness Visit  For physical and his chronic medical conditions.      10/22/2024     9:56 AM   Additional Questions   Roomed by Khris ESPINOZA        Health Care Directive  Patient does not have a Health Care Directive or Living Will: Discussed advance care planning with patient; however, patient declined at this time.    HPI    Hyperlipidemia Follow-Up    Are you regularly taking any medication or supplement to lower your cholesterol?   No  Are you having muscle aches or other side effects that you think could be caused by your cholesterol lowering medication?  No    Hypertension Follow-up    Do you check your blood pressure regularly outside of the clinic? Yes   Are you following a low salt diet? Yes  Are your blood pressures ever more than 140 on the top number (systolic) OR more   than 90 on the josé      10/17/2024   General Health   How would you rate your overall physical health? Good   Feel stress (tense, anxious, or unable to sleep) Only a little      (!) STRESS CONCERN      10/17/2024   Nutrition   Diet: Breakfast skipped            10/17/2024   Exercise   Days per week of moderate/strenous exercise 4 days   Average minutes spent exercising at this level 30 min            10/17/2024   Social Factors   Frequency of gathering with friends or relatives Twice a week   Worry food won't last until get money to buy more No   Food not last or not have enough money for food? No   Do you have housing? (Housing is defined as stable permanent housing and does not include staying ouside in a car, in a tent, in an abandoned building, in an overnight shelter, or couch-surfing.) Yes   Are you worried about losing your housing? No   Lack of transportation? No   Unable to get utilities (heat,electricity)? No            10/17/2024    Fall Risk   Fallen 2 or more times in the past year? No    No   Trouble with walking or balance? No    No       Multiple values from one day are sorted in reverse-chronological order          10/17/2024   Activities of Daily Living- Home Safety   Needs help with the following daily activites None of the above   Safety concerns in the home None of the above            10/17/2024   Dental   Dentist two times every year? Yes            10/17/2024   Hearing Screening   Hearing concerns? (!) IT'S HARD TO FOLLOW A CONVERSATION IN A NOISY RESTAURANT OR CROWDED ROOM.            10/17/2024   Driving Risk Screening   Patient/family members have concerns about driving No            10/17/2024   General Alertness/Fatigue Screening   Have you been more tired than usual lately? No            10/17/2024   Urinary Incontinence Screening   Bothered by leaking urine in past 6 months Yes            10/17/2024   TB Screening   Were you born outside of the [place]? No            Today's PHQ-2 Score:       10/21/2024    10:45 AM   PHQ-2 ( 1999 [employer])   Q1: Little interest or pleasure in doing things 0   Q2: Feeling down, depressed or hopeless 0   PHQ-2 Score 0   Q1: Little interest or pleasure in doing things Not at all   Q2: Feeling down, depressed or hopeless Not at all   PHQ-2 Score 0           10/17/2024   Substance Use   Alcohol more than 3/day or more than 7/wk No   Do you have a current opioid prescription? No   How severe/bad is pain from 1 to 10? 0/10 (No Pain)   Do you use any other substances recreationally? No        Social History     Tobacco Use    Smoking status: Never     Passive exposure: Never    Smokeless tobacco: Never   Vaping Use    Vaping status: Never Used   Substance Use Topics    Alcohol use: Yes     Comment: less than weekly     Drug use: No           10/17/2024   AAA Screening   Family history of Abdominal Aortic Aneurysm (AAA)? No      Last PSA:   PSA   Date Value Ref Range Status   09/19/2019 1.21 0 - 4 ug/L  Final     Comment:     Assay Method:  Chemiluminescence using Siemens Vista analyzer     Prostate Specific Antigen Screen   Date Value Ref Range Status   10/17/2023 0.47 0.00 - 4.50 ng/mL Final   05/24/2022 0.69 0.00 - 4.00 ug/L Final     ASCVD Risk   The 10-year ASCVD risk score (Hunter BUTCHER, et al., 2019) is: 26.7%    Values used to calculate the score:      Age: 69 years      Sex: Male      Is Non- : No      Diabetic: No      Tobacco smoker: No      Systolic Blood Pressure: 177 mmHg      Is BP treated: Yes      HDL Cholesterol: 50 mg/dL      Total Cholesterol: 179 mg/dL      Reviewed and updated as needed this visit by Provider                  Current providers sharing in care for this patient include:  Patient Care Team:  Doyle Silva MD as PCP - General (Family Practice)  Doyle Silva MD as Assigned PCP  Juan Helm MD as Assigned Surgical Provider    The following health maintenance items are reviewed in Epic and correct as of today:  Health Maintenance   Topic Date Due    BMP  10/17/2024    LIPID  10/17/2024    ANNUAL REVIEW OF HM ORDERS  10/17/2024    MEDICARE ANNUAL WELLNESS VISIT  10/17/2024    FALL RISK ASSESSMENT  10/22/2025    GLUCOSE  10/17/2026    COLORECTAL CANCER SCREENING  06/02/2027    ADVANCE CARE PLANNING  10/17/2028    DTAP/TDAP/TD IMMUNIZATION (3 - Td or Tdap) 09/19/2029    HEPATITIS C SCREENING  Completed    PHQ-2 (once per calendar year)  Completed    INFLUENZA VACCINE  Completed    Pneumococcal Vaccine: 65+ Years  Completed    ZOSTER IMMUNIZATION  Completed    RSV VACCINE  Completed    COVID-19 Vaccine  Completed    HPV IMMUNIZATION  Aged Out    MENINGITIS IMMUNIZATION  Aged Out    RSV MONOCLONAL ANTIBODY  Aged Out         Review of Systems  CONSTITUTIONAL: NEGATIVE for fever, chills, change in weight  INTEGUMENTARY/SKIN: NEGATIVE for worrisome rashes, moles or lesions  EYES: NEGATIVE for vision changes or irritation  ENT/MOUTH: NEGATIVE for ear,  "mouth and throat problems  RESP: NEGATIVE for significant cough or SOB  BREAST: NEGATIVE for masses, tenderness or discharge  CV: NEGATIVE for chest pain, palpitations or peripheral edema  GI: NEGATIVE for nausea, abdominal pain, heartburn, or change in bowel habits  : NEGATIVE for frequency, dysuria, or hematuria  MUSCULOSKELETAL: NEGATIVE for significant arthralgias or myalgia  NEURO: NEGATIVE for weakness, dizziness or paresthesias  ENDOCRINE: NEGATIVE for temperature intolerance, skin/hair changes  HEME: NEGATIVE for bleeding problems  PSYCHIATRIC: NEGATIVE for changes in mood or affect     Objective    Exam  BP (!) 177/94   Pulse 72   Temp 97.9  F (36.6  C) (Temporal)   Resp 12   Ht 1.845 m (6' 0.64\")   Wt 101.2 kg (223 lb 3.2 oz)   SpO2 98%   BMI 29.74 kg/m     Estimated body mass index is 29.74 kg/m  as calculated from the following:    Height as of this encounter: 1.845 m (6' 0.64\").    Weight as of this encounter: 101.2 kg (223 lb 3.2 oz).    Physical Exam  GENERAL: alert and no distress  EYES: Eyes grossly normal to inspection, PERRL and conjunctivae and sclerae normal  HENT: ear canals and TM's normal, nose and mouth without ulcers or lesions  NECK: no adenopathy, no asymmetry, masses, or scars  RESP: lungs clear to auscultation - no rales, rhonchi or wheezes  CV: regular rate and rhythm, normal S1 S2, no S3 or S4, no murmur, click or rub, no peripheral edema  ABDOMEN: soft, nontender, no hepatosplenomegaly, no masses and bowel sounds normal  MS: no gross musculoskeletal defects noted, no edema  SKIN: no suspicious lesions or rashes  NEURO: Normal strength and tone, mentation intact and speech normal  PSYCH: mentation appears normal, affect normal/bright         10/22/2024   Mini Cog   Clock Draw Score 2 Normal   3 Item Recall 3 objects recalled   Mini Cog Total Score 5                Signed Electronically by: Doyle Silva MD    "

## 2025-01-22 ENCOUNTER — OFFICE VISIT (OUTPATIENT)
Dept: FAMILY MEDICINE | Facility: CLINIC | Age: 70
End: 2025-01-22
Payer: COMMERCIAL

## 2025-01-22 VITALS
SYSTOLIC BLOOD PRESSURE: 136 MMHG | WEIGHT: 224 LBS | RESPIRATION RATE: 18 BRPM | TEMPERATURE: 97.1 F | HEART RATE: 67 BPM | BODY MASS INDEX: 29.69 KG/M2 | HEIGHT: 73 IN | DIASTOLIC BLOOD PRESSURE: 70 MMHG

## 2025-01-22 DIAGNOSIS — E78.5 HYPERLIPIDEMIA LDL GOAL <130: ICD-10-CM

## 2025-01-22 DIAGNOSIS — I10 ESSENTIAL HYPERTENSION: Primary | ICD-10-CM

## 2025-01-22 PROCEDURE — 36415 COLL VENOUS BLD VENIPUNCTURE: CPT | Performed by: FAMILY MEDICINE

## 2025-01-22 PROCEDURE — 80048 BASIC METABOLIC PNL TOTAL CA: CPT | Performed by: FAMILY MEDICINE

## 2025-01-22 PROCEDURE — 99214 OFFICE O/P EST MOD 30 MIN: CPT | Performed by: FAMILY MEDICINE

## 2025-01-22 PROCEDURE — G2211 COMPLEX E/M VISIT ADD ON: HCPCS | Performed by: FAMILY MEDICINE

## 2025-01-22 ASSESSMENT — PAIN SCALES - GENERAL: PAINLEVEL_OUTOF10: NO PAIN (0)

## 2025-01-22 NOTE — PROGRESS NOTES
"  Assessment & Plan     Essential hypertension  Blood pressure is stable.  Advised patient to continue same dose of lisinopril at amlodipine.  Follow-up in 6-9 months for physical.  - BASIC METABOLIC PANEL; Future  - BASIC METABOLIC PANEL    Hyperlipidemia LDL goal <130    The longitudinal plan of care for the diagnosis(es)/condition(s) as documented were addressed during this visit. Due to the added complexity in care, I will continue to support Jeffrey in the subsequent management and with ongoing continuity of care.          BMI  Estimated body mass index is 29.85 kg/m  as calculated from the following:    Height as of this encounter: 1.845 m (6' 0.64\").    Weight as of this encounter: 101.6 kg (224 lb).             Subjective   Jeffrey is a 69 year old, presenting for the following health issues:  Hypertension and Recheck Medication  Blood pressure is stable addition of lisinopril with amlodipine denies any chest pains no shortness of breath.      1/22/2025    11:13 AM   Additional Questions   Roomed by Ave KNOWLES     History of Present Illness       Hypertension: He presents for follow up of hypertension.  He does not check blood pressure  regularly outside of the clinic. Outside blood pressures have been over 140/90. He does not follow a low salt diet.     He eats 2-3 servings of fruits and vegetables daily.He consumes 1 sweetened beverage(s) daily.He exercises with enough effort to increase his heart rate 30 to 60 minutes per day.  He exercises with enough effort to increase his heart rate 6 days per week.   He is taking medications regularly.         Medication Followup of lisinopril   Taking Medication as prescribed: yes  Side Effects:   Gi disturbance   Medication Helping Symptoms:  not applicable        Review of Systems  Constitutional, HEENT, cardiovascular, pulmonary, gi and gu systems are negative, except as otherwise noted.      Objective    /70 (BP Location: Left arm, Patient Position: Sitting, Cuff " "Size: Adult Large)   Pulse 67   Temp 97.1  F (36.2  C) (Tympanic)   Resp 18   Ht 1.845 m (6' 0.64\")   Wt 101.6 kg (224 lb)   BMI 29.85 kg/m    Body mass index is 29.85 kg/m .  Physical Exam   GENERAL: alert and no distress  NECK: no adenopathy, no asymmetry, masses, or scars  RESP: lungs clear to auscultation - no rales, rhonchi or wheezes  CV: regular rate and rhythm, normal S1 S2, no S3 or S4, no murmur, click or rub, no peripheral edema  ABDOMEN: soft, nontender, no hepatosplenomegaly, no masses and bowel sounds normal  MS: no gross musculoskeletal defects noted, no edema            Signed Electronically by: Doyle Silva MD    "

## 2025-01-23 LAB
ANION GAP SERPL CALCULATED.3IONS-SCNC: 8 MMOL/L (ref 7–15)
BUN SERPL-MCNC: 15.4 MG/DL (ref 8–23)
CALCIUM SERPL-MCNC: 9.2 MG/DL (ref 8.8–10.4)
CHLORIDE SERPL-SCNC: 106 MMOL/L (ref 98–107)
CREAT SERPL-MCNC: 1.06 MG/DL (ref 0.67–1.17)
EGFRCR SERPLBLD CKD-EPI 2021: 77 ML/MIN/1.73M2
GLUCOSE SERPL-MCNC: 80 MG/DL (ref 70–99)
HCO3 SERPL-SCNC: 27 MMOL/L (ref 22–29)
POTASSIUM SERPL-SCNC: 4.4 MMOL/L (ref 3.4–5.3)
SODIUM SERPL-SCNC: 141 MMOL/L (ref 135–145)

## 2025-02-10 ENCOUNTER — TELEPHONE (OUTPATIENT)
Dept: FAMILY MEDICINE | Facility: CLINIC | Age: 70
End: 2025-02-10
Payer: COMMERCIAL

## 2025-02-10 NOTE — TELEPHONE ENCOUNTER
Pt called the clinic informing that he cannot swallow - not affecting his breathing but affecting his eating and drinking. Pt mentioned that he has an ENT specialist that he sees. Triage recommended pt to call their clinic to be evaluated and follow-up with his provider there. Pt verbalized understanding and has no questions or concerns.    Alonzo Galindo RN  [facility]

## 2025-02-20 ENCOUNTER — TRANSFERRED RECORDS (OUTPATIENT)
Dept: HEALTH INFORMATION MANAGEMENT | Facility: CLINIC | Age: 70
End: 2025-02-20
Payer: COMMERCIAL

## 2025-03-10 ENCOUNTER — TRANSFERRED RECORDS (OUTPATIENT)
Dept: HEALTH INFORMATION MANAGEMENT | Facility: CLINIC | Age: 70
End: 2025-03-10
Payer: COMMERCIAL

## 2025-04-23 NOTE — PROGRESS NOTES
SPEECH LANGUAGE PATHOLOGY EVALUATION       Fall Risk Screen:  Have you fallen 2 or more times in the past year?: No  Have you fallen and had an injury in the past year?: No  Is patient receiving Physical Therapy Services?: No    Subjective        Presenting condition or subjective complaint: Swallowing issues. Throat injury; pt presents today for video swallow study and esophagram   Date of onset:      Relevant medical history: High blood pressure; Osteoarthritis; Sleep disorder like apnea   Patient Active Problem List   Diagnosis    Sleep apnea    Diverticulitis of colon    Hyperlipidemia LDL goal <130    Neck arthritis    Rosacea    Right Achilles tendinitis    Acute kidney injury    Right ureteral stone    Right renal stone    Essential hypertension    Acute bilateral low back pain without sciatica    History of total left hip replacement    Rupture Achilles tendon    Nephrolithiasis     Dates & types of surgery: 2002 Uvulopalatopharyngoplasty  Past Surgical History:   Procedure Laterality Date    ABDOMEN SURGERY  2006    APPENDECTOMY      2006 along with diverticulities     COLONOSCOPY      COLONOSCOPY N/A 6/2/2022    Procedure: COLONOSCOPY, FLEXIBLE, WITH LESION REMOVAL USING SNARE;  Surgeon: Allyson Arciniega MD;  Location:  GI    COMBINED CYSTOSCOPY, INSERT STENT URETER(S) Right 10/15/2020    Procedure: CYSTOSCOPY, WITH RIGHT URETERAL STENT PLACEMENT, RIGHT RETROGRADE PYLEOGRAM, RIGHT URETERAL STONE DISPACEMENT WITHOUT REMOVAL;  Surgeon: Lucio Butt MD;  Location:  OR    EXTRACORPOREAL SHOCK WAVE LITHOTRIPSY (ESWL) Right 11/10/2020    Procedure: LITHOTRIPSY, EXTRACORPOREAL SHOCK WAVE (ESWL);  Surgeon: Lucio Butt MD;  Location:  OR    SEPTOPLASTY      SOFT TISSUE SURGERY  2004    Left achilles tendon    [facility] TOTAL HIP ARTHROPLASTY      2005     Prior diagnostic imaging/testing results: no      Prior therapy history for the same diagnosis, illness or injury:  no      Living  "Environment  Social support: With family members   Help at home: None    Employment: Yes    Hobbies/Interests: Hiking/walking, photography    Patient goals for therapy:  figure out what is causing the globus sensation    Pain assessment:  pt denies pain but reports globus sensation in his throat     Objective     SWALLOW EVALUTION  Dysphagia history: Today, pt reports two issues. 1) globus sensation that began in 2022 after it felt like a fishbone may have gotten stuck in his throat. The globus comes and goes with no clear inciting or alleviating factors. Pt reports he tends to notice it more around mealtimes. It feels like he should be able to \"reach in my throat and grab something that is there\".  The second issue is difficulty with a \"blockage\" in his swallow. He will swallowing something and then it will feel like he cannot  bring it up or swallowing anything down. His voice changes, but he can breathe. This happens every few months or so.     Current Diet/Method of Nutritional Intake: thin liquids (level 0), regular diet      CLINICAL SWALLOW EVALUATION  Oral Motor Function:   Dentition: adequate dentition  Secretion management: WFL  Mucosal quality: adequate  Mandibular function: intact  Oral labial function: WFL  Lingual function: WFL  Velar function: hx UPPP   Buccal function: intact  Laryngeal function: cough, throat clear, volitional swallow, voicing WFL     Level of assist required for feeding: no assistance needed   Textures Trialed:   Clinical Swallow Eval: Thin Liquids  Mode of presentation: cup, self-fed   Volume presented: 3oz  Preparatory Phase: WFL  Oral Phase: WFL  Pharyngeal phase of swallow: intact   Strategies trialed during procedure: n/a   Diagnostic statement: No clinical s/sx of penetration/aspiration.      ADDITIONAL EVAL COMPLETED TODAY : yes; rationale: to further assess pharyngeal phase    VIDEOFLUOROSCOPIC SWALLOW STUDY  Radiologist: Resident  Views Taken: left lateral, A/P   [facility]  Patient sitting upright on chair/stool     VFSS textures trialed:   VFSS Eval: Thin Liquids  Mode of Presentation: cup, straw, self-fed   Order of Presentation: Series 2, 3, 7, 12AP  Preparatory Phase: WFL  Oral Phase: WFL  Bolus Location When Swallow Initiated: posterior angle of ramus  Pharyngeal Phase: WFL  Rosenbeck's Penetration Aspiration Scale: 1 - no aspiration, contrast does not enter airway  Strategies and Compensations: not applicable  Diagnostic Statement: No penetration/aspiration or significant residuals. AP reveals symmetric bolus flow through oropharynx.    VFSS Eval: Mildly Thick Liquids  Mode of Presentation: cup, self-fed   Order of Presentation: Series 4  Preparatory Phase: WFL  Oral Phase: WFL  Bolus Location When Swallow Initiated: posterior angle of ramus  Pharyngeal Phase: WFL  Rosenbeck's Penetration Aspiration Scale: 1 - no aspiration, contrast does not enter airway  Strategies and Compensations: not applicable  Diagnostic Statement: No penetration/aspiration or significant residuals.    VFSS Eval: Purees  Mode of Presentation: spoon, self-fed   Order of Presentation: Series 5, 6, 11AP  Preparatory Phase: WFL  Oral Phase: WFL  Bolus Location When Swallow Initiated: posterior angle of ramus  Pharyngeal Phase:  trace residue in PE segment  Rosenbeck s Penetration Aspiration Scale: 1 - no aspiration, contrast does not enter airway  Strategies and Compensations: not applicable  Diagnostic Statement: No penetration/aspiration or significant residuals. AP reveals symmetric bolus flow through oropharynx.    VFSS Eval: Solids  Mode of Presentation: self-fed   Order of Presentation: Series 8, 9  Preparatory Phase: WFL  Oral Phase: WFL  Bolus Location When Swallow Initiated: posterior angle of ramus  Pharyngeal Phase: residue in pyriform sinus   Rosenbeck s Penetration Aspiration Scale: 1 - no aspiration, contrast does not enter airway  Strategies and  Compensations: not applicable  Diagnostic Statement: No penetration/aspiration. Trace piriform sinus/PE segment residue.    VFSS Eval: Barium Tablet  Mode of Presentation: whole tablet taken with thin water by cup   Order of Presentation: during esophagram  Preparatory Phase: WFL  Oral Phase: WFL  Bolus Location When Swallow Initiated: posterior angle of ramus  Pharyngeal Phase: WFL  Rosenbeck s Penetration Aspiration Scale: 1 - no aspiration, contrast does not enter airway  Strategies and Compensations: not applicable  Diagnostic Statement: Barium tablet passed through oropharynx without difficulty.    ESOPHAGEAL PHASE OF SWALLOW  patient reports symptoms of esophageal dysphagia  Esophagram performed today; please see Radiologist's report for details      SWALLOW ASSESSMENT CLINICAL IMPRESSIONS AND RATIONALE  Diet Consistency Recommendations: thin liquids (level 0), regular diet    Recommended Feeding/Eating Techniques: General safe swallow strategies: small sips/bites, slow pace, minimize distractions during PO intake   Medication Administration Recommendations: one at a time with thin liquid  Instrumental Assessment Recommendations: instrumental evaluation not recommended at this time     Assessment & Plan   CLINICAL IMPRESSIONS   Medical Diagnosis: Swallowing difficulty (R13.10)    Treatment Diagnosis: Safe, functional oropharyngeal swallow   Impression/Assessment: Pt is a 69 year old male with swallow complaints. The following significant findings have been identified:  safe, functional oropharyngeal swallow , characterized by No penetration/aspiration or significant residuals with any PO trial texture. Esophagram performed today, as well; please see Radiologist's report for details. Pending results of esophagram could consider GI referral. No further SLP services for swallowing are warranted at this time.     PLAN OF CARE  Evaluation only     Recommended Referrals to Other Professionals:  GI? Esophageal      Education Assessment:   Learner/Method: Patient;Listening;Pictures/Video;No Barriers to Learning  Education Comments: Trained pt on anatomy/physiology of swallowing, results of today's study and diet recommendations    Risks and benefits of evaluation/treatment have been explained.   Patient/Family/caregiver agrees with Plan of Care.     Evaluation Time:    SLP Eval: oral/pharyngeal swallow function, clinical minutes (60226): 15  SLP Eval: VideoFluoroscopic Swallow function Minutes (22645): 20  Evaluation Only     Signing Clinician: Deepali Park SLP

## 2025-04-24 ENCOUNTER — THERAPY VISIT (OUTPATIENT)
Dept: SPEECH THERAPY | Facility: CLINIC | Age: 70
End: 2025-04-24
Attending: OTOLARYNGOLOGY
Payer: COMMERCIAL

## 2025-04-24 DIAGNOSIS — R13.12 OROPHARYNGEAL DYSPHAGIA: ICD-10-CM

## 2025-04-24 DIAGNOSIS — R13.10 SWALLOWING DIFFICULTY: Primary | ICD-10-CM

## 2025-05-01 ENCOUNTER — IMMUNIZATION (OUTPATIENT)
Dept: INTERNAL MEDICINE | Facility: CLINIC | Age: 70
End: 2025-05-01
Payer: COMMERCIAL

## 2025-05-12 ENCOUNTER — TRANSFERRED RECORDS (OUTPATIENT)
Dept: HEALTH INFORMATION MANAGEMENT | Facility: CLINIC | Age: 70
End: 2025-05-12
Payer: COMMERCIAL

## 2025-05-22 ENCOUNTER — TRANSFERRED RECORDS (OUTPATIENT)
Dept: HEALTH INFORMATION MANAGEMENT | Facility: CLINIC | Age: 70
End: 2025-05-22
Payer: COMMERCIAL

## 2025-05-29 ENCOUNTER — TRANSFERRED RECORDS (OUTPATIENT)
Dept: HEALTH INFORMATION MANAGEMENT | Facility: CLINIC | Age: 70
End: 2025-05-29
Payer: COMMERCIAL

## 2025-06-27 ENCOUNTER — TRANSFERRED RECORDS (OUTPATIENT)
Dept: HEALTH INFORMATION MANAGEMENT | Facility: CLINIC | Age: 70
End: 2025-06-27
Payer: COMMERCIAL

## 2025-07-07 DIAGNOSIS — I10 ESSENTIAL HYPERTENSION: ICD-10-CM

## 2025-07-08 RX ORDER — LISINOPRIL 10 MG/1
10 TABLET ORAL DAILY
Qty: 90 TABLET | Refills: 3 | OUTPATIENT
Start: 2025-07-08

## (undated) DEVICE — GLOVE PROTEXIS W/NEU-THERA 8.0  2D73TE80

## (undated) DEVICE — CATH URETERAL OPEN END 6FR AXXCESS

## (undated) DEVICE — PACK CYSTOSCOPY SBA15CYFSI

## (undated) DEVICE — SOL WATER IRRIG 3000ML BAG 2B7117

## (undated) DEVICE — SOL WATER IRRIG 1000ML BOTTLE 2F7114

## (undated) DEVICE — RAD RX ISOVUE 300 (50ML) 61% IOPAMIDOL CHARGE PER ML

## (undated) DEVICE — LINEN TOWEL PACK X5 5464

## (undated) DEVICE — PAD CHUX UNDERPAD 23X24" 7136

## (undated) DEVICE — BAG CYSTO TABLE DRAIN

## (undated) RX ORDER — FENTANYL CITRATE 50 UG/ML
INJECTION, SOLUTION INTRAMUSCULAR; INTRAVENOUS
Status: DISPENSED
Start: 2022-06-02

## (undated) RX ORDER — FENTANYL CITRATE 50 UG/ML
INJECTION, SOLUTION INTRAMUSCULAR; INTRAVENOUS
Status: DISPENSED
Start: 2020-11-10

## (undated) RX ORDER — ONDANSETRON 2 MG/ML
INJECTION INTRAMUSCULAR; INTRAVENOUS
Status: DISPENSED
Start: 2020-11-10

## (undated) RX ORDER — FENTANYL CITRATE 50 UG/ML
INJECTION, SOLUTION INTRAMUSCULAR; INTRAVENOUS
Status: DISPENSED
Start: 2020-10-15

## (undated) RX ORDER — PROPOFOL 10 MG/ML
INJECTION, EMULSION INTRAVENOUS
Status: DISPENSED
Start: 2020-11-10

## (undated) RX ORDER — CEFAZOLIN SODIUM 2 G/100ML
INJECTION, SOLUTION INTRAVENOUS
Status: DISPENSED
Start: 2020-10-15

## (undated) RX ORDER — CEFAZOLIN SODIUM 2 G/100ML
INJECTION, SOLUTION INTRAVENOUS
Status: DISPENSED
Start: 2020-11-10

## (undated) RX ORDER — LIDOCAINE HYDROCHLORIDE 20 MG/ML
INJECTION, SOLUTION EPIDURAL; INFILTRATION; INTRACAUDAL; PERINEURAL
Status: DISPENSED
Start: 2020-11-10

## (undated) RX ORDER — DEXAMETHASONE SODIUM PHOSPHATE 4 MG/ML
INJECTION, SOLUTION INTRA-ARTICULAR; INTRALESIONAL; INTRAMUSCULAR; INTRAVENOUS; SOFT TISSUE
Status: DISPENSED
Start: 2020-11-10

## (undated) RX ORDER — PROPOFOL 10 MG/ML
INJECTION, EMULSION INTRAVENOUS
Status: DISPENSED
Start: 2020-10-15